# Patient Record
Sex: FEMALE | Race: WHITE | NOT HISPANIC OR LATINO | Employment: FULL TIME | URBAN - METROPOLITAN AREA
[De-identification: names, ages, dates, MRNs, and addresses within clinical notes are randomized per-mention and may not be internally consistent; named-entity substitution may affect disease eponyms.]

---

## 2019-05-14 ENCOUNTER — OCCUPATIONAL HEALTH (OUTPATIENT)
Dept: URGENT CARE | Facility: CLINIC | Age: 59
End: 2019-05-14

## 2019-05-14 DIAGNOSIS — Z02.1 ENCOUNTER FOR PRE-EMPLOYMENT HEALTH SCREENING EXAMINATION: Primary | ICD-10-CM

## 2019-05-14 PROCEDURE — 86580 TB INTRADERMAL TEST: CPT | Mod: S$GLB,,, | Performed by: PHYSICIAN ASSISTANT

## 2019-05-14 PROCEDURE — 86706 HEPATITIS B SURFACE ANTIBODY, QUANTITATIVE: ICD-10-PCS | Mod: S$GLB,,, | Performed by: PHYSICIAN ASSISTANT

## 2019-05-14 PROCEDURE — 80305 DRUG TEST PRSMV DIR OPT OBS: CPT | Mod: S$GLB,,, | Performed by: PHYSICIAN ASSISTANT

## 2019-05-14 PROCEDURE — 86580 POCT TB SKIN TEST: ICD-10-PCS | Mod: S$GLB,,, | Performed by: PHYSICIAN ASSISTANT

## 2019-05-14 PROCEDURE — 80305 OOH NON-DOT DRUG SCREEN: ICD-10-PCS | Mod: S$GLB,,, | Performed by: PHYSICIAN ASSISTANT

## 2019-05-14 PROCEDURE — 86706 HEP B SURFACE ANTIBODY: CPT | Mod: S$GLB,,, | Performed by: PHYSICIAN ASSISTANT

## 2019-05-16 LAB
TB INDURATION - 48 HR READ: 0 MM
TB INDURATION - 72 HR READ: NORMAL MM
TB SKIN TEST - 48 HR READ: NEGATIVE
TB SKIN TEST - 72 HR READ: NORMAL

## 2019-06-04 ENCOUNTER — OCCUPATIONAL HEALTH (OUTPATIENT)
Dept: URGENT CARE | Facility: CLINIC | Age: 59
End: 2019-06-04

## 2019-06-04 DIAGNOSIS — Z02.1 ENCOUNTER FOR PRE-EMPLOYMENT EXAMINATION: Primary | ICD-10-CM

## 2019-06-04 PROCEDURE — 90739 HEPATITIS B (RECOMBINANT) ADJUVANTED, 2 DOSE: ICD-10-PCS | Mod: S$GLB,,, | Performed by: PHYSICIAN ASSISTANT

## 2019-06-04 PROCEDURE — 90739 HEPB VACC 2/4 DOSE ADULT IM: CPT | Mod: S$GLB,,, | Performed by: PHYSICIAN ASSISTANT

## 2019-07-05 ENCOUNTER — OCCUPATIONAL HEALTH (OUTPATIENT)
Dept: URGENT CARE | Facility: CLINIC | Age: 59
End: 2019-07-05

## 2019-07-05 DIAGNOSIS — Z23 NEED FOR PROPHYLACTIC VACCINATION AGAINST HEPATITIS B VIRUS: Primary | ICD-10-CM

## 2019-07-05 PROCEDURE — 90739 HEPB VACC 2/4 DOSE ADULT IM: CPT | Mod: S$GLB,,, | Performed by: NURSE PRACTITIONER

## 2019-07-05 PROCEDURE — 90739 HEPATITIS B (RECOMBINANT) ADJUVANTED, 2 DOSE: ICD-10-PCS | Mod: S$GLB,,, | Performed by: NURSE PRACTITIONER

## 2019-08-06 ENCOUNTER — OCCUPATIONAL HEALTH (OUTPATIENT)
Dept: URGENT CARE | Facility: CLINIC | Age: 59
End: 2019-08-06

## 2019-08-06 DIAGNOSIS — Z01.84 IMMUNITY STATUS TESTING: Primary | ICD-10-CM

## 2019-08-06 PROCEDURE — 86706 HEP B SURFACE ANTIBODY: CPT | Mod: S$GLB,,, | Performed by: PHYSICIAN ASSISTANT

## 2019-08-06 PROCEDURE — 86706 HEPATITIS B SURFACE ANTIBODY, QUANTITATIVE: ICD-10-PCS | Mod: S$GLB,,, | Performed by: PHYSICIAN ASSISTANT

## 2019-08-07 LAB — HBV SURFACE AB SER-ACNC: 714.6 MIU/ML

## 2020-04-24 ENCOUNTER — NURSE TRIAGE (OUTPATIENT)
Dept: ADMINISTRATIVE | Facility: CLINIC | Age: 60
End: 2020-04-24

## 2020-04-24 ENCOUNTER — OFFICE VISIT (OUTPATIENT)
Dept: URGENT CARE | Facility: CLINIC | Age: 60
End: 2020-04-24
Payer: COMMERCIAL

## 2020-04-24 VITALS
BODY MASS INDEX: 24.19 KG/M2 | TEMPERATURE: 98 F | HEIGHT: 59 IN | OXYGEN SATURATION: 97 % | WEIGHT: 120 LBS | HEART RATE: 78 BPM

## 2020-04-24 DIAGNOSIS — R10.9 ABDOMINAL CRAMPS: ICD-10-CM

## 2020-04-24 DIAGNOSIS — Z20.822 SUSPECTED COVID-19 VIRUS INFECTION: Primary | ICD-10-CM

## 2020-04-24 DIAGNOSIS — Z76.89 ENCOUNTER TO ESTABLISH CARE: ICD-10-CM

## 2020-04-24 DIAGNOSIS — R11.0 NAUSEA: ICD-10-CM

## 2020-04-24 PROCEDURE — 99204 PR OFFICE/OUTPT VISIT, NEW, LEVL IV, 45-59 MIN: ICD-10-PCS | Mod: S$GLB,,, | Performed by: NURSE PRACTITIONER

## 2020-04-24 PROCEDURE — 99204 OFFICE O/P NEW MOD 45 MIN: CPT | Mod: S$GLB,,, | Performed by: NURSE PRACTITIONER

## 2020-04-24 PROCEDURE — U0002 COVID-19 LAB TEST NON-CDC: HCPCS

## 2020-04-24 RX ORDER — ONDANSETRON 4 MG/1
4 TABLET, ORALLY DISINTEGRATING ORAL EVERY 6 HOURS PRN
Qty: 12 TABLET | Refills: 0 | Status: SHIPPED | OUTPATIENT
Start: 2020-04-24 | End: 2022-09-21

## 2020-04-24 RX ORDER — DICYCLOMINE HYDROCHLORIDE 20 MG/1
20 TABLET ORAL EVERY 6 HOURS
Qty: 120 TABLET | Refills: 0 | Status: SHIPPED | OUTPATIENT
Start: 2020-04-24 | End: 2020-05-24

## 2020-04-24 NOTE — PROGRESS NOTES
"Subjective:       Patient ID: Jayshree Bee is a 59 y.o. female.    Vitals:  height is 4' 11" (1.499 m) and weight is 54.4 kg (120 lb). Her temperature is 97.9 °F (36.6 °C). Her pulse is 78. Her oxygen saturation is 97%.     Chief Complaint: URI    Patient c/o diarrhea and cough for 10 days. 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 [phone]  Started as cough, uri. Still having ha, cough, gas, abdominal cramping. OTC allergy medications. Has been rehydrating with electrolyte fluids.     URI    This is a new problem. The current episode started 1 to 4 weeks ago (10 days ). The problem has been unchanged. There has been no fever. Associated symptoms include coughing, diarrhea and nausea. Pertinent negatives include no congestion, ear pain, rash, sinus pain, sore throat, vomiting or wheezing. She has tried nothing for the symptoms.       Constitution: Negative for chills, sweating, fatigue and fever.   HENT: Negative for ear pain, congestion, sinus pain, sinus pressure, sore throat and voice change.    Neck: Negative for painful lymph nodes.   Eyes: Negative for eye redness.   Respiratory: Positive for cough. Negative for chest tightness, sputum production, bloody sputum, COPD, shortness of breath, stridor, wheezing and asthma.    Gastrointestinal: Positive for nausea and diarrhea. Negative for vomiting.   Musculoskeletal: Negative for muscle ache.   Skin: Negative for rash.   Allergic/Immunologic: Negative for seasonal allergies and asthma.   Hematologic/Lymphatic: Negative for swollen lymph nodes.       Objective:      Physical Exam   due to the state of emergency this visit has been done remotely as per [facility] protocol.       Assessment:       1. Suspected Covid-19 Virus Infection    2. Nausea    3. Abdominal cramps    4. Encounter to establish care        Plan:       I had a detailed discussion with the patient on quarantining themselves in till they receive results. "  Also discussed symptomatic management.  Also discussed calling anywhere care for further assistance if they start become worse.  Patient voiced understanding and is in agreement with the plan of care.  Suspected Covid-19 Virus Infection  -     COVID-19 Routine Screening    Nausea  -     ondansetron (ZOFRAN-ODT) 4 MG TbDL; Take 1 tablet (4 mg total) by mouth every 6 (six) hours as needed.  Dispense: 12 tablet; Refill: 0    Abdominal cramps  -     dicyclomine (BENTYL) 20 mg tablet; Take 1 tablet (20 mg total) by mouth every 6 (six) hours.  Dispense: 120 tablet; Refill: 0    Encounter to establish care  -     Ambulatory referral/consult to Internal Medicine         Patient Instructions   Instructions for Patients with Confirmed or Suspected COVID-19    If you are awaiting your test result, you will either be called or it will be released to the patient portal.  If you have any questions about your test, please visit [email or web address] or call our COVID-19 information line at [phone].       Stay home and stay away from family members and friends. The CDC says, you can leave home after these three things have happened: 1) You have had no fever for at least 72 hours (that is three full days of no fever without the use of medicine that reduces fevers) 2) AND other symptoms have improved (for example, when your cough or shortness of breath have improved) 3) AND at least 7 days have passed since your symptoms first appeared.   Separate yourself from other people and animals in your home.   Call ahead before visiting your doctor.   Wear a facemask.   Cover your coughs and sneezes.   Wash your hands often with soap and water; hand  can be used, too.   Avoid sharing personal household items.   Wipe down surfaces used daily.   Monitor your symptoms. Seek prompt medical attention if your illness is worsening (e.g., difficulty breathing).    Before seeking care, call your healthcare  provider.   If you have a medical emergency and need to call 911, notify the dispatch personnel that you have, or are being evaluated for COVID-19. If possible, put on a facemask before emergency medical services arrive.        Recommended precautions for household members, intimate partners, and caregivers in a home setting of a patient with symptomatic laboratory-confirmed COVID-19 or a patient under investigation.  Household members, intimate partners, and caregivers in the home setting awaiting tests results have close contact with a person with symptomatic, laboratory-confirmed COVID-19 or a person under investigation. Close contacts should monitor their health; they should call their provider right away if they develop symptoms suggestive of COVID-19 (e.g., fever, cough, shortness of breath).    Close contacts should also follow these recommendations:   Make sure that you understand and can help the patient follow their provider's instructions for medication(s) and care. You should help the patient with basic needs in the home and provide support for getting groceries, prescriptions, and other personal needs.   Monitor the patient's symptoms. If the patient is getting sicker, call his or her healthcare provider and tell them that the patient has laboratory-confirmed COVID-19. If the patient has a medical emergency and you need to call 911, notify the dispatch personnel that the patient has, or is being evaluated for COVID-19.   Household members should stay in another room or be  from the patient. Household members should use a separate bedroom and bathroom, if available.   Prohibit visitors.   Household members should care for any pets in the home.   Make sure that shared spaces in the home have good air flow, such as by an air conditioner or an opened window, weather permitting.   Perform hand hygiene frequently. Wash your hands often with soap and water for at least 20 seconds or use an  alcohol-based hand  (that contains > 60% alcohol) covering all surfaces of your hands and rubbing them together until they feel dry. Soap and water should be used preferentially.   Avoid touching your eyes, nose, and mouth.   The patient should wear a facemask. If the patient is not able to wear a facemask (for example, because it causes trouble breathing), caregivers should wear a mask when they are in the same room as the patient.   Wear a disposable facemask and gloves when you touch or have contact with the patient's blood, stool, or body fluids, such as saliva, sputum, nasal mucus, vomit, urine.  o Throw out disposable facemasks and gloves after using them. Do not reuse.  o When removing personal protective equipment, first remove and dispose of gloves. Then, immediately clean your hands with soap and water or alcohol-based hand . Next, remove and dispose of facemask, and immediately clean your hands again with soap and water or alcohol-based hand .   You should not share dishes, drinking glasses, cups, eating utensils, towels, bedding, or other items with the patient. After the patient uses these items, you should wash them thoroughly (see below Wash laundry thoroughly).   Clean all high-touch surfaces, such as counters, tabletops, doorknobs, bathroom fixtures, toilets, phones, keyboards, tablets, and bedside tables, every day. Also, clean any surfaces that may have blood, stool, or body fluids on them.   Use a household cleaning spray or wipe, according to the label instructions. Labels contain instructions for safe and effective use of the cleaning product including precautions you should take when applying the product, such as wearing gloves and making sure you have good ventilation during use of the product.   Wash laundry thoroughly.  o Immediately remove and wash clothes or bedding that have blood, stool, or body fluids on them.  o Wear disposable gloves while  handling soiled items and keep soiled items away from your body. Clean your hands (with soap and water or an alcohol-based hand ) immediately after removing your gloves.  o Read and follow directions on labels of laundry or clothing items and detergent. In general, using a normal laundry detergent according to washing machine instructions and dry thoroughly using the warmest temperatures recommended on the clothing label.   Place all used disposable gloves, facemasks, and other contaminated items in a lined container before disposing of them with other household waste. Clean your hands (with soap and water or an alcohol-based hand ) immediately after handling these items. Soap and water should be used preferentially if hands are visibly dirty.   Discuss any additional questions with your state or local health department or healthcare provider. Check available hours when contacting your local health department.    For more information see CDC link below.      https://www.cdc.gov/coronavirus/2019-ncov/hcp/guidance-prevent-spread.html#precautions        Sources:  [facility]– [place], [place] [facility]    Treating Diarrhea    Diarrhea happens when you have loose, watery, or frequent bowel movements. It is a common problem with many causes. Most cases of diarrhea clear up on their own. But certain cases may need treatment. Be sure to see your healthcare provider if your symptoms do not improve within a few days.  Getting relief  Treatment of diarrhea depends on its cause. Diarrhea caused by bacterial or parasite infection is often treated with antibiotics. Diarrhea caused by other factors, such as a stomach virus, often improves with simple home treatment. The tips below may also help relieve your symptoms.  · Drink plenty of fluids. This helps prevent too much fluid loss (dehydration). Water, clear soups, and electrolyte solutions are good choices. Avoid alcohol, coffee, tea, and milk.  These can irritate your intestines and make symptoms worse.  · Suck on ice chips if drinking makes you queasy.  · Return to your normal diet slowly. You may want to eat bland foods at first, such as rice and toast. Also, you may need to avoid certain foods for a while, such as dairy products. These can make symptoms worse. Ask your healthcare provider if there are any other foods you should avoid.  · If you were prescribed antibiotics, take them as directed.  · Do not take anti-diarrhea medicines without asking your healthcare provider first.  Call your healthcare provider   Call your healthcare provider if you have any of the following:   · A fever of 100.4°F (38.0°C) or higher, or as directed by your healthcare provider  · Severe pain  · Worsening diarrhea or diarrhea for more than 2 days  · Bloody vomit or stool  · Signs of dehydration (dizziness, dry mouth and tongue, rapid pulse, dark urine)  Date Last Reviewed: 7/1/2016  © 8166-3861 [employer]. [address] [place] [address], [place] [address]. All rights reserved. This information is not intended as a substitute for professional medical care. Always follow your healthcare professional's instructions.

## 2020-04-24 NOTE — PATIENT INSTRUCTIONS
Instructions for Patients with Confirmed or Suspected COVID-19    If you are awaiting your test result, you will either be called or it will be released to the patient portal.  If you have any questions about your test, please visit [email or web address] or call our COVID-19 information line at [phone].       Stay home and stay away from family members and friends. The CDC says, you can leave home after these three things have happened: 1) You have had no fever for at least 72 hours (that is three full days of no fever without the use of medicine that reduces fevers) 2) AND other symptoms have improved (for example, when your cough or shortness of breath have improved) 3) AND at least 7 days have passed since your symptoms first appeared.   Separate yourself from other people and animals in your home.   Call ahead before visiting your doctor.   Wear a facemask.   Cover your coughs and sneezes.   Wash your hands often with soap and water; hand  can be used, too.   Avoid sharing personal household items.   Wipe down surfaces used daily.   Monitor your symptoms. Seek prompt medical attention if your illness is worsening (e.g., difficulty breathing).    Before seeking care, call your healthcare provider.   If you have a medical emergency and need to call 911, notify the dispatch personnel that you have, or are being evaluated for COVID-19. If possible, put on a facemask before emergency medical services arrive.        Recommended precautions for household members, intimate partners, and caregivers in a home setting of a patient with symptomatic laboratory-confirmed COVID-19 or a patient under investigation.  Household members, intimate partners, and caregivers in the home setting awaiting tests results have close contact with a person with symptomatic, laboratory-confirmed COVID-19 or a person under investigation. Close contacts should monitor their health; they should call their  provider right away if they develop symptoms suggestive of COVID-19 (e.g., fever, cough, shortness of breath).    Close contacts should also follow these recommendations:   Make sure that you understand and can help the patient follow their provider's instructions for medication(s) and care. You should help the patient with basic needs in the home and provide support for getting groceries, prescriptions, and other personal needs.   Monitor the patient's symptoms. If the patient is getting sicker, call his or her healthcare provider and tell them that the patient has laboratory-confirmed COVID-19. If the patient has a medical emergency and you need to call 911, notify the dispatch personnel that the patient has, or is being evaluated for COVID-19.   Household members should stay in another room or be  from the patient. Household members should use a separate bedroom and bathroom, if available.   Prohibit visitors.   Household members should care for any pets in the home.   Make sure that shared spaces in the home have good air flow, such as by an air conditioner or an opened window, weather permitting.   Perform hand hygiene frequently. Wash your hands often with soap and water for at least 20 seconds or use an alcohol-based hand  (that contains > 60% alcohol) covering all surfaces of your hands and rubbing them together until they feel dry. Soap and water should be used preferentially.   Avoid touching your eyes, nose, and mouth.   The patient should wear a facemask. If the patient is not able to wear a facemask (for example, because it causes trouble breathing), caregivers should wear a mask when they are in the same room as the patient.   Wear a disposable facemask and gloves when you touch or have contact with the patient's blood, stool, or body fluids, such as saliva, sputum, nasal mucus, vomit, urine.  o Throw out disposable facemasks and gloves after using them. Do not  reuse.  o When removing personal protective equipment, first remove and dispose of gloves. Then, immediately clean your hands with soap and water or alcohol-based hand . Next, remove and dispose of facemask, and immediately clean your hands again with soap and water or alcohol-based hand .   You should not share dishes, drinking glasses, cups, eating utensils, towels, bedding, or other items with the patient. After the patient uses these items, you should wash them thoroughly (see below Wash laundry thoroughly).   Clean all high-touch surfaces, such as counters, tabletops, doorknobs, bathroom fixtures, toilets, phones, keyboards, tablets, and bedside tables, every day. Also, clean any surfaces that may have blood, stool, or body fluids on them.   Use a household cleaning spray or wipe, according to the label instructions. Labels contain instructions for safe and effective use of the cleaning product including precautions you should take when applying the product, such as wearing gloves and making sure you have good ventilation during use of the product.   Wash laundry thoroughly.  o Immediately remove and wash clothes or bedding that have blood, stool, or body fluids on them.  o Wear disposable gloves while handling soiled items and keep soiled items away from your body. Clean your hands (with soap and water or an alcohol-based hand ) immediately after removing your gloves.  o Read and follow directions on labels of laundry or clothing items and detergent. In general, using a normal laundry detergent according to washing machine instructions and dry thoroughly using the warmest temperatures recommended on the clothing label.   Place all used disposable gloves, facemasks, and other contaminated items in a lined container before disposing of them with other household waste. Clean your hands (with soap and water or an alcohol-based hand ) immediately after handling these  items. Soap and water should be used preferentially if hands are visibly dirty.   Discuss any additional questions with your state or local health department or healthcare provider. Check available hours when contacting your local health department.    For more information see CDC link below.      https://www.cdc.gov/coronavirus/2019-ncov/hcp/guidance-prevent-spread.html#precautions        Sources:  [facility], [place] [facility]    Treating Diarrhea    Diarrhea happens when you have loose, watery, or frequent bowel movements. It is a common problem with many causes. Most cases of diarrhea clear up on their own. But certain cases may need treatment. Be sure to see your healthcare provider if your symptoms do not improve within a few days.  Getting relief  Treatment of diarrhea depends on its cause. Diarrhea caused by bacterial or parasite infection is often treated with antibiotics. Diarrhea caused by other factors, such as a stomach virus, often improves with simple home treatment. The tips below may also help relieve your symptoms.  · Drink plenty of fluids. This helps prevent too much fluid loss (dehydration). Water, clear soups, and electrolyte solutions are good choices. Avoid alcohol, coffee, tea, and milk. These can irritate your intestines and make symptoms worse.  · Suck on ice chips if drinking makes you queasy.  · Return to your normal diet slowly. You may want to eat bland foods at first, such as rice and toast. Also, you may need to avoid certain foods for a while, such as dairy products. These can make symptoms worse. Ask your healthcare provider if there are any other foods you should avoid.  · If you were prescribed antibiotics, take them as directed.  · Do not take anti-diarrhea medicines without asking your healthcare provider first.  Call your healthcare provider   Call your healthcare provider if you have any of the following:   · A fever of 100.4°F (38.0°C) or higher, or as  directed by your healthcare provider  · Severe pain  · Worsening diarrhea or diarrhea for more than 2 days  · Bloody vomit or stool  · Signs of dehydration (dizziness, dry mouth and tongue, rapid pulse, dark urine)  Date Last Reviewed: 7/1/2016 © 2000-2017 [employer]. [address] [place] [address]. All rights reserved. This information is not intended as a substitute for professional medical care. Always follow your healthcare professional's instructions.

## 2020-04-25 LAB — SARS-COV-2 RNA RESP QL NAA+PROBE: NOT DETECTED

## 2020-04-26 ENCOUNTER — TELEPHONE (OUTPATIENT)
Dept: URGENT CARE | Facility: CLINIC | Age: 60
End: 2020-04-26

## 2021-04-15 ENCOUNTER — PATIENT MESSAGE (OUTPATIENT)
Dept: RESEARCH | Facility: HOSPITAL | Age: 61
End: 2021-04-15

## 2022-07-12 ENCOUNTER — PATIENT MESSAGE (OUTPATIENT)
Dept: INTERNAL MEDICINE | Facility: CLINIC | Age: 62
End: 2022-07-12
Payer: COMMERCIAL

## 2022-08-26 LAB
CHOLEST SERPL-MSCNC: 193 MG/DL (ref 0–200)
HBA1C MFR BLD: 5.2 % (ref 4–6)
HDLC SERPL-MCNC: 69 MG/DL (ref 35–70)
LDLC SERPL CALC-MCNC: 116 MG/DL (ref 0–160)
TRIGL SERPL-MCNC: 100 MG/DL (ref 40–160)

## 2022-09-21 ENCOUNTER — TELEPHONE (OUTPATIENT)
Dept: INTERNAL MEDICINE | Facility: CLINIC | Age: 62
End: 2022-09-21

## 2022-09-21 ENCOUNTER — IMMUNIZATION (OUTPATIENT)
Dept: INTERNAL MEDICINE | Facility: CLINIC | Age: 62
End: 2022-09-21
Payer: COMMERCIAL

## 2022-09-21 ENCOUNTER — OFFICE VISIT (OUTPATIENT)
Dept: INTERNAL MEDICINE | Facility: CLINIC | Age: 62
End: 2022-09-21
Payer: COMMERCIAL

## 2022-09-21 VITALS
OXYGEN SATURATION: 98 % | HEART RATE: 70 BPM | BODY MASS INDEX: 23.83 KG/M2 | SYSTOLIC BLOOD PRESSURE: 124 MMHG | HEIGHT: 59 IN | WEIGHT: 118.19 LBS | DIASTOLIC BLOOD PRESSURE: 78 MMHG

## 2022-09-21 DIAGNOSIS — K21.9 GASTROESOPHAGEAL REFLUX DISEASE, UNSPECIFIED WHETHER ESOPHAGITIS PRESENT: Primary | ICD-10-CM

## 2022-09-21 DIAGNOSIS — M85.80 OSTEOPENIA WITH HIGH RISK OF FRACTURE: ICD-10-CM

## 2022-09-21 DIAGNOSIS — Z23 NEED FOR INFLUENZA VACCINATION: ICD-10-CM

## 2022-09-21 PROCEDURE — 90686 FLU VACCINE (QUAD) GREATER THAN OR EQUAL TO 3YO PRESERVATIVE FREE IM: ICD-10-PCS | Mod: S$GLB,,, | Performed by: PHYSICIAN ASSISTANT

## 2022-09-21 PROCEDURE — 90471 FLU VACCINE (QUAD) GREATER THAN OR EQUAL TO 3YO PRESERVATIVE FREE IM: ICD-10-PCS | Mod: S$GLB,,, | Performed by: PHYSICIAN ASSISTANT

## 2022-09-21 PROCEDURE — 90471 IMMUNIZATION ADMIN: CPT | Mod: S$GLB,,, | Performed by: PHYSICIAN ASSISTANT

## 2022-09-21 PROCEDURE — 3078F DIAST BP <80 MM HG: CPT | Mod: CPTII,S$GLB,, | Performed by: PHYSICIAN ASSISTANT

## 2022-09-21 PROCEDURE — 1160F RVW MEDS BY RX/DR IN RCRD: CPT | Mod: CPTII,S$GLB,, | Performed by: PHYSICIAN ASSISTANT

## 2022-09-21 PROCEDURE — 3078F PR MOST RECENT DIASTOLIC BLOOD PRESSURE < 80 MM HG: ICD-10-PCS | Mod: CPTII,S$GLB,, | Performed by: PHYSICIAN ASSISTANT

## 2022-09-21 PROCEDURE — 3074F PR MOST RECENT SYSTOLIC BLOOD PRESSURE < 130 MM HG: ICD-10-PCS | Mod: CPTII,S$GLB,, | Performed by: PHYSICIAN ASSISTANT

## 2022-09-21 PROCEDURE — 3074F SYST BP LT 130 MM HG: CPT | Mod: CPTII,S$GLB,, | Performed by: PHYSICIAN ASSISTANT

## 2022-09-21 PROCEDURE — 99204 PR OFFICE/OUTPT VISIT, NEW, LEVL IV, 45-59 MIN: ICD-10-PCS | Mod: 25,S$GLB,, | Performed by: PHYSICIAN ASSISTANT

## 2022-09-21 PROCEDURE — 99999 PR PBB SHADOW E&M-EST. PATIENT-LVL IV: ICD-10-PCS | Mod: PBBFAC,,, | Performed by: PHYSICIAN ASSISTANT

## 2022-09-21 PROCEDURE — 90686 IIV4 VACC NO PRSV 0.5 ML IM: CPT | Mod: S$GLB,,, | Performed by: PHYSICIAN ASSISTANT

## 2022-09-21 PROCEDURE — 1160F PR REVIEW ALL MEDS BY PRESCRIBER/CLIN PHARMACIST DOCUMENTED: ICD-10-PCS | Mod: CPTII,S$GLB,, | Performed by: PHYSICIAN ASSISTANT

## 2022-09-21 PROCEDURE — 1159F MED LIST DOCD IN RCRD: CPT | Mod: CPTII,S$GLB,, | Performed by: PHYSICIAN ASSISTANT

## 2022-09-21 PROCEDURE — 1159F PR MEDICATION LIST DOCUMENTED IN MEDICAL RECORD: ICD-10-PCS | Mod: CPTII,S$GLB,, | Performed by: PHYSICIAN ASSISTANT

## 2022-09-21 PROCEDURE — 3008F BODY MASS INDEX DOCD: CPT | Mod: CPTII,S$GLB,, | Performed by: PHYSICIAN ASSISTANT

## 2022-09-21 PROCEDURE — 99204 OFFICE O/P NEW MOD 45 MIN: CPT | Mod: 25,S$GLB,, | Performed by: PHYSICIAN ASSISTANT

## 2022-09-21 PROCEDURE — 99999 PR PBB SHADOW E&M-EST. PATIENT-LVL IV: CPT | Mod: PBBFAC,,, | Performed by: PHYSICIAN ASSISTANT

## 2022-09-21 PROCEDURE — 3008F PR BODY MASS INDEX (BMI) DOCUMENTED: ICD-10-PCS | Mod: CPTII,S$GLB,, | Performed by: PHYSICIAN ASSISTANT

## 2022-09-21 RX ORDER — DICLOFENAC SODIUM 20 MG/G
SOLUTION TOPICAL
COMMUNITY
Start: 2022-08-05

## 2022-09-21 RX ORDER — ESTRADIOL 2 MG/1
SYSTEM VAGINAL
COMMUNITY
Start: 2022-08-31 | End: 2024-03-13

## 2022-09-21 RX ORDER — PANTOPRAZOLE SODIUM 40 MG/1
40 TABLET, DELAYED RELEASE ORAL EVERY MORNING
COMMUNITY
Start: 2022-07-25

## 2022-09-21 NOTE — TELEPHONE ENCOUNTER
Spoke with patient and gave her the information she needed. Scheduled patient for a endo appointment 09/28/2022 at 11:00 am.

## 2022-09-21 NOTE — PROGRESS NOTES
Subjective:       Patient ID: Jayshree Bee is a 61 y.o. female.    Chief Complaint: Establish Care      HPI          Established pt of Primary Doctor No (new to me)    Pt has appt with Dr. Cifuentes in March 2023 to establish care.     Never had a PCP, seeing her GYN Dr. Mora for primary care.     GYN advised she establish PCP as she had lab concern for prediabetes and worsening osteoporosis.     recent lab work(I reviewed, multiple lab test) August 2022 by her Cardiologist, Dr. Angel Glover with improvement of A1c now at 5.2    Osteoporosis: Need Dexa reports. Prev on Actonel.     GERD: On Protonix 40mg for the past 5 years. Sees Dr. Robbin Rao, [facility]. Hx of several EGD, hx of esophageal dilation. Reports Colonoscopy is UTD    FH of early heart disease: Father open heart sx at age 47, passed away at 56 from massive MI. Pt without any hx of CAD, reports having Echo, Carotid US, and Stress test with her cardiology.     OA: Sees outside Ortho, recent trigger finger injection, right had. Hx of hip injection, Using Pennsaid for right leg pain.     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    GERD (gastroesophageal reflux disease)     Osteoporosis        Social History     Tobacco Use    Smoking status: Never   Substance Use Topics    Alcohol use: Not Currently    Drug use: Never       Review of patient's allergies indicates:  No Known Allergies      Current Outpatient Medications:     ESTRING 2 mg (7.5 mcg /24 hour) vaginal ring, SMARTSIG:Ring Vaginal Every 3 Months, Disp: , Rfl:     pantoprazole (PROTONIX) 40 MG tablet, Take 40 mg by mouth every morning., Disp: , Rfl:     PENNSAID 20 mg/gram /actuation(2 %) sopm, Apply topically., Disp: , Rfl:     Family History   Problem Relation Age of Onset    Arthritis Mother         Osteoarthritis    Diabetes Mother     Hypertension Mother     Early death Father         Heart Attack    Hearing loss  "Father     Heart disease Father         Triple bypass at 46/ Heart Attack death at 56    Hyperlipidemia Father     Hypertension Father     Cancer Maternal Grandfather         Stomach Cancer    Arthritis Maternal Grandmother         Crippling Arthritis  (RA)?    Hearing loss Paternal Grandfather     Cancer Paternal Aunt         Ovarian Ca    Hyperlipidemia Paternal Aunt     Cancer Paternal Aunt         Leiomyosarcoma    Cancer Paternal Uncle         Lung Ca    Cancer Paternal Uncle         Lung Ca    Heart disease Paternal Uncle         Early Death       Past Surgical History:   Procedure Laterality Date    AUGMENTATION OF BREAST Bilateral 2002    AUGMENTATION OF BREAST Bilateral 2014    BREAST BIOPSY Right 2004    BREAST LUMPECTOMY Right 2004    BREAST SURGERY Bilateral 2002    HYSTERECTOMY  2011    complete    OOPHORECTOMY  2011    w/ hysterectomy       Review of Systems   Constitutional:  Negative for chills, fever and unexpected weight change.   Respiratory:  Negative for cough and shortness of breath.    Cardiovascular:  Negative for chest pain and leg swelling.   Gastrointestinal:  Negative for abdominal pain, nausea and vomiting.   Musculoskeletal:  Positive for arthralgias.   Integumentary:  Negative for rash.   Neurological:  Negative for weakness and headaches.     Objective: /78 (BP Location: Left arm, Patient Position: Sitting, BP Method: Medium (Manual))   Pulse 70   Ht 4' 11" (1.499 m)   Wt 53.6 kg (118 lb 2.7 oz)   SpO2 98%   BMI 23.87 kg/m²         Physical Exam  Vitals reviewed.   Constitutional:       General: She is not in acute distress.     Appearance: She is well-developed.   HENT:      Head: Normocephalic and atraumatic.   Cardiovascular:      Rate and Rhythm: Normal rate and regular rhythm.      Heart sounds: No murmur heard.  Pulmonary:      Effort: Pulmonary effort is normal.      Breath sounds: Normal breath sounds. No wheezing or rales.   Abdominal:      General: Bowel sounds " are normal.      Palpations: Abdomen is soft.      Tenderness: There is no abdominal tenderness.   Skin:     General: Skin is warm and dry.      Findings: No rash.   Neurological:      Mental Status: She is alert.       Assessment:       1. Gastroesophageal reflux disease, unspecified whether esophagitis present    2. Osteoporosis, unspecified osteoporosis type, unspecified pathological fracture presence    3. Need for hepatitis C screening test        Plan:             Jayshree was seen today for establish care.    Diagnoses and all orders for this visit:    Gastroesophageal reflux disease, unspecified whether esophagitis present  On PPI per outside GI    Osteopenia with high risk of fracture  -     Ambulatory referral/consult to Endocrinology; Future    Need for influenza vaccination  Today in clinic    Obtain outside records  Keep appt with Dr. Cifuentes to fully establish care.       SHAHBAZ [facility]

## 2022-09-28 ENCOUNTER — OFFICE VISIT (OUTPATIENT)
Dept: ENDOCRINOLOGY | Facility: CLINIC | Age: 62
End: 2022-09-28
Payer: COMMERCIAL

## 2022-09-28 VITALS
HEART RATE: 79 BPM | SYSTOLIC BLOOD PRESSURE: 136 MMHG | DIASTOLIC BLOOD PRESSURE: 73 MMHG | HEIGHT: 59 IN | WEIGHT: 116 LBS | BODY MASS INDEX: 23.39 KG/M2

## 2022-09-28 DIAGNOSIS — M81.0 AGE-RELATED OSTEOPOROSIS WITHOUT CURRENT PATHOLOGICAL FRACTURE: Primary | ICD-10-CM

## 2022-09-28 DIAGNOSIS — M85.80 OSTEOPENIA WITH HIGH RISK OF FRACTURE: ICD-10-CM

## 2022-09-28 PROCEDURE — 3008F BODY MASS INDEX DOCD: CPT | Mod: CPTII,S$GLB,, | Performed by: INTERNAL MEDICINE

## 2022-09-28 PROCEDURE — 1160F RVW MEDS BY RX/DR IN RCRD: CPT | Mod: CPTII,S$GLB,, | Performed by: INTERNAL MEDICINE

## 2022-09-28 PROCEDURE — 3078F PR MOST RECENT DIASTOLIC BLOOD PRESSURE < 80 MM HG: ICD-10-PCS | Mod: CPTII,S$GLB,, | Performed by: INTERNAL MEDICINE

## 2022-09-28 PROCEDURE — 1159F PR MEDICATION LIST DOCUMENTED IN MEDICAL RECORD: ICD-10-PCS | Mod: CPTII,S$GLB,, | Performed by: INTERNAL MEDICINE

## 2022-09-28 PROCEDURE — 3078F DIAST BP <80 MM HG: CPT | Mod: CPTII,S$GLB,, | Performed by: INTERNAL MEDICINE

## 2022-09-28 PROCEDURE — 99204 OFFICE O/P NEW MOD 45 MIN: CPT | Mod: S$GLB,,, | Performed by: INTERNAL MEDICINE

## 2022-09-28 PROCEDURE — 3008F PR BODY MASS INDEX (BMI) DOCUMENTED: ICD-10-PCS | Mod: CPTII,S$GLB,, | Performed by: INTERNAL MEDICINE

## 2022-09-28 PROCEDURE — 3075F SYST BP GE 130 - 139MM HG: CPT | Mod: CPTII,S$GLB,, | Performed by: INTERNAL MEDICINE

## 2022-09-28 PROCEDURE — 1159F MED LIST DOCD IN RCRD: CPT | Mod: CPTII,S$GLB,, | Performed by: INTERNAL MEDICINE

## 2022-09-28 PROCEDURE — 99204 PR OFFICE/OUTPT VISIT, NEW, LEVL IV, 45-59 MIN: ICD-10-PCS | Mod: S$GLB,,, | Performed by: INTERNAL MEDICINE

## 2022-09-28 PROCEDURE — 1160F PR REVIEW ALL MEDS BY PRESCRIBER/CLIN PHARMACIST DOCUMENTED: ICD-10-PCS | Mod: CPTII,S$GLB,, | Performed by: INTERNAL MEDICINE

## 2022-09-28 PROCEDURE — 3075F PR MOST RECENT SYSTOLIC BLOOD PRESS GE 130-139MM HG: ICD-10-PCS | Mod: CPTII,S$GLB,, | Performed by: INTERNAL MEDICINE

## 2022-09-28 RX ORDER — HEPARIN 100 UNIT/ML
500 SYRINGE INTRAVENOUS
Status: CANCELLED | OUTPATIENT
Start: 2022-10-10

## 2022-09-28 RX ORDER — SODIUM CHLORIDE 0.9 % (FLUSH) 0.9 %
10 SYRINGE (ML) INJECTION
Status: CANCELLED | OUTPATIENT
Start: 2022-10-10

## 2022-09-28 RX ORDER — ZOLEDRONIC ACID 5 MG/100ML
5 INJECTION, SOLUTION INTRAVENOUS
Status: CANCELLED | OUTPATIENT
Start: 2022-10-10

## 2022-09-28 RX ORDER — ACETAMINOPHEN 500 MG
500 TABLET ORAL
Status: CANCELLED | OUTPATIENT
Start: 2022-10-10

## 2022-09-28 NOTE — ASSESSMENT & PLAN NOTE
Discussed with pt diagnosis of osteoporosis.    seen since at least 2019 DXA, seen again on 2022 DXA. Not on medication.    Reviewed basics of bone quantity versus quality  Reassuring she is not fracturing   Already had labs with normal TSH, calcium, vitamin D.    Risks include  race, early menopause, +FH, PPI therapy    Recommend pt continue calcium and vitamin D intake  Fall precautions emphasized  Encourage weight bearing exercises    Treatment options and potential side effects discussed for PO bisphosphonates, reclast, Denosumab, and Teriparatide.    Low risk for ONJ and atypical fractures reviewed and discussed. Alerted that if dental work needs to be done it should be done prior to initiating therapy   Discussed optimal duration of bisphosphonate use is unknown - drug holiday after 5-10 po versus drug holiday after 3 years of reclast treatment     With hx GERD, side effects with actonel in the past, hx esophageal dilation, recommend reclast IV once a year.   pt open to that.   Orders in the therapy plan for Reclast IV 5 mg once a year.  Encourage pt to send lab results as an attachment vs see if PCP office is able to scan them into media if they got her lab results recently, since infusion center normally needs labs before giving reclast.

## 2022-09-28 NOTE — PROGRESS NOTES
Subjective:      Chief Complaint: Osteoporosis    HPI: Jayshree Bee is a 61 y.o. female who is here for an initial evaluation for bones.    With regards to osteoporosis:   Pt diagnosed with osteoporosis on bone density scan from: 12/31/2019  Fractures: wrist. Fell when skating, hit a rock.  First bone density was: 11/2016 (osteopenia, -2.3 spine)  Last bone density: 1/17/2022. FRAX 19% major, 3.4% hip  Location BMD T-score Change?   Spine 0.82 -3 -4%   Total hip (right) 0.813 -1.5    Fem neck 0.829 -1.4 N/A     Osteoporosis Risk Factor Assessment:  Menopause occurred at 2004 (breast cancer, tamoxifen so periods stopped, hysterectomy)    No   Yes  []    [x]  Her menstrual cycles were normal throughout her reproductive life.   [x]    []  Prior use of hormone replacement therapy  []    [x]  Falls over the age of fifty  []    [x]  fractures to her wrist, hip or spine  [x]    []  loss of height of more than 1 1/2 - 2 inches   []    [x]  family history of osteoporosis, stooped posture, or fractures of hip.  [x]    []  Kidney stones, kidney disease  [x]    []  Thyroid disease  []    [x]  Medication exposure: steroids, anticoagulants, proton pump inhibitors or anti-seizure medications.  [x]    []  Tobacco use, including use in the past.  [x]    []  EtOH use  []    [x]  Active malignancy, history of bone malignancy, or prior radiation treatment (for breast cancer)    Last labs:    No results found for: CALCIUM, ALBUMIN, CREATININE, [id number], PTH    Pt has labs on phone from 9/2022:   vitamin D was normal. 43.   TSH normal   Cr 0.6,    Ca normal   Iron, b12 normal   A1C 5.2    Treatment:  Osteoporosis medications used in the past:     Actonel. Few years ago. Had GI side effects    Current medication: None    Vitamin intake:  Calcium: OTC  Vit D: 2000-5,000 units daily    Weight bearing exercise? Not a lot. Sometimes walking     Today, pt reports feeling okay overall.    Symptoms  No   Yes  [x]    []  Falls or  fractures recently  [x]    []  Dizziness, lightheadedness    Does have hx reflux. On medications, had dilation in the past too  No dental implants/extractions pending. Sees dentist twice yearly.    Reviewed past medical, family, social history and updated as appropriate.    Review of Systems  As above    Objective:     Vitals:    09/28/22 1057   BP: 136/73   Pulse: 79     BP Readings from Last 5 Encounters:   09/28/22 136/73   09/21/22 124/78     Physical Exam  Vitals reviewed.   Constitutional:       General: She is not in acute distress.  Neck:      Thyroid: No thyromegaly.   Cardiovascular:      Heart sounds: Normal heart sounds.   Pulmonary:      Effort: Pulmonary effort is normal.       Wt Readings from Last 5 Encounters:   09/28/22 1057 52.6 kg (116 lb)   09/21/22 0812 53.6 kg (118 lb 2.7 oz)   11/03/21 1357 54.4 kg (120 lb)   04/24/20 1347 54.4 kg (120 lb)   12/03/18 1209 54.4 kg (120 lb)     No results found for: HGBA1C  No results found for: CHOL, HDL, LDLCALC, TRIG, CHOLHDL  No results found for: NA, K, CL, CO2, GLU, BUN, CREATININE, CALCIUM, PROT, ALBUMIN, BILITOT, ALKPHOS, AST, ALT, ANIONGAP, ESTGFRAFRICA, EGFRNONAA, TSH   No results found for: MICALBCREAT    Assessment/Plan:     Age-related osteoporosis without current pathological fracture  Discussed with pt diagnosis of osteoporosis.    seen since at least 2019 DXA, seen again on 2022 DXA. Not on medication.    Reviewed basics of bone quantity versus quality  Reassuring she is not fracturing   Already had labs with normal TSH, calcium, vitamin D.    Risks include  race, early menopause, +FH, PPI therapy    Recommend pt continue calcium and vitamin D intake  Fall precautions emphasized  Encourage weight bearing exercises    Treatment options and potential side effects discussed for PO bisphosphonates, reclast, Denosumab, and Teriparatide.    Low risk for ONJ and atypical fractures reviewed and discussed. Alerted that if dental work needs to be  done it should be done prior to initiating therapy   Discussed optimal duration of bisphosphonate use is unknown - drug holiday after 5-10 po versus drug holiday after 3 years of reclast treatment     With hx GERD, side effects with actonel in the past, hx esophageal dilation, recommend reclast IV once a year.   pt open to that.   Orders in the therapy plan for Reclast IV 5 mg once a year.  Encourage pt to send lab results as an attachment vs see if PCP office is able to scan them into media if they got her lab results recently, since infusion center normally needs labs before giving reclast.      Follow up in about 1 year (around 9/28/2023) for lab review, further monitoring.      Armaan Leonard MD  Endocrinology

## 2022-12-05 ENCOUNTER — PATIENT MESSAGE (OUTPATIENT)
Dept: ENDOCRINOLOGY | Facility: CLINIC | Age: 62
End: 2022-12-05
Payer: COMMERCIAL

## 2022-12-13 ENCOUNTER — PATIENT MESSAGE (OUTPATIENT)
Dept: INTERNAL MEDICINE | Facility: CLINIC | Age: 62
End: 2022-12-13
Payer: COMMERCIAL

## 2022-12-19 ENCOUNTER — INFUSION (OUTPATIENT)
Dept: INFUSION THERAPY | Facility: OTHER | Age: 62
End: 2022-12-19
Payer: COMMERCIAL

## 2022-12-19 VITALS
SYSTOLIC BLOOD PRESSURE: 132 MMHG | RESPIRATION RATE: 16 BRPM | WEIGHT: 124.75 LBS | DIASTOLIC BLOOD PRESSURE: 70 MMHG | HEART RATE: 79 BPM | BODY MASS INDEX: 25.2 KG/M2 | TEMPERATURE: 99 F | OXYGEN SATURATION: 98 %

## 2022-12-19 DIAGNOSIS — M81.0 AGE-RELATED OSTEOPOROSIS WITHOUT CURRENT PATHOLOGICAL FRACTURE: Primary | ICD-10-CM

## 2022-12-19 PROCEDURE — 96365 THER/PROPH/DIAG IV INF INIT: CPT

## 2022-12-19 PROCEDURE — 25000003 PHARM REV CODE 250: Performed by: INTERNAL MEDICINE

## 2022-12-19 PROCEDURE — 63600175 PHARM REV CODE 636 W HCPCS: Performed by: INTERNAL MEDICINE

## 2022-12-19 PROCEDURE — 96372 THER/PROPH/DIAG INJ SC/IM: CPT | Mod: 59

## 2022-12-19 RX ORDER — SODIUM CHLORIDE 0.9 % (FLUSH) 0.9 %
10 SYRINGE (ML) INJECTION
Status: DISCONTINUED | OUTPATIENT
Start: 2022-12-19 | End: 2022-12-19 | Stop reason: HOSPADM

## 2022-12-19 RX ORDER — ZOLEDRONIC ACID 5 MG/100ML
5 INJECTION, SOLUTION INTRAVENOUS
Status: CANCELLED | OUTPATIENT
Start: 2022-12-19

## 2022-12-19 RX ORDER — ACETAMINOPHEN 500 MG
500 TABLET ORAL
Status: DISCONTINUED | OUTPATIENT
Start: 2022-12-19 | End: 2022-12-19 | Stop reason: HOSPADM

## 2022-12-19 RX ORDER — HEPARIN 100 UNIT/ML
500 SYRINGE INTRAVENOUS
Status: DISCONTINUED | OUTPATIENT
Start: 2022-12-19 | End: 2022-12-19 | Stop reason: HOSPADM

## 2022-12-19 RX ORDER — SODIUM CHLORIDE 0.9 % (FLUSH) 0.9 %
10 SYRINGE (ML) INJECTION
Status: CANCELLED | OUTPATIENT
Start: 2022-12-19

## 2022-12-19 RX ORDER — ACETAMINOPHEN 500 MG
500 TABLET ORAL
Status: CANCELLED | OUTPATIENT
Start: 2022-12-19

## 2022-12-19 RX ORDER — ZOLEDRONIC ACID 5 MG/100ML
5 INJECTION, SOLUTION INTRAVENOUS
Status: COMPLETED | OUTPATIENT
Start: 2022-12-19 | End: 2022-12-19

## 2022-12-19 RX ORDER — HEPARIN 100 UNIT/ML
500 SYRINGE INTRAVENOUS
Status: CANCELLED | OUTPATIENT
Start: 2022-12-19

## 2022-12-19 RX ADMIN — SODIUM CHLORIDE: 0.9 INJECTION, SOLUTION INTRAVENOUS at 11:12

## 2022-12-19 RX ADMIN — ZOLEDRONIC ACID 5 MG: 5 INJECTION, SOLUTION INTRAVENOUS at 11:12

## 2022-12-19 RX ADMIN — ACETAMINOPHEN 500 MG: 500 TABLET ORAL at 11:12

## 2022-12-19 NOTE — PLAN OF CARE
Patient tolerated her infusion of Reclast. Patient took her own Tylenol at home prior to coming here. VSS. NAD.  Discussed discharge instructions. Verbalized understanding. Patient discharged to home per self.

## 2023-03-08 ENCOUNTER — TELEPHONE (OUTPATIENT)
Dept: PRIMARY CARE CLINIC | Facility: CLINIC | Age: 63
End: 2023-03-08
Payer: COMMERCIAL

## 2023-03-08 ENCOUNTER — OFFICE VISIT (OUTPATIENT)
Dept: PRIMARY CARE CLINIC | Facility: CLINIC | Age: 63
End: 2023-03-08
Payer: COMMERCIAL

## 2023-03-08 VITALS
WEIGHT: 123.69 LBS | SYSTOLIC BLOOD PRESSURE: 120 MMHG | HEIGHT: 59 IN | HEART RATE: 73 BPM | DIASTOLIC BLOOD PRESSURE: 80 MMHG | OXYGEN SATURATION: 98 % | BODY MASS INDEX: 24.93 KG/M2

## 2023-03-08 DIAGNOSIS — Z85.3 HISTORY OF BREAST CANCER: ICD-10-CM

## 2023-03-08 DIAGNOSIS — M19.049 HAND ARTHRITIS: ICD-10-CM

## 2023-03-08 DIAGNOSIS — K21.9 GERD WITHOUT ESOPHAGITIS: ICD-10-CM

## 2023-03-08 DIAGNOSIS — M81.0 AGE-RELATED OSTEOPOROSIS WITHOUT CURRENT PATHOLOGICAL FRACTURE: ICD-10-CM

## 2023-03-08 DIAGNOSIS — Z00.00 ANNUAL PHYSICAL EXAM: Primary | ICD-10-CM

## 2023-03-08 DIAGNOSIS — F51.01 PRIMARY INSOMNIA: ICD-10-CM

## 2023-03-08 PROCEDURE — 99396 PREV VISIT EST AGE 40-64: CPT | Mod: S$GLB,,, | Performed by: INTERNAL MEDICINE

## 2023-03-08 PROCEDURE — 3079F PR MOST RECENT DIASTOLIC BLOOD PRESSURE 80-89 MM HG: ICD-10-PCS | Mod: CPTII,S$GLB,, | Performed by: INTERNAL MEDICINE

## 2023-03-08 PROCEDURE — 1159F PR MEDICATION LIST DOCUMENTED IN MEDICAL RECORD: ICD-10-PCS | Mod: CPTII,S$GLB,, | Performed by: INTERNAL MEDICINE

## 2023-03-08 PROCEDURE — 3074F SYST BP LT 130 MM HG: CPT | Mod: CPTII,S$GLB,, | Performed by: INTERNAL MEDICINE

## 2023-03-08 PROCEDURE — 99999 PR PBB SHADOW E&M-EST. PATIENT-LVL III: CPT | Mod: PBBFAC,,, | Performed by: INTERNAL MEDICINE

## 2023-03-08 PROCEDURE — 99999 PR PBB SHADOW E&M-EST. PATIENT-LVL III: ICD-10-PCS | Mod: PBBFAC,,, | Performed by: INTERNAL MEDICINE

## 2023-03-08 PROCEDURE — 3079F DIAST BP 80-89 MM HG: CPT | Mod: CPTII,S$GLB,, | Performed by: INTERNAL MEDICINE

## 2023-03-08 PROCEDURE — 1159F MED LIST DOCD IN RCRD: CPT | Mod: CPTII,S$GLB,, | Performed by: INTERNAL MEDICINE

## 2023-03-08 PROCEDURE — 3074F PR MOST RECENT SYSTOLIC BLOOD PRESSURE < 130 MM HG: ICD-10-PCS | Mod: CPTII,S$GLB,, | Performed by: INTERNAL MEDICINE

## 2023-03-08 PROCEDURE — 3008F BODY MASS INDEX DOCD: CPT | Mod: CPTII,S$GLB,, | Performed by: INTERNAL MEDICINE

## 2023-03-08 PROCEDURE — 3008F PR BODY MASS INDEX (BMI) DOCUMENTED: ICD-10-PCS | Mod: CPTII,S$GLB,, | Performed by: INTERNAL MEDICINE

## 2023-03-08 PROCEDURE — 99396 PR PREVENTIVE VISIT,EST,40-64: ICD-10-PCS | Mod: S$GLB,,, | Performed by: INTERNAL MEDICINE

## 2023-03-08 RX ORDER — HYDROXYZINE HYDROCHLORIDE 25 MG/1
25 TABLET, FILM COATED ORAL NIGHTLY PRN
Qty: 30 TABLET | Refills: 3 | Status: SHIPPED | OUTPATIENT
Start: 2023-03-08 | End: 2023-06-29

## 2023-03-08 RX ORDER — CELECOXIB 200 MG/1
200 CAPSULE ORAL 2 TIMES DAILY
COMMUNITY
Start: 2023-02-16

## 2023-03-08 RX ORDER — TIZANIDINE 2 MG/1
2 TABLET ORAL NIGHTLY PRN
Qty: 30 TABLET | Refills: 0 | Status: SHIPPED | OUTPATIENT
Start: 2023-03-08 | End: 2023-03-30 | Stop reason: SDUPTHER

## 2023-03-08 NOTE — PROGRESS NOTES
[facility] Note    Chief Complaint      Chief Complaint   Patient presents with    Establish Care       History of Present Illness      Jayshree Bee is a 62 y.o. female with chronic conditions of hx of breast cancer, GERD, osteoporosis who presents today for: establish care and discuss recent symptoms.    Complains of worsened bilateral thumb arthritis pain and right 3rd trigger finger since having Reclast infusion.  Sees Dr. Putnam, ortho, who is recommending trigger finger release.  Pt would prefer trial of OT vs second opinion.    Complains of insomnia.  Present for years.  Has taken tizanidine in the past which helped.    Hx of breast cancer: Dx .  S/p lumpectomy.  S/P 5 yrs tamoxifen and 6 weeks of XRT.    GERD: Controlled on pantoprazole but has osteoporosis.    Osteoporosis: Sees Dr. Leonard, endocrinology.  Has tried actonel and recently reclast but caused severe flare of bilateral hand arthritis pains.  Takes calcium and Vit D (7052-4380 IU daily).  Starting with light weight bearing exercise.    Flu shot UTD .  TdAP .  COVID vaccine UTD, discussed booster.    Mammogram UTD .  PAP smear n/a  hysterectomy.    Cscope due with Dr. Rao.    Past Medical History:  Past Medical History:   Diagnosis Date    Breast cancer     DCIS RT breast    GERD (gastroesophageal reflux disease)     Osteoporosis        Past Surgical History:   has a past surgical history that includes Breast surgery (Bilateral, ); Breast lumpectomy (Right, ); Breast biopsy (Right, ); Hysterectomy (); Oophorectomy (); Augmentation of breast (Bilateral, ); Augmentation of breast (Bilateral, );  section (3/17/1996); Fracture surgery (2014); Cosmetic surgery (, ); and Shoulder surgery (Right).    Family History:  family history includes Arthritis in her maternal grandmother and mother; Breast cancer in her paternal grandmother; Cancer in her maternal  grandfather, paternal aunt, paternal aunt, paternal uncle, and paternal uncle; Diabetes in her mother; Early death in her father; Hearing loss in her father and paternal grandfather; Heart disease in her father and paternal uncle; Hyperlipidemia in her father and paternal aunt; Hypertension in her father and mother; Osteoporosis in her mother.     Social History:  Social History     Tobacco Use    Smoking status: Never    Smokeless tobacco: Never   Substance Use Topics    Alcohol use: Not Currently    Drug use: Never       I personally reviewed all past medical, surgical, social and family history.    Review of Systems   Constitutional:  Negative for chills, fever and malaise/fatigue.   Respiratory:  Negative for shortness of breath.    Cardiovascular:  Negative for chest pain.   Gastrointestinal:  Negative for constipation, diarrhea, nausea and vomiting.   Skin:  Negative for rash.   Neurological:  Negative for weakness.   All other systems reviewed and are negative.     Medications:  Outpatient Encounter Medications as of 3/8/2023   Medication Sig Dispense Refill    CELEBREX 200 mg capsule Take 200 mg by mouth 2 (two) times daily.      ESTRING 2 mg (7.5 mcg /24 hour) vaginal ring SMARTSIG:Ring Vaginal Every 3 Months      pantoprazole (PROTONIX) 40 MG tablet Take 40 mg by mouth every morning.      PENNSAID 20 mg/gram /actuation(2 %) sopm Apply topically.       No facility-administered encounter medications on file as of 3/8/2023.       Allergies:  Review of patient's allergies indicates:  No Known Allergies    Health Maintenance:  Immunization History   Administered Date(s) Administered    COVID-19, MRNA, LN-S, PF (Pfizer) (Purple Cap) 01/17/2021, 02/07/2021    Hepatitis B (recombinant) Adjuvanted, 2 dose 06/04/2019, 07/05/2019    Influenza - Quadrivalent - PF *Preferred* (6 months and older) 12/04/2017, 12/06/2018, 11/08/2019, 11/03/2020, 09/21/2022    PPD Test 05/14/2019    Tdap 06/13/2020      Health Maintenance  "  Topic Date Due    Hepatitis C Screening  Never done    Mammogram  01/24/2024    Lipid Panel  08/24/2027    TETANUS VACCINE  06/13/2030        Physical Exam      Vital Signs  Pulse: 73  SpO2: 98 %  BP: 120/80  BP Location: Right arm  Patient Position: Sitting  Pain Score: 0-No pain  Height and Weight  Height: 4' 11" (149.9 cm)  Weight: 56.1 kg (123 lb 10.9 oz)  BSA (Calculated - sq m): 1.53 sq meters  BMI (Calculated): 25  Weight in (lb) to have BMI = 25: 123.5]    Physical Exam  Vitals reviewed.   Constitutional:       Appearance: She is well-developed.   HENT:      Head: Normocephalic and atraumatic.      Right Ear: External ear normal.      Left Ear: External ear normal.   Cardiovascular:      Rate and Rhythm: Normal rate and regular rhythm.      Heart sounds: Normal heart sounds. No murmur heard.  Pulmonary:      Effort: Pulmonary effort is normal.      Breath sounds: Normal breath sounds. No wheezing or rales.   Abdominal:      General: Bowel sounds are normal. There is no distension.      Palpations: Abdomen is soft.      Tenderness: There is no abdominal tenderness.        Laboratory:  CBC:      CMP:        Invalid input(s): CREATININ  URINALYSIS:       LIPIDS:      TSH:      A1C:        Assessment/Plan     Jayshree Bee is a 62 y.o.female with:    1. Annual physical exam  Discussed diet and exercise, vaccines and cancer screening, risk factors.  Screening labs ordered.     2. Age-related osteoporosis without current pathological fracture  F/U with endocrinology regarding Reclast alternative.    3. GERD without esophagitis  Continue current meds.  Discussed implications on bone density  4. History of breast cancer  Mammogram surveillance  5. Hand arthritis  Referring to hand surgery for evaluation  6. Primary insomnia  Start hydroxyzine or can use tizanidine but less preferred    Chronic conditions status updated as per HPI.  Other than changes above, cont current medications and maintain follow up with " specialists.  No follow-ups on file.    No future appointments.    Cortez Miranda MD  [facility]

## 2023-03-09 ENCOUNTER — PATIENT OUTREACH (OUTPATIENT)
Dept: ADMINISTRATIVE | Facility: HOSPITAL | Age: 63
End: 2023-03-09
Payer: COMMERCIAL

## 2023-03-09 NOTE — PROGRESS NOTES
Health Maintenance Due   Topic Date Due    Hepatitis C Screening  Never done    HIV Screening  Never done    Colorectal Cancer Screening  Never done    Shingles Vaccine (1 of 2) Never done    COVID-19 Vaccine (3 - Booster for Pfizer series) 04/04/2021     Chart review done.  updated. Immunizations reviewed & updated. Care Everywhere updated.  Labs from 8/24/22 uploaded to chart.

## 2023-03-14 ENCOUNTER — PATIENT MESSAGE (OUTPATIENT)
Dept: ORTHOPEDICS | Facility: CLINIC | Age: 63
End: 2023-03-14
Payer: COMMERCIAL

## 2023-03-14 ENCOUNTER — PATIENT MESSAGE (OUTPATIENT)
Dept: ADMINISTRATIVE | Facility: HOSPITAL | Age: 63
End: 2023-03-14
Payer: COMMERCIAL

## 2023-03-14 ENCOUNTER — PATIENT OUTREACH (OUTPATIENT)
Dept: ADMINISTRATIVE | Facility: HOSPITAL | Age: 63
End: 2023-03-14
Payer: COMMERCIAL

## 2023-03-14 DIAGNOSIS — M79.642 BILATERAL HAND PAIN: Primary | ICD-10-CM

## 2023-03-14 DIAGNOSIS — M79.641 BILATERAL HAND PAIN: Primary | ICD-10-CM

## 2023-03-14 DIAGNOSIS — Z12.11 SCREENING FOR COLON CANCER: ICD-10-CM

## 2023-03-14 NOTE — PROGRESS NOTES
Health Maintenance Due   Topic Date Due    Hepatitis C Screening  Never done    HIV Screening  Never done    Colorectal Cancer Screening  Never done    Shingles Vaccine (1 of 2) Never done    COVID-19 Vaccine (3 - Booster for Pfizer series) 04/04/2021     Chart review done. HM updated. Immunizations reviewed & updated. Care Everywhere updated.  ANNAMARIE sent to [facility] - Dr. Rao for Colonoscopy.

## 2023-03-14 NOTE — LETTER
AUTHORIZATION FOR RELEASE OF   CONFIDENTIAL INFORMATION    Dear [facility] - Dr. Rao,    We are seeing Jayshree Bee, date of birth 1960, in the clinic at [facility]. Cortez Miranda MD is the patient's PCP. Jayshree Bee has an outstanding lab/procedure at the time we reviewed her chart. In order to help keep her health information updated, she has authorized us to request the following medical record(s):        (  )  MAMMOGRAM                                      (XX)  COLONOSCOPY      (  )  PAP SMEAR                                          (  )  OUTSIDE LAB RESULTS     (  )  DEXA SCAN                                          (  )  EYE EXAM            (  )  FOOT EXAM                                          (  )  ENTIRE RECORD     (  )  OUTSIDE IMMUNIZATIONS                 (  )  _______________         Please fax records to Cortez Miranda MD, [phone].     If you have any questions, please contact Shannon Springer LPN at [phone].           Patient Name: Jayshree Bee  : 1960  Patient Phone #: [phone]

## 2023-03-15 ENCOUNTER — HOSPITAL ENCOUNTER (OUTPATIENT)
Dept: RADIOLOGY | Facility: HOSPITAL | Age: 63
Discharge: HOME | End: 2023-03-15
Attending: ORTHOPAEDIC SURGERY
Payer: COMMERCIAL

## 2023-03-15 ENCOUNTER — OFFICE VISIT (OUTPATIENT)
Dept: ORTHOPEDICS | Facility: CLINIC | Age: 63
End: 2023-03-15
Payer: COMMERCIAL

## 2023-03-15 DIAGNOSIS — M67.441 GANGLION CYST OF FLEXOR TENDON SHEATH OF FINGER OF RIGHT HAND: ICD-10-CM

## 2023-03-15 DIAGNOSIS — M65.331 TRIGGER MIDDLE FINGER OF RIGHT HAND: ICD-10-CM

## 2023-03-15 DIAGNOSIS — M79.641 BILATERAL HAND PAIN: ICD-10-CM

## 2023-03-15 DIAGNOSIS — M19.049 HAND ARTHRITIS: ICD-10-CM

## 2023-03-15 DIAGNOSIS — M18.11 PRIMARY OSTEOARTHRITIS OF FIRST CARPOMETACARPAL JOINT OF RIGHT HAND: Primary | ICD-10-CM

## 2023-03-15 DIAGNOSIS — R22.31 FINGER MASS, RIGHT: ICD-10-CM

## 2023-03-15 DIAGNOSIS — M18.12 PRIMARY OSTEOARTHRITIS OF FIRST CARPOMETACARPAL JOINT OF LEFT HAND: ICD-10-CM

## 2023-03-15 DIAGNOSIS — M79.642 BILATERAL HAND PAIN: ICD-10-CM

## 2023-03-15 PROCEDURE — 73130 XR HAND COMPLETE 3 VIEWS BILATERAL: ICD-10-PCS | Mod: 26,,, | Performed by: RADIOLOGY

## 2023-03-15 PROCEDURE — 1159F MED LIST DOCD IN RCRD: CPT | Mod: CPTII,S$GLB,, | Performed by: ORTHOPAEDIC SURGERY

## 2023-03-15 PROCEDURE — 73130 X-RAY EXAM OF HAND: CPT | Mod: 26,,, | Performed by: RADIOLOGY

## 2023-03-15 PROCEDURE — 99999 PR PBB SHADOW E&M-EST. PATIENT-LVL III: ICD-10-PCS | Mod: PBBFAC,,, | Performed by: ORTHOPAEDIC SURGERY

## 2023-03-15 PROCEDURE — 20600 DRAIN/INJ JOINT/BURSA W/O US: CPT | Mod: 59,LT,S$GLB, | Performed by: ORTHOPAEDIC SURGERY

## 2023-03-15 PROCEDURE — 20550 NJX 1 TENDON SHEATH/LIGAMENT: CPT | Mod: RT,S$GLB,, | Performed by: ORTHOPAEDIC SURGERY

## 2023-03-15 PROCEDURE — 99204 PR OFFICE/OUTPT VISIT, NEW, LEVL IV, 45-59 MIN: ICD-10-PCS | Mod: 25,S$GLB,, | Performed by: ORTHOPAEDIC SURGERY

## 2023-03-15 PROCEDURE — 73130 X-RAY EXAM OF HAND: CPT | Mod: TC,50,PO

## 2023-03-15 PROCEDURE — 20600 SMALL JOINT ASPIRATION/INJECTION: L THUMB CMC: ICD-10-PCS | Mod: 59,LT,S$GLB, | Performed by: ORTHOPAEDIC SURGERY

## 2023-03-15 PROCEDURE — 99204 OFFICE O/P NEW MOD 45 MIN: CPT | Mod: 25,S$GLB,, | Performed by: ORTHOPAEDIC SURGERY

## 2023-03-15 PROCEDURE — 1160F PR REVIEW ALL MEDS BY PRESCRIBER/CLIN PHARMACIST DOCUMENTED: ICD-10-PCS | Mod: CPTII,S$GLB,, | Performed by: ORTHOPAEDIC SURGERY

## 2023-03-15 PROCEDURE — 20550 TENDON SHEATH: ICD-10-PCS | Mod: RT,S$GLB,, | Performed by: ORTHOPAEDIC SURGERY

## 2023-03-15 PROCEDURE — 1160F RVW MEDS BY RX/DR IN RCRD: CPT | Mod: CPTII,S$GLB,, | Performed by: ORTHOPAEDIC SURGERY

## 2023-03-15 PROCEDURE — 1159F PR MEDICATION LIST DOCUMENTED IN MEDICAL RECORD: ICD-10-PCS | Mod: CPTII,S$GLB,, | Performed by: ORTHOPAEDIC SURGERY

## 2023-03-15 PROCEDURE — 99999 PR PBB SHADOW E&M-EST. PATIENT-LVL III: CPT | Mod: PBBFAC,,, | Performed by: ORTHOPAEDIC SURGERY

## 2023-03-15 RX ADMIN — METHYLPREDNISOLONE ACETATE 40 MG: 40 INJECTION, SUSPENSION INTRA-ARTICULAR; INTRALESIONAL; INTRAMUSCULAR; SOFT TISSUE at 03:03

## 2023-03-15 NOTE — PROCEDURES
Small Joint Aspiration/Injection: L thumb CMC    Date/Time: 3/15/2023 3:00 PM  Performed by: Halie Mata MD  Authorized by: Halie Mata MD     Consent Done?:  Yes (Verbal)  Indications:  Pain  Timeout: prior to procedure the correct patient, procedure, and site was verified    Prep: patient was prepped and draped in usual sterile fashion      Local anesthesia used?: Yes    Anesthesia:  Local infiltration  Local anesthetic:  Lidocaine 1% without epinephrine  Location:  Thumb  Site:  L thumb CMC  Needle size:  25 G  Medications:  40 mg methylPREDNISolone acetate 40 mg/mL  Patient tolerance:  Patient tolerated the procedure well with no immediate complications  Tendon Sheath    Date/Time: 3/15/2023 3:00 PM  Performed by: Halie Mata MD  Authorized by: Halie Mata MD     Consent Done?:  Yes (Verbal)  Indications:  Pain  Timeout: prior to procedure the correct patient, procedure, and site was verified    Prep: patient was prepped and draped in usual sterile fashion      Local anesthesia used?: Yes    Anesthesia:  Local infiltration  Local anesthetic:  Lidocaine 1% without epinephrine  Anesthetic total (ml):  1    Location:  Long finger  Site:  R long flexor tendon sheath  Ultrasonic guidance for needle placement?: No    Needle size:  25 G  Approach:  Volar  Medications:  40 mg methylPREDNISolone acetate 40 mg/mL  Patient tolerance:  Patient tolerated the procedure well with no immediate complications

## 2023-03-20 ENCOUNTER — TELEPHONE (OUTPATIENT)
Dept: ORTHOPEDICS | Facility: CLINIC | Age: 63
End: 2023-03-20
Payer: COMMERCIAL

## 2023-03-20 NOTE — TELEPHONE ENCOUNTER
I called the patient regarding the message below. The patient did not answer, I left a voicemail with a call back number.     ----- Message from Michael Garcia sent at 3/15/2023  4:43 PM CDT -----  Regarding: 3D thumb brace  Can we check to see if she got her Right 3D thumb brace? Georgette is supposed to call to get her one because we were out at [place] 3/15/23    Michael

## 2023-03-25 LAB — HEMOCCULT STL QL IA: POSITIVE

## 2023-03-27 ENCOUNTER — PATIENT MESSAGE (OUTPATIENT)
Dept: PRIMARY CARE CLINIC | Facility: CLINIC | Age: 63
End: 2023-03-27
Payer: COMMERCIAL

## 2023-03-27 NOTE — PROGRESS NOTES
Home stool test positive.  This should be followed up with a colonoscopy.  I can refer to GI if needed.

## 2023-03-31 RX ORDER — METHYLPREDNISOLONE ACETATE 40 MG/ML
40 INJECTION, SUSPENSION INTRA-ARTICULAR; INTRALESIONAL; INTRAMUSCULAR; SOFT TISSUE
Status: DISCONTINUED | OUTPATIENT
Start: 2023-03-15 | End: 2023-03-31 | Stop reason: HOSPADM

## 2023-03-31 NOTE — PROGRESS NOTES
[facility]  History & Physical  Orthopedics    SUBJECTIVE:      COVID-19 attestation:  This patient was treated during the COVID-19 pandemic.  This was discussed with the patient, they are aware of our current policies and procedures, were given the option of delaying their visit and or switching to a virtual visit, delaying their surgery when applicable, and they elect to proceed.    Chief Complaint:   Chief Complaint   Patient presents with    Right Hand - Pain       Referring Provider: Cortez Miranda MD     History of Present Illness:  Patient is a 62 y.o. right hand dominant female who presents today with complaints of left basilar thumb pain.  She states that she fell about 8 weeks ago on the right wrist, she also had a steroid injection to the right long finger 4 months ago.  She fractured her right distal radius 8-9 years ago rollerblading and underwent open reduction internal fixation.  She has trouble with daily activities including gripping jars and twisting due to the thumb pain.  She also reports a mass on the dorsum of the right long finger which was at the PIP joint but is now moved to the middle phalanx, it is mobile and well-circumscribed.  She had a Reclast infusion which she feels made her hands feel worse.    The patient is a MRI tech at [employer]    The patient rates their pain as a 7/10.    Attempted treatment(s) and/or interventions include rest, activity modification, and anti-inflammatory medications.     The patient denies any fevers, chills, N/V, D/C and presents for evaluation.       Past Medical History:   Diagnosis Date    Breast cancer 2004    DCIS RT breast    GERD (gastroesophageal reflux disease)     Osteoporosis      Past Surgical History:   Procedure Laterality Date    AUGMENTATION OF BREAST Bilateral 2002    AUGMENTATION OF BREAST Bilateral 2014    BREAST BIOPSY Right 2004    BREAST LUMPECTOMY Right 2004    BREAST SURGERY Bilateral 2002      SECTION  3/17/1996    COSMETIC SURGERY  ,     Breast Augmentation, Breast Implant replacement and abdominoplasty    FRACTURE SURGERY  2014    Right Wrist    HYSTERECTOMY      complete    OOPHORECTOMY      w/ hysterectomy    SHOULDER SURGERY Right     rotator cuff/bicep tendon     Review of patient's allergies indicates:  No Known Allergies  Social History     Social History Narrative    Not on file     Family History   Problem Relation Age of Onset    Arthritis Mother         Osteoarthritis    Diabetes Mother     Hypertension Mother     Osteoporosis Mother     Early death Father         Heart Attack    Hearing loss Father     Heart disease Father         Triple bypass at 46/ Heart Attack death at 56    Hyperlipidemia Father     Hypertension Father     Cancer Paternal Aunt         Ovarian Ca    Hyperlipidemia Paternal Aunt     Cancer Paternal Aunt         Leiomyosarcoma    Cancer Paternal Uncle         Lung Ca    Cancer Paternal Uncle         Lung Ca    Heart disease Paternal Uncle         Early Death    Arthritis Maternal Grandmother         Crippling Arthritis  (RA)?    Cancer Maternal Grandfather         Stomach Cancer    Breast cancer Paternal Grandmother     Hearing loss Paternal Grandfather          Current Outpatient Medications:     CELEBREX 200 mg capsule, Take 200 mg by mouth 2 (two) times daily., Disp: , Rfl:     ESTRING 2 mg (7.5 mcg /24 hour) vaginal ring, SMARTSIG:Ring Vaginal Every 3 Months, Disp: , Rfl:     hydrOXYzine HCL (ATARAX) 25 MG tablet, Take 1 tablet (25 mg total) by mouth nightly as needed (Insomnia)., Disp: 30 tablet, Rfl: 3    pantoprazole (PROTONIX) 40 MG tablet, Take 40 mg by mouth every morning., Disp: , Rfl:     PENNSAID 20 mg/gram /actuation(2 %) sopm, Apply topically., Disp: , Rfl:     tiZANidine (ZANAFLEX) 2 MG tablet, TAKE 1 TABLET BY MOUTH NIGHTLY AS NEEDED., Disp: 30 tablet, Rfl: 3    ROS    Review of Systems:  Constitutional: no fever or chills  Eyes:  no visual changes  ENT: no nasal congestion or sore throat  Respiratory: no cough or shortness of breath  Cardiovascular: no chest pain  Gastrointestinal: no nausea or vomiting, tolerating diet  Musculoskeletal: pain and soreness    OBJECTIVE:      Vital Signs (Most Recent):  There were no vitals filed for this visit.  There is no height or weight on file to calculate BMI.    Physical Exam    Physical Exam:  Constitutional: The patient appears well-developed and well-nourished. No distress.   Head: Normocephalic and atraumatic.   Nose: Nose normal.   Eyes: Conjunctivae and EOM are normal.   Neck: No tracheal deviation present.   Cardiovascular: Normal rate and intact distal pulses.    Pulmonary/Chest: Effort normal. No respiratory distress.   Abdominal: There is no guarding.   Lymphatic: Negative for adenopathy   Neurological: The patient is alert.   Psychiatric: The patient has a normal mood and affect.     Cervical Exam:  ROM full, supple  Negative Spurling's  Negative Harden's    Bilateral Hand/Wrist Examination:    Observation/Inspection:  Swelling  see below    Deformity  none  Discoloration  none     Scars   none    Atrophy  none    HAND/WRIST EXAMINATION:  Finkelstein's Test   Neg  WHAT Test    Neg  Snuff box tenderness   Neg  Mayo's Test    Neg  Hook of Hamate Tenderness  Neg  CMC grind    Positive left  Circumduction test   Positive left  TFCC Compression Test  Neg    5 x 5 mm smooth compressible and well-circumscribed mass dorsum of right long finger middle phalanx, ganglion of tendon sheath right long finger volar A1 pulley, tender to palpation A1 pulley demonstrable catching, tender to palpation left thumb CMC joint decreased CMC motion, otherwise bilateral ROM hand/wrist/elbow full    Neurovascular Exam:  Digits WWP, brisk CR < 3s throughout  NVI motor/LTS to M/R/U nerves, radial pulse 2+  2+ biceps and brachioradialis reflexes  Tinel's Test - Carpal Tunnel  Neg  Tinel's Test - Cubital  Tunnel  Neg  Phalen's Test    Neg  Median Nerve Compression Test Neg    Diagnostic Results:     X-rays AP, lateral and oblique bilateral hands taken today are independently reviewed by me and show bilateral Eaton stage II basilar thumb arthritis as well as retained distal radius hardware right wrist.       ASSESSMENT/PLAN:      62 y.o. yo female with   Encounter Diagnoses   Name Primary?    Hand arthritis     Primary osteoarthritis of first carpometacarpal joint of right hand Yes    Primary osteoarthritis of first carpometacarpal joint of left hand     Trigger middle finger of right hand     Ganglion cyst of flexor tendon sheath of finger of right hand     Finger mass, right       Plan:  We have discussed the natural history of basilar thumb arthritis including treatment options such as splinting, oral and topical anti-inflammatories, cortisone injections and surgery. I have given thumb 3D braces for comfort.  We have discussed surgical excision of her right long finger mass.    -I have offered her a selective injection. I have explained the risks, benefits, and alternatives of the procedure in detail.  The patient voices understanding and all questions have been answered. The patient agrees to proceed as planned. So after a sterile prep of the skin in the normal fashion the left thumb CMC joint and right long finger flexor sheath was injected using a 25 gauge needle with a combination of 1cc 1% plain lidocaine and 40 mg of methylprednisolone.  The patient is cautioned and immediate relief of pain is secondary to the local anesthetic and will be temporary.  After the anesthetic wears off there may be a increase in pain that may last for a few hours or a few days and they should use ice to help alleviate this flair up of pain. Patient tolerated the procedure well.    - F/U as needed for any worsening of symptoms  - Call with any questions/concerns in the interim       The patient's pathophysiology was explained in  detail with reference to x-rays, models, other visual aids as appropriate.  Treatment options were discussed in detail.  Questions were invited and answered to the patient's satisfaction. I reviewed Primary care , and other specialty's notes to better coordinate patient's care.          Halie Mata MD    Please be aware that this note has been generated with the assistance of MModal voice-to-text.  Please excuse any spelling or grammatical errors.

## 2023-04-20 ENCOUNTER — PATIENT MESSAGE (OUTPATIENT)
Dept: PRIMARY CARE CLINIC | Facility: CLINIC | Age: 63
End: 2023-04-20
Payer: COMMERCIAL

## 2023-05-01 ENCOUNTER — PATIENT MESSAGE (OUTPATIENT)
Dept: INTERNAL MEDICINE | Facility: CLINIC | Age: 63
End: 2023-05-01
Payer: COMMERCIAL

## 2023-05-01 DIAGNOSIS — D64.9 ANEMIA, UNSPECIFIED TYPE: Primary | ICD-10-CM

## 2023-05-02 ENCOUNTER — PATIENT MESSAGE (OUTPATIENT)
Dept: INTERNAL MEDICINE | Facility: CLINIC | Age: 63
End: 2023-05-02
Payer: COMMERCIAL

## 2023-05-02 NOTE — TELEPHONE ENCOUNTER
Pt reports recent hospital stay for GI Bleed    Pt saw Gastro today  Gastro wants pt to have labs redone to f/u on anemia    Pt had requested records be sent to PCP

## 2023-05-03 ENCOUNTER — PATIENT MESSAGE (OUTPATIENT)
Dept: INTERNAL MEDICINE | Facility: CLINIC | Age: 63
End: 2023-05-03
Payer: COMMERCIAL

## 2023-05-04 ENCOUNTER — PATIENT MESSAGE (OUTPATIENT)
Dept: INTERNAL MEDICINE | Facility: CLINIC | Age: 63
End: 2023-05-04
Payer: COMMERCIAL

## 2023-05-04 NOTE — TELEPHONE ENCOUNTER
If she has lab in for her PCP and has a PCP, I would direct her in that direction since Sabra is out.

## 2023-05-04 NOTE — TELEPHONE ENCOUNTER
Scheduled pt with NANCY Hoover for May22nd @2pm    Pt requesting CBC w/ diff at behest of Dr. Jalen Rao has not placed any active orders  Pt does have orders for a CBC w/ diff from PCP as well as other labwork    Should new labwork be ordered? Or should labwork pended by PCP be used?     Please advise

## 2023-05-05 ENCOUNTER — PATIENT MESSAGE (OUTPATIENT)
Dept: INTERNAL MEDICINE | Facility: CLINIC | Age: 63
End: 2023-05-05
Payer: COMMERCIAL

## 2023-05-05 NOTE — TELEPHONE ENCOUNTER
CBC order in  Phelan to schedule prior to appt      Future Appointments   Date Time Provider Department Center   5/22/2023  2:00 PM Sabra Hoover PA-C [facility]  [facility]

## 2023-05-17 ENCOUNTER — LAB VISIT (OUTPATIENT)
Dept: LAB | Facility: HOSPITAL | Age: 63
End: 2023-05-17
Attending: PHYSICIAN ASSISTANT
Payer: COMMERCIAL

## 2023-05-17 DIAGNOSIS — D64.9 ANEMIA, UNSPECIFIED TYPE: ICD-10-CM

## 2023-05-17 LAB
BASOPHILS # BLD AUTO: 0.06 K/UL (ref 0–0.2)
BASOPHILS NFR BLD: 0.8 % (ref 0–1.9)
DIFFERENTIAL METHOD: ABNORMAL
EOSINOPHIL # BLD AUTO: 0.1 K/UL (ref 0–0.5)
EOSINOPHIL NFR BLD: 1.1 % (ref 0–8)
ERYTHROCYTE [DISTWIDTH] IN BLOOD BY AUTOMATED COUNT: 13.6 % (ref 11.5–14.5)
HCT VFR BLD AUTO: 37.6 % (ref 37–48.5)
HGB BLD-MCNC: 11.4 G/DL (ref 12–16)
IMM GRANULOCYTES # BLD AUTO: 0.02 K/UL (ref 0–0.04)
IMM GRANULOCYTES NFR BLD AUTO: 0.3 % (ref 0–0.5)
LYMPHOCYTES # BLD AUTO: 2.4 K/UL (ref 1–4.8)
LYMPHOCYTES NFR BLD: 29.8 % (ref 18–48)
MCH RBC QN AUTO: 27.7 PG (ref 27–31)
MCHC RBC AUTO-ENTMCNC: 30.3 G/DL (ref 32–36)
MCV RBC AUTO: 92 FL (ref 82–98)
MONOCYTES # BLD AUTO: 0.6 K/UL (ref 0.3–1)
MONOCYTES NFR BLD: 7.2 % (ref 4–15)
NEUTROPHILS # BLD AUTO: 4.9 K/UL (ref 1.8–7.7)
NEUTROPHILS NFR BLD: 60.8 % (ref 38–73)
NRBC BLD-RTO: 0 /100 WBC
PLATELET # BLD AUTO: 317 K/UL (ref 150–450)
PMV BLD AUTO: 10.1 FL (ref 9.2–12.9)
RBC # BLD AUTO: 4.11 M/UL (ref 4–5.4)
WBC # BLD AUTO: 7.96 K/UL (ref 3.9–12.7)

## 2023-05-17 PROCEDURE — 85025 COMPLETE CBC W/AUTO DIFF WBC: CPT | Performed by: PHYSICIAN ASSISTANT

## 2023-05-17 PROCEDURE — 36415 COLL VENOUS BLD VENIPUNCTURE: CPT | Performed by: PHYSICIAN ASSISTANT

## 2023-05-22 ENCOUNTER — PATIENT MESSAGE (OUTPATIENT)
Dept: INTERNAL MEDICINE | Facility: CLINIC | Age: 63
End: 2023-05-22
Payer: COMMERCIAL

## 2023-05-24 NOTE — TELEPHONE ENCOUNTER
Earliest virtual appt available is 5/31/23    Pt reports asking labwork be sent to Dr. Miranda's  Labs do not appear to be in chart      Should this pt be seen sooner? And do we need to request the labwork ourselves?     Please advise

## 2023-05-25 NOTE — TELEPHONE ENCOUNTER
31st is fine for a virtual. Pt would have to contact outside facility and ask them to fax medical records to us directly.

## 2023-06-29 DIAGNOSIS — F51.01 PRIMARY INSOMNIA: ICD-10-CM

## 2023-06-29 RX ORDER — HYDROXYZINE HYDROCHLORIDE 25 MG/1
TABLET, FILM COATED ORAL
Qty: 30 TABLET | Refills: 3 | Status: SHIPPED | OUTPATIENT
Start: 2023-06-29 | End: 2023-10-23

## 2023-07-21 ENCOUNTER — PATIENT MESSAGE (OUTPATIENT)
Dept: ENDOSCOPY | Facility: HOSPITAL | Age: 63
End: 2023-07-21
Payer: COMMERCIAL

## 2023-07-21 ENCOUNTER — TELEPHONE (OUTPATIENT)
Dept: ENDOSCOPY | Facility: HOSPITAL | Age: 63
End: 2023-07-21
Payer: COMMERCIAL

## 2023-07-21 NOTE — TELEPHONE ENCOUNTER
Contacted the patient to schedule an endoscopy procedure(s) Colonoscopy. The patient did not answer the call and left a voice message requesting a call back.

## 2023-07-27 DIAGNOSIS — F51.01 PRIMARY INSOMNIA: ICD-10-CM

## 2023-07-27 RX ORDER — TIZANIDINE 2 MG/1
TABLET ORAL
Qty: 30 TABLET | Refills: 3 | Status: SHIPPED | OUTPATIENT
Start: 2023-07-27 | End: 2023-11-20

## 2023-09-15 LAB
CHOLEST SERPL-MSCNC: 186 MG/DL (ref 0–200)
EGFR: 100.6
HDLC SERPL-MCNC: 64 MG/DL (ref 35–70)
LDLC SERPL CALC-MCNC: 112 MG/DL (ref 0–160)
TRIGL SERPL-MCNC: 118 MG/DL (ref 40–160)

## 2023-10-03 ENCOUNTER — PATIENT MESSAGE (OUTPATIENT)
Dept: PRIMARY CARE CLINIC | Facility: CLINIC | Age: 63
End: 2023-10-03
Payer: COMMERCIAL

## 2023-10-22 DIAGNOSIS — F51.01 PRIMARY INSOMNIA: ICD-10-CM

## 2023-10-23 RX ORDER — HYDROXYZINE HYDROCHLORIDE 25 MG/1
TABLET, FILM COATED ORAL
Qty: 30 TABLET | Refills: 3 | Status: SHIPPED | OUTPATIENT
Start: 2023-10-23 | End: 2024-02-06

## 2023-10-30 ENCOUNTER — PATIENT OUTREACH (OUTPATIENT)
Dept: ADMINISTRATIVE | Facility: HOSPITAL | Age: 63
End: 2023-10-30
Payer: COMMERCIAL

## 2023-10-30 NOTE — PROGRESS NOTES
Health Maintenance Due   Topic Date Due    Hepatitis C Screening  Never done    HIV Screening  Never done    Shingles Vaccine (1 of 2) Never done    RSV Vaccine (Age 60+) (1 - 1-dose 60+ series) Never done    Influenza Vaccine (1) 09/01/2023    COVID-19 Vaccine (3 - 2023-24 season) 09/01/2023    Mammogram  01/24/2024     Triggered LINKS and reconciled immunizations. Updated Care Everywhere. Imported outside lab results for lipid panel, eGFR received from [facility] into . Chart review completed.

## 2023-10-31 ENCOUNTER — OFFICE VISIT (OUTPATIENT)
Dept: INTERNAL MEDICINE | Facility: CLINIC | Age: 63
End: 2023-10-31
Payer: COMMERCIAL

## 2023-10-31 ENCOUNTER — PATIENT MESSAGE (OUTPATIENT)
Dept: INTERNAL MEDICINE | Facility: CLINIC | Age: 63
End: 2023-10-31

## 2023-10-31 VITALS
SYSTOLIC BLOOD PRESSURE: 98 MMHG | DIASTOLIC BLOOD PRESSURE: 72 MMHG | HEIGHT: 60 IN | TEMPERATURE: 99 F | BODY MASS INDEX: 23.29 KG/M2 | RESPIRATION RATE: 18 BRPM | HEART RATE: 74 BPM | OXYGEN SATURATION: 98 % | WEIGHT: 118.63 LBS

## 2023-10-31 DIAGNOSIS — R05.1 ACUTE COUGH: ICD-10-CM

## 2023-10-31 DIAGNOSIS — J04.0 LARYNGITIS: ICD-10-CM

## 2023-10-31 DIAGNOSIS — J02.9 PHARYNGITIS, UNSPECIFIED ETIOLOGY: Primary | ICD-10-CM

## 2023-10-31 DIAGNOSIS — R05.1 ACUTE COUGH: Primary | ICD-10-CM

## 2023-10-31 LAB
GROUP A STREP, MOLECULAR: NEGATIVE
INFLUENZA A, MOLECULAR: NEGATIVE
INFLUENZA B, MOLECULAR: NEGATIVE
SARS-COV-2 RNA RESP QL NAA+PROBE: NOT DETECTED
SPECIMEN SOURCE: NORMAL

## 2023-10-31 PROCEDURE — 87635 SARS-COV-2 COVID-19 AMP PRB: CPT | Performed by: NURSE PRACTITIONER

## 2023-10-31 PROCEDURE — 3074F PR MOST RECENT SYSTOLIC BLOOD PRESSURE < 130 MM HG: ICD-10-PCS | Mod: CPTII,S$GLB,, | Performed by: NURSE PRACTITIONER

## 2023-10-31 PROCEDURE — 1160F RVW MEDS BY RX/DR IN RCRD: CPT | Mod: CPTII,S$GLB,, | Performed by: NURSE PRACTITIONER

## 2023-10-31 PROCEDURE — 99213 OFFICE O/P EST LOW 20 MIN: CPT | Mod: 25,S$GLB,, | Performed by: NURSE PRACTITIONER

## 2023-10-31 PROCEDURE — 99999 PR PBB SHADOW E&M-EST. PATIENT-LVL IV: CPT | Mod: PBBFAC,,, | Performed by: NURSE PRACTITIONER

## 2023-10-31 PROCEDURE — 1159F PR MEDICATION LIST DOCUMENTED IN MEDICAL RECORD: ICD-10-PCS | Mod: CPTII,S$GLB,, | Performed by: NURSE PRACTITIONER

## 2023-10-31 PROCEDURE — 1159F MED LIST DOCD IN RCRD: CPT | Mod: CPTII,S$GLB,, | Performed by: NURSE PRACTITIONER

## 2023-10-31 PROCEDURE — 3008F PR BODY MASS INDEX (BMI) DOCUMENTED: ICD-10-PCS | Mod: CPTII,S$GLB,, | Performed by: NURSE PRACTITIONER

## 2023-10-31 PROCEDURE — 87651 STREP A DNA AMP PROBE: CPT | Mod: PO | Performed by: NURSE PRACTITIONER

## 2023-10-31 PROCEDURE — 87502 INFLUENZA DNA AMP PROBE: CPT | Mod: PO | Performed by: NURSE PRACTITIONER

## 2023-10-31 PROCEDURE — 3074F SYST BP LT 130 MM HG: CPT | Mod: CPTII,S$GLB,, | Performed by: NURSE PRACTITIONER

## 2023-10-31 PROCEDURE — 3078F DIAST BP <80 MM HG: CPT | Mod: CPTII,S$GLB,, | Performed by: NURSE PRACTITIONER

## 2023-10-31 PROCEDURE — 3008F BODY MASS INDEX DOCD: CPT | Mod: CPTII,S$GLB,, | Performed by: NURSE PRACTITIONER

## 2023-10-31 PROCEDURE — 3078F PR MOST RECENT DIASTOLIC BLOOD PRESSURE < 80 MM HG: ICD-10-PCS | Mod: CPTII,S$GLB,, | Performed by: NURSE PRACTITIONER

## 2023-10-31 PROCEDURE — 99213 PR OFFICE/OUTPT VISIT, EST, LEVL III, 20-29 MIN: ICD-10-PCS | Mod: 25,S$GLB,, | Performed by: NURSE PRACTITIONER

## 2023-10-31 PROCEDURE — 96372 PR INJECTION,THERAP/PROPH/DIAG2ST, IM OR SUBCUT: ICD-10-PCS | Mod: S$GLB,,, | Performed by: NURSE PRACTITIONER

## 2023-10-31 PROCEDURE — 99999 PR PBB SHADOW E&M-EST. PATIENT-LVL IV: ICD-10-PCS | Mod: PBBFAC,,, | Performed by: NURSE PRACTITIONER

## 2023-10-31 PROCEDURE — 1160F PR REVIEW ALL MEDS BY PRESCRIBER/CLIN PHARMACIST DOCUMENTED: ICD-10-PCS | Mod: CPTII,S$GLB,, | Performed by: NURSE PRACTITIONER

## 2023-10-31 PROCEDURE — 96372 THER/PROPH/DIAG INJ SC/IM: CPT | Mod: S$GLB,,, | Performed by: NURSE PRACTITIONER

## 2023-10-31 NOTE — PROGRESS NOTES
Subjective:       Patient ID: Jayshree Bee is a 62 y.o. female.    Chief Complaint: Sore Throat (Sx began Friday 10/27; pt c/o redness and irritation in back of throat ), Cough (Productive cough; yellow-green phlegm; cough is worst at night ), and Hoarse      Patient is a 62 y.o. female who traditionally follows with Cortez Miranda MD presenting today for:    Upper Respiratory Infection  Patient complains of symptoms of a URI. Symptoms include cough described as productive of green sputum, nasal congestion, and sore throat. Onset of symptoms was 4 days ago, and has been gradually worsening since that time. Treatment to date:  Tylenol and Mucinex .    Review of patient's allergies indicates:  No Known Allergies    Medication List with Changes/Refills   Current Medications    CELEBREX 200 MG CAPSULE    Take 200 mg by mouth 2 (two) times daily.    ESTRING 2 MG (7.5 MCG /24 HOUR) VAGINAL RING    SMARTSIG:Ring Vaginal Every 3 Months    HYDROXYZINE HCL (ATARAX) 25 MG TABLET    TAKE 1 TABLET BY MOUTH NIGHTLY AS NEEDED (INSOMNIA).    PANTOPRAZOLE (PROTONIX) 40 MG TABLET    Take 40 mg by mouth every morning.    PENNSAID 20 MG/GRAM /ACTUATION(2 %) SOPM    Apply topically.    TIZANIDINE (ZANAFLEX) 2 MG TABLET    TAKE 1 TABLET BY MOUTH EVERY DAY AT NIGHT AS NEEDED     Medical, social and surgical history has been reviewed with the patient.      Review of Systems   Constitutional:  Positive for malaise/fatigue. Negative for chills and fever.   HENT:  Positive for sore throat.         Voice Change   Respiratory:  Positive for cough. Negative for shortness of breath.    Cardiovascular:  Negative for chest pain.   Neurological:  Negative for dizziness and headaches.      Objective:   BP 98/72 (BP Location: Left arm, Patient Position: Sitting, BP Method: Medium (Manual))   Pulse 74   Temp 98.5 °F (36.9 °C) (Temporal)   Resp 18   Ht 5' (1.524 m)   Wt 53.8 kg (118 lb 9.7 oz)   SpO2 98%   BMI 23.16 kg/m²     Physical  Exam  Vitals reviewed.   Constitutional:       Appearance: Normal appearance.   HENT:      Head: Normocephalic and atraumatic.      Ears:      Comments: Cerumen debris obstructs view of TM     Nose:      Right Turbinates: Swollen.      Left Turbinates: Swollen.      Right Sinus: No maxillary sinus tenderness or frontal sinus tenderness.      Left Sinus: No maxillary sinus tenderness or frontal sinus tenderness.      Mouth/Throat:      Pharynx: Posterior oropharyngeal erythema present.   Cardiovascular:      Rate and Rhythm: Normal rate and regular rhythm.      Heart sounds: Normal heart sounds. No murmur heard.  Pulmonary:      Effort: Pulmonary effort is normal.      Breath sounds: Normal breath sounds. No wheezing.   Lymphadenopathy:      Head:      Right side of head: No submental, submandibular or tonsillar adenopathy.      Left side of head: No submental, submandibular or tonsillar adenopathy.   Skin:     General: Skin is warm and dry.   Neurological:      Mental Status: She is alert and oriented to person, place, and time.         Assessment and Plan:     1. Pharyngitis, unspecified etiology    Salt water gargles, tylenol for pain.     - COVID-19 Routine Screening  - Influenza A & B by Molecular  - Group A Strep, Molecular    2. Laryngitis    - methylPREDNISolone sodium succinate injection 40 mg    3. Acute cough    Patient has Tessalon at home that she is using for cough as well as a syrup as prescribed by ENT in the past.

## 2023-11-01 RX ORDER — BENZONATATE 200 MG/1
200 CAPSULE ORAL 3 TIMES DAILY PRN
Qty: 30 CAPSULE | Refills: 0 | Status: SHIPPED | OUTPATIENT
Start: 2023-11-01 | End: 2023-11-11

## 2023-11-01 RX ORDER — PROMETHAZINE HYDROCHLORIDE AND DEXTROMETHORPHAN HYDROBROMIDE 6.25; 15 MG/5ML; MG/5ML
5 SYRUP ORAL EVERY 8 HOURS PRN
Qty: 118 ML | Refills: 0 | Status: SHIPPED | OUTPATIENT
Start: 2023-11-01 | End: 2023-11-11

## 2023-11-02 LAB — CRC RECOMMENDATION EXT: NORMAL

## 2023-11-06 ENCOUNTER — PATIENT OUTREACH (OUTPATIENT)
Dept: ADMINISTRATIVE | Facility: HOSPITAL | Age: 63
End: 2023-11-06
Payer: COMMERCIAL

## 2023-11-06 NOTE — PROGRESS NOTES
HYDROcodone-acetaminophen  MG Oral Tab 90 tablet 0 5/1/2018    Sig :  Take 1 tablet by mouth every 4 (four) hours as needed       Pantoprazole Sodium 40 MG Oral Tab EC 30 tablet 2 11/27/2017    Sig :  TAKE 1 TABLET(40 MG) BY MOUTH EVERY MORNING BEFOR Health Maintenance Due   Topic Date Due    Hepatitis C Screening  Never done    HIV Screening  Never done    Shingles Vaccine (1 of 2) Never done    RSV Vaccine (Age 60+) (1 - 1-dose 60+ series) Never done    Influenza Vaccine (1) 09/01/2023    COVID-19 Vaccine (3 - 2023-24 season) 09/01/2023    Mammogram  01/24/2024     Triggered LINKS and reconciled immunizations. Updated Care Everywhere. Imported outside colonoscopy results received from [facility] into - no follow up frequency specified. Chart review completed.

## 2023-11-18 DIAGNOSIS — F51.01 PRIMARY INSOMNIA: ICD-10-CM

## 2023-11-18 NOTE — TELEPHONE ENCOUNTER
No care due was identified.  [facility] Embedded Care Due Messages. Reference number: [id number].   11/18/2023 2:20:19 AM CST

## 2023-11-20 RX ORDER — TIZANIDINE 2 MG/1
TABLET ORAL
Qty: 30 TABLET | Refills: 2 | Status: SHIPPED | OUTPATIENT
Start: 2023-11-20 | End: 2024-03-13 | Stop reason: SDUPTHER

## 2023-12-17 ENCOUNTER — PATIENT MESSAGE (OUTPATIENT)
Dept: PRIMARY CARE CLINIC | Facility: CLINIC | Age: 63
End: 2023-12-17
Payer: COMMERCIAL

## 2024-01-22 ENCOUNTER — PATIENT MESSAGE (OUTPATIENT)
Dept: PRIMARY CARE CLINIC | Facility: CLINIC | Age: 64
End: 2024-01-22
Payer: COMMERCIAL

## 2024-01-22 NOTE — TELEPHONE ENCOUNTER
Changed to [facility] home infusion.  Please direct message supervisor to make sure this has been done correctly

## 2024-02-06 DIAGNOSIS — F51.01 PRIMARY INSOMNIA: ICD-10-CM

## 2024-02-06 RX ORDER — HYDROXYZINE HYDROCHLORIDE 25 MG/1
TABLET, FILM COATED ORAL
Qty: 30 TABLET | Refills: 1 | Status: SHIPPED | OUTPATIENT
Start: 2024-02-06 | End: 2024-03-08

## 2024-03-01 DIAGNOSIS — F51.01 PRIMARY INSOMNIA: ICD-10-CM

## 2024-03-08 RX ORDER — HYDROXYZINE HYDROCHLORIDE 25 MG/1
TABLET, FILM COATED ORAL
Qty: 90 TABLET | Refills: 0 | Status: SHIPPED | OUTPATIENT
Start: 2024-03-08 | End: 2024-03-13

## 2024-03-13 ENCOUNTER — OFFICE VISIT (OUTPATIENT)
Dept: PRIMARY CARE CLINIC | Facility: CLINIC | Age: 64
End: 2024-03-13
Payer: COMMERCIAL

## 2024-03-13 VITALS
BODY MASS INDEX: 23.77 KG/M2 | WEIGHT: 121.69 LBS | HEART RATE: 85 BPM | OXYGEN SATURATION: 98 % | SYSTOLIC BLOOD PRESSURE: 110 MMHG | DIASTOLIC BLOOD PRESSURE: 76 MMHG

## 2024-03-13 DIAGNOSIS — K21.9 GERD WITHOUT ESOPHAGITIS: ICD-10-CM

## 2024-03-13 DIAGNOSIS — E78.2 HYPERLIPIDEMIA, MIXED: ICD-10-CM

## 2024-03-13 DIAGNOSIS — Z00.00 ANNUAL PHYSICAL EXAM: ICD-10-CM

## 2024-03-13 DIAGNOSIS — F51.01 PRIMARY INSOMNIA: ICD-10-CM

## 2024-03-13 DIAGNOSIS — Z12.31 SCREENING MAMMOGRAM, ENCOUNTER FOR: ICD-10-CM

## 2024-03-13 DIAGNOSIS — M81.0 AGE-RELATED OSTEOPOROSIS WITHOUT CURRENT PATHOLOGICAL FRACTURE: ICD-10-CM

## 2024-03-13 DIAGNOSIS — Z85.3 HISTORY OF BREAST CANCER: Primary | ICD-10-CM

## 2024-03-13 PROCEDURE — 1159F MED LIST DOCD IN RCRD: CPT | Mod: CPTII,S$GLB,, | Performed by: INTERNAL MEDICINE

## 2024-03-13 PROCEDURE — 3074F SYST BP LT 130 MM HG: CPT | Mod: CPTII,S$GLB,, | Performed by: INTERNAL MEDICINE

## 2024-03-13 PROCEDURE — 99397 PER PM REEVAL EST PAT 65+ YR: CPT | Mod: S$GLB,,, | Performed by: INTERNAL MEDICINE

## 2024-03-13 PROCEDURE — 99999 PR PBB SHADOW E&M-EST. PATIENT-LVL IV: CPT | Mod: PBBFAC,,, | Performed by: INTERNAL MEDICINE

## 2024-03-13 PROCEDURE — 3078F DIAST BP <80 MM HG: CPT | Mod: CPTII,S$GLB,, | Performed by: INTERNAL MEDICINE

## 2024-03-13 PROCEDURE — 3008F BODY MASS INDEX DOCD: CPT | Mod: CPTII,S$GLB,, | Performed by: INTERNAL MEDICINE

## 2024-03-13 PROCEDURE — 1125F AMNT PAIN NOTED PAIN PRSNT: CPT | Mod: CPTII,S$GLB,, | Performed by: INTERNAL MEDICINE

## 2024-03-13 RX ORDER — TIZANIDINE 2 MG/1
TABLET ORAL
Qty: 30 TABLET | Refills: 2 | Status: SHIPPED | OUTPATIENT
Start: 2024-03-13 | End: 2024-05-30

## 2024-03-13 NOTE — PROGRESS NOTES
[facility] Note    Chief Complaint      Chief Complaint   Patient presents with    Annual Exam       History of Present Illness      Jayshree Bee is a 63 y.o. female with chronic conditions of hx of breast cancer, GERD, osteoporosis, insomnia who presents today for: annual preventative visit.  Had seen Dr. Mata, hand surgery for trigger finger and ganglion cysts.  Had procedure to treat and helped for a while, but has recurred.    Had episode of hematemesis.  Evaluated by Dr. Rao with EGD showing no ulcer.  Had a fall and injured neck.  Has been seeing Dr. Pope for injections.  On celebrex but cautious due to hx of GI bleeding.   Hx of breast cancer: Dx .  S/p lumpectomy.  S/P 5 yrs tamoxifen and 6 weeks of XRT.  Pt looking to establish with new gynecologist on the [facility].  GERD: Controlled on pantoprazole but has osteoporosis.    Osteoporosis: Sees Dr. Leonard, endocrinology.  Has tried actonel and recently reclast but caused severe flare of bilateral hand arthritis pains.  Takes calcium and Vit D (6049-2610 IU daily).  Light weight bearing exercise.    Flu shot .  TdAP .  COVID vaccine UTD, discussed booster.    Mammogram UTD .  PAP smear n/a / hysterectomy. DEXA , high FRAX osteopenia.  Cscope UTD with Dr. Rao, no polyps, 10 yr interval.     Past Medical History:  Past Medical History:   Diagnosis Date    Breast cancer     DCIS RT breast    GERD (gastroesophageal reflux disease)     Osteoporosis        Past Surgical History:   has a past surgical history that includes Breast surgery (Bilateral, ); Breast lumpectomy (Right, ); Breast biopsy (Right, ); Hysterectomy (); Oophorectomy (); Augmentation of breast (Bilateral, ); Augmentation of breast (Bilateral, );  section (3/17/1996); Fracture surgery (2014); Cosmetic surgery (, ); and Shoulder surgery (Right).    Family History:  family history includes  Arthritis in her maternal grandmother and mother; Breast cancer in her paternal grandmother; Cancer in her maternal grandfather, paternal aunt, paternal aunt, paternal uncle, and paternal uncle; Diabetes in her mother; Early death in her father; Hearing loss in her father and paternal grandfather; Heart disease in her father and paternal uncle; Hyperlipidemia in her father and paternal aunt; Hypertension in her father and mother; Osteoporosis in her mother.     Social History:  Social History     Tobacco Use    Smoking status: Never    Smokeless tobacco: Never   Substance Use Topics    Alcohol use: Not Currently    Drug use: Not Currently       I personally reviewed all past medical, surgical, social and family history.    Review of Systems   Constitutional:  Negative for chills, fever and malaise/fatigue.   Respiratory:  Negative for shortness of breath.    Cardiovascular:  Negative for chest pain.   Gastrointestinal:  Negative for constipation, diarrhea, nausea and vomiting.   Skin:  Negative for rash.   Neurological:  Negative for weakness.   All other systems reviewed and are negative.       Medications:  Outpatient Encounter Medications as of 3/13/2024   Medication Sig Dispense Refill    CELEBREX 200 mg capsule Take 200 mg by mouth 2 (two) times daily.      pantoprazole (PROTONIX) 40 MG tablet Take 40 mg by mouth every morning.      PENNSAID 20 mg/gram /actuation(2 %) sopm Apply topically.      tiZANidine (ZANAFLEX) 2 MG tablet TAKE 1 TABLET BY MOUTH EVERY DAY AT NIGHT AS NEEDED 30 tablet 2    [DISCONTINUED] azithromycin (Z-ZEE) 250 MG tablet Take 250 mg by mouth once daily.      [DISCONTINUED] benzonatate (TESSALON) 200 MG capsule Take 200 mg by mouth 3 (three) times daily as needed for Cough (x10 days).      [DISCONTINUED] ESTRING 2 mg (7.5 mcg /24 hour) vaginal ring SMARTSIG:Ring Vaginal Every 3 Months      [DISCONTINUED] hydrOXYzine HCL (ATARAX) 25 MG tablet TAKE 1 TABLET BY MOUTH NIGHTLY AS NEEDED  (INSOMNIA). 30 tablet 1    [DISCONTINUED] hydrOXYzine HCL (ATARAX) 25 MG tablet TAKE 1 TABLET BY MOUTH NIGHTLY AS NEEDED (INSOMNIA). 90 tablet 0    [DISCONTINUED] tiZANidine (ZANAFLEX) 2 MG tablet TAKE 1 TABLET BY MOUTH EVERY DAY AT NIGHT AS NEEDED 30 tablet 2     No facility-administered encounter medications on file as of 3/13/2024.       Allergies:  Review of patient's allergies indicates:  No Known Allergies    Health Maintenance:  Immunization History   Administered Date(s) Administered    COVID-19, MRNA, LN-S, PF (Pfizer) (Purple Cap) 01/17/2021, 02/07/2021    Hepatitis B (recombinant) Adjuvanted, 2 dose 06/04/2019, 07/05/2019    Influenza - Quadrivalent - PF *Preferred* (6 months and older) 12/04/2017, 12/06/2018, 11/08/2019, 11/03/2020, 09/21/2022    PPD Test 05/14/2019    Tdap 06/13/2020      Health Maintenance   Topic Date Due    Hepatitis C Screening  Never done    Shingles Vaccine (1 of 2) Never done    Mammogram  01/24/2024    DEXA Scan  01/17/2025    Lipid Panel  09/15/2028    TETANUS VACCINE  06/13/2030    Colorectal Cancer Screening  11/02/2033        Physical Exam      Vital Signs  Pulse: 85  SpO2: 98 %  BP: 110/76  BP Location: Right arm  Patient Position: Sitting  Pain Score:   8  Pain Loc: Neck  Height and Weight  Weight: 55.2 kg (121 lb 11.1 oz)]    Physical Exam  Vitals reviewed.   Constitutional:       Appearance: She is well-developed.   HENT:      Head: Normocephalic and atraumatic.      Right Ear: External ear normal.      Left Ear: External ear normal.   Cardiovascular:      Rate and Rhythm: Normal rate and regular rhythm.      Heart sounds: Normal heart sounds. No murmur heard.  Pulmonary:      Effort: Pulmonary effort is normal.      Breath sounds: Normal breath sounds. No wheezing or rales.   Abdominal:      General: Bowel sounds are normal. There is no distension.      Palpations: Abdomen is soft.      Tenderness: There is no abdominal tenderness.          Laboratory:  CBC:  Recent  "Labs   Lab 05/17/23  0753   WBC 7.96   RBC 4.11   Hemoglobin 11.4 L   Hematocrit 37.6   Platelets 317   MCV 92   MCH 27.7   MCHC 30.3 L     CMP:        Invalid input(s): "CREATININ"  URINALYSIS:       LIPIDS:  Recent Labs   Lab 08/24/22  0000 09/15/23  0000   HDL 69 64   Cholesterol 193 186   Triglycerides 100 118   LDL Calculated 116 112     TSH:      A1C:  Recent Labs   Lab 08/24/22  0000   Hemoglobin A1C 5.2       Assessment/Plan     Jayshree Bee is a 63 y.o.female with:    1. Annual physical exam  - Ambulatory referral/consult to Gynecology; Future  Discussed diet and exercise, vaccines and cancer screening, risk factors.  Screening labs reviewed.  2. History of breast cancer  - CBC Auto Differential; Future  - Ambulatory referral/consult to Gynecology; Future  Continue surveillance  3. GERD without esophagitis  - CBC Auto Differential; Future  Continue current meds.    4. Age-related osteoporosis without current pathological fracture  - DXA Bone Density Axial Skeleton 1 or more sites; Future    5. Screening mammogram, encounter for  - Mammo Digital Screening Bilat w/ Dani; Future    6. Hyperlipidemia, mixed  - Comprehensive Metabolic Panel; Future  - Lipid Panel; Future    7. Primary insomnia  - tiZANidine (ZANAFLEX) 2 MG tablet; TAKE 1 TABLET BY MOUTH EVERY DAY AT NIGHT AS NEEDED  Dispense: 30 tablet; Refill: 2       Chronic conditions status updated as per HPI.  Other than changes above, cont current medications and maintain follow up with specialists.  Follow up in about 1 year (around 3/13/2025) for Annual preventative visit.    Future Appointments   Date Time Provider Department Center   8/22/2024 10:00 AM [facility] ROXANNE Miranda MD  [facility]                  "

## 2024-03-14 ENCOUNTER — LAB VISIT (OUTPATIENT)
Dept: LAB | Facility: HOSPITAL | Age: 64
End: 2024-03-14
Attending: INTERNAL MEDICINE
Payer: COMMERCIAL

## 2024-03-14 DIAGNOSIS — E78.2 HYPERLIPIDEMIA, MIXED: ICD-10-CM

## 2024-03-14 DIAGNOSIS — Z85.3 HISTORY OF BREAST CANCER: ICD-10-CM

## 2024-03-14 DIAGNOSIS — K21.9 GERD WITHOUT ESOPHAGITIS: ICD-10-CM

## 2024-03-14 LAB
ALBUMIN SERPL BCP-MCNC: 4 G/DL (ref 3.5–5.2)
ALP SERPL-CCNC: 64 U/L (ref 55–135)
ALT SERPL W/O P-5'-P-CCNC: 21 U/L (ref 10–44)
ANION GAP SERPL CALC-SCNC: 9 MMOL/L (ref 8–16)
AST SERPL-CCNC: 21 U/L (ref 10–40)
BASOPHILS # BLD AUTO: 0.07 K/UL (ref 0–0.2)
BASOPHILS NFR BLD: 1.5 % (ref 0–1.9)
BILIRUB SERPL-MCNC: 0.4 MG/DL (ref 0.1–1)
BUN SERPL-MCNC: 11 MG/DL (ref 8–23)
CALCIUM SERPL-MCNC: 9.2 MG/DL (ref 8.7–10.5)
CHLORIDE SERPL-SCNC: 108 MMOL/L (ref 95–110)
CHOLEST SERPL-MCNC: 184 MG/DL (ref 120–199)
CHOLEST/HDLC SERPL: 3.3 {RATIO} (ref 2–5)
CO2 SERPL-SCNC: 25 MMOL/L (ref 23–29)
CREAT SERPL-MCNC: 0.8 MG/DL (ref 0.5–1.4)
DIFFERENTIAL METHOD BLD: ABNORMAL
EOSINOPHIL # BLD AUTO: 0.1 K/UL (ref 0–0.5)
EOSINOPHIL NFR BLD: 1.9 % (ref 0–8)
ERYTHROCYTE [DISTWIDTH] IN BLOOD BY AUTOMATED COUNT: 14.2 % (ref 11.5–14.5)
EST. GFR  (NO RACE VARIABLE): >60 ML/MIN/1.73 M^2
GLUCOSE SERPL-MCNC: 88 MG/DL (ref 70–110)
HCT VFR BLD AUTO: 39.5 % (ref 37–48.5)
HDLC SERPL-MCNC: 56 MG/DL (ref 40–75)
HDLC SERPL: 30.4 % (ref 20–50)
HGB BLD-MCNC: 12.2 G/DL (ref 12–16)
IMM GRANULOCYTES # BLD AUTO: 0.01 K/UL (ref 0–0.04)
IMM GRANULOCYTES NFR BLD AUTO: 0.2 % (ref 0–0.5)
LDLC SERPL CALC-MCNC: 104 MG/DL (ref 63–159)
LYMPHOCYTES # BLD AUTO: 2 K/UL (ref 1–4.8)
LYMPHOCYTES NFR BLD: 43.2 % (ref 18–48)
MCH RBC QN AUTO: 28.2 PG (ref 27–31)
MCHC RBC AUTO-ENTMCNC: 30.9 G/DL (ref 32–36)
MCV RBC AUTO: 91 FL (ref 82–98)
MONOCYTES # BLD AUTO: 0.4 K/UL (ref 0.3–1)
MONOCYTES NFR BLD: 8.2 % (ref 4–15)
NEUTROPHILS # BLD AUTO: 2.1 K/UL (ref 1.8–7.7)
NEUTROPHILS NFR BLD: 45 % (ref 38–73)
NONHDLC SERPL-MCNC: 128 MG/DL
NRBC BLD-RTO: 0 /100 WBC
PLATELET # BLD AUTO: 308 K/UL (ref 150–450)
PMV BLD AUTO: 9.8 FL (ref 9.2–12.9)
POTASSIUM SERPL-SCNC: 4.3 MMOL/L (ref 3.5–5.1)
PROT SERPL-MCNC: 7 G/DL (ref 6–8.4)
RBC # BLD AUTO: 4.32 M/UL (ref 4–5.4)
SODIUM SERPL-SCNC: 142 MMOL/L (ref 136–145)
TRIGL SERPL-MCNC: 120 MG/DL (ref 30–150)
WBC # BLD AUTO: 4.65 K/UL (ref 3.9–12.7)

## 2024-03-14 PROCEDURE — 85025 COMPLETE CBC W/AUTO DIFF WBC: CPT | Performed by: INTERNAL MEDICINE

## 2024-03-14 PROCEDURE — 36415 COLL VENOUS BLD VENIPUNCTURE: CPT | Performed by: INTERNAL MEDICINE

## 2024-03-14 PROCEDURE — 80053 COMPREHEN METABOLIC PANEL: CPT | Performed by: INTERNAL MEDICINE

## 2024-03-14 PROCEDURE — 80061 LIPID PANEL: CPT | Performed by: INTERNAL MEDICINE

## 2024-04-30 ENCOUNTER — TELEPHONE (OUTPATIENT)
Dept: ORTHOPEDICS | Facility: CLINIC | Age: 64
End: 2024-04-30
Payer: COMMERCIAL

## 2024-05-07 DIAGNOSIS — M79.642 BILATERAL HAND PAIN: Primary | ICD-10-CM

## 2024-05-07 DIAGNOSIS — M79.641 BILATERAL HAND PAIN: Primary | ICD-10-CM

## 2024-05-15 ENCOUNTER — HOSPITAL ENCOUNTER (OUTPATIENT)
Dept: RADIOLOGY | Facility: OTHER | Age: 64
Discharge: HOME | End: 2024-05-15
Attending: ORTHOPAEDIC SURGERY
Payer: COMMERCIAL

## 2024-05-15 ENCOUNTER — OFFICE VISIT (OUTPATIENT)
Dept: ORTHOPEDICS | Facility: CLINIC | Age: 64
End: 2024-05-15
Payer: COMMERCIAL

## 2024-05-15 ENCOUNTER — PATIENT MESSAGE (OUTPATIENT)
Dept: ORTHOPEDICS | Facility: CLINIC | Age: 64
End: 2024-05-15

## 2024-05-15 DIAGNOSIS — R22.31 FINGER MASS, RIGHT: ICD-10-CM

## 2024-05-15 DIAGNOSIS — M65.331 TRIGGER MIDDLE FINGER OF RIGHT HAND: Primary | ICD-10-CM

## 2024-05-15 DIAGNOSIS — M79.642 BILATERAL HAND PAIN: ICD-10-CM

## 2024-05-15 DIAGNOSIS — M67.441 GANGLION CYST OF FLEXOR TENDON SHEATH OF FINGER OF RIGHT HAND: ICD-10-CM

## 2024-05-15 DIAGNOSIS — M18.12 PRIMARY OSTEOARTHRITIS OF FIRST CARPOMETACARPAL JOINT OF LEFT HAND: ICD-10-CM

## 2024-05-15 DIAGNOSIS — M79.641 BILATERAL HAND PAIN: ICD-10-CM

## 2024-05-15 DIAGNOSIS — G56.01 RIGHT CARPAL TUNNEL SYNDROME: ICD-10-CM

## 2024-05-15 PROCEDURE — 1159F MED LIST DOCD IN RCRD: CPT | Mod: CPTII,S$GLB,, | Performed by: ORTHOPAEDIC SURGERY

## 2024-05-15 PROCEDURE — 73130 X-RAY EXAM OF HAND: CPT | Mod: TC,50,FY

## 2024-05-15 PROCEDURE — 1160F RVW MEDS BY RX/DR IN RCRD: CPT | Mod: CPTII,S$GLB,, | Performed by: ORTHOPAEDIC SURGERY

## 2024-05-15 PROCEDURE — 1101F PT FALLS ASSESS-DOCD LE1/YR: CPT | Mod: CPTII,S$GLB,, | Performed by: ORTHOPAEDIC SURGERY

## 2024-05-15 PROCEDURE — 99214 OFFICE O/P EST MOD 30 MIN: CPT | Mod: 25,S$GLB,, | Performed by: ORTHOPAEDIC SURGERY

## 2024-05-15 PROCEDURE — 20550 NJX 1 TENDON SHEATH/LIGAMENT: CPT | Mod: RT,S$GLB,, | Performed by: ORTHOPAEDIC SURGERY

## 2024-05-15 PROCEDURE — 20600 DRAIN/INJ JOINT/BURSA W/O US: CPT | Mod: 51,XS,LT,S$GLB | Performed by: ORTHOPAEDIC SURGERY

## 2024-05-15 PROCEDURE — 99999 PR PBB SHADOW E&M-EST. PATIENT-LVL III: CPT | Mod: PBBFAC,,, | Performed by: ORTHOPAEDIC SURGERY

## 2024-05-15 PROCEDURE — 73130 X-RAY EXAM OF HAND: CPT | Mod: 26,,, | Performed by: RADIOLOGY

## 2024-05-15 PROCEDURE — 1125F AMNT PAIN NOTED PAIN PRSNT: CPT | Mod: CPTII,S$GLB,, | Performed by: ORTHOPAEDIC SURGERY

## 2024-05-15 PROCEDURE — 3288F FALL RISK ASSESSMENT DOCD: CPT | Mod: CPTII,S$GLB,, | Performed by: ORTHOPAEDIC SURGERY

## 2024-05-15 RX ADMIN — METHYLPREDNISOLONE ACETATE 40 MG: 40 INJECTION, SUSPENSION INTRA-ARTICULAR; INTRALESIONAL; INTRAMUSCULAR; SOFT TISSUE at 02:05

## 2024-05-15 NOTE — PROCEDURES
Tendon Sheath    Date/Time: 5/15/2024 2:15 PM    Performed by: Halie Mata MD  Authorized by: Halie Mata MD    Consent Done?:  Yes (Verbal)  Indications:  Pain  Timeout: prior to procedure the correct patient, procedure, and site was verified    Prep: patient was prepped and draped in usual sterile fashion      Local anesthesia used?: Yes    Anesthesia:  Local infiltration  Local anesthetic:  Lidocaine 1% without epinephrine  Anesthetic total (ml):  1    Location:  Long finger  Site:  R long flexor tendon sheath  Ultrasonic guidance for needle placement?: No    Needle size:  25 G  Approach:  Volar  Medications:  40 mg methylPREDNISolone acetate 40 mg/mL  Patient tolerance:  Patient tolerated the procedure well with no immediate complications  Small Joint Aspiration/Injection: L thumb CMC    Date/Time: 5/15/2024 2:15 PM    Performed by: Halie Mata MD  Authorized by: Halie Mata MD    Consent Done?:  Yes (Verbal)  Indications:  Pain  Timeout: prior to procedure the correct patient, procedure, and site was verified    Prep: patient was prepped and draped in usual sterile fashion      Local anesthesia used?: Yes    Anesthesia:  Local infiltration  Local anesthetic:  Lidocaine 1% without epinephrine  Location:  Thumb  Site:  L thumb CMC  Needle size:  25 G  Medications:  40 mg methylPREDNISolone acetate 40 mg/mL  Patient tolerance:  Patient tolerated the procedure well with no immediate complications

## 2024-05-15 NOTE — PROGRESS NOTES
I saw Jayshree Bee presents for follow up evaluation of   Encounter Diagnoses   Name Primary?    Primary osteoarthritis of first carpometacarpal joint of left hand     Trigger middle finger of right hand Yes    Ganglion cyst of flexor tendon sheath of finger of right hand     Finger mass, right      History of Present Illness   Patient had steroid injection right long finger 100% Improvement for about 1 year and left thumb cmc joint 100% Improvement for 1 year as well. She continues to have right hand numbness, temperature change, dropping objects and decreased  strength. Her RLF is locking if she picks up something heavy and she has to manually pull into extension.     The patient experienced a fall in 08/2023, necessitating a rhizotomy of C3-C4. Post-rhizotomy, her cervical lump pain has subsided, and her neck pain has significantly improved, attributing the pains to her trapezius muscles. However, she reports persistent triggering in her fingers, which commenced in either 10/2023 or 09/2023. Initially, the triggering was not severe, but it has since become increasingly bothersome, necessitating a joint straightening. She experiences significant pain in her palm, a palpable cyst, and numbness in her fingers, predominantly on the right side.     A physical examination conducted by Dr. Pope, a pain specialist, suggested the possibility of carpal tunnel syndrome, the cause of her numbness, rather than her neck. She has one splint for her right hand, but has not yet acquired one for her left hand. An x-ray report revealed subchondral sclerosis in both hands, with the left side being more affected than the right. She expresses a desire to receive an injection today. She sustained a whiplash injury after landing on a tractor, which she believes may have contributed to her current symptoms. She owns a right-handed thumb brace.     Her mother has had carpal tunnel in both hands. Her daughter has been diagnosed  with dermatomyositis.    Patient fell 8. 2023 - Sees PMG[facility] at [facility] -  RFA C3/C4 04.2024  Vitals:    05/15/24 1438   PainSc:   7   PainLoc: Hand       PE:    AA&O x 4.  NAD  HEENT:  NCAT, sclera nonicteric  Lungs:  Respirations are equal and unlabored.  CV:  2+ bilateral upper and lower extremity pulses.  MSK:   Physical Exam      5 x 5 mm smooth compressible and well-circumscribed mass dorsum of right long finger middle phalanx, ganglion of tendon sheath right long finger volar A1 pulley, tender to palpation A1 pulley demonstrable catching, tender to palpation left thumb CMC joint decreased CMC motion, + grind test left thumb, + compression, Tinels and phalens right wrist, otherwise bilateral ROM hand/wrist/elbow full. Neurovascularly intact bilaterally.  5/5 thenar and intrinsic musculature strength.    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     Assessment/Plan:   Encounter Diagnoses   Name Primary?    Primary osteoarthritis of first carpometacarpal joint of left hand     Trigger middle finger of right hand Yes    Ganglion cyst of flexor tendon sheath of finger of right hand     Finger mass, right      Assessment & Plan  We have discussed the natural history of carpal tunnel, trigger finger and basilar thumb arthritis including treatment options such as splinting, oral and topical anti-inflammatories, cortisone injections and surgery.  I have ordered a nerve conduction study.      -I have offered her a selective injection. I have explained the risks, benefits, and alternatives of the procedure in detail.  The patient voices understanding and all questions have been answered. The patient agrees to proceed as planned. So after a sterile prep of the  skin in the normal fashion the left thumb CMC joint and right long flexor sheath was injected using a 25 gauge needle with a combination of 1cc 1% plain lidocaine and 40 mg of methylprednisolone.  The patient is cautioned and immediate relief of pain is secondary to the local anesthetic and will be temporary.  After the anesthetic wears off there may be a increase in pain that may last for a few hours or a few days and they should use ice to help alleviate this flair up of pain. Patient tolerated the procedure well.    F/U after EMG/NCS  Call with any questions/concerns in the interim     I      Halie Mata MD    Please be aware that this note has been generated with the assistance of [facility] voice-to-text.  Please excuse any spelling or grammatical errors.    This note was generated with the assistance of ambient listening technology. Verbal consent was obtained by the patient and accompanying visitor(s) for the recording of patient appointment to facilitate this note. I attest to having reviewed and edited the generated note for accuracy, though some syntax or spelling errors may persist. Please contact the author of this note for any clarification.

## 2024-05-17 ENCOUNTER — OFFICE VISIT (OUTPATIENT)
Dept: OBSTETRICS AND GYNECOLOGY | Facility: CLINIC | Age: 64
End: 2024-05-17
Payer: COMMERCIAL

## 2024-05-17 VITALS
BODY MASS INDEX: 24.23 KG/M2 | HEIGHT: 60 IN | SYSTOLIC BLOOD PRESSURE: 121 MMHG | WEIGHT: 123.44 LBS | DIASTOLIC BLOOD PRESSURE: 81 MMHG

## 2024-05-17 DIAGNOSIS — Z01.419 ENCOUNTER FOR GYNECOLOGICAL EXAMINATION (GENERAL) (ROUTINE) WITHOUT ABNORMAL FINDINGS: ICD-10-CM

## 2024-05-17 DIAGNOSIS — N39.0 FREQUENT UTI: ICD-10-CM

## 2024-05-17 DIAGNOSIS — N94.12 DEEP DYSPAREUNIA: Primary | ICD-10-CM

## 2024-05-17 DIAGNOSIS — Z85.3 HISTORY OF BREAST CANCER: ICD-10-CM

## 2024-05-17 DIAGNOSIS — B00.1 COLD SORE: ICD-10-CM

## 2024-05-17 DIAGNOSIS — Z00.00 ANNUAL PHYSICAL EXAM: ICD-10-CM

## 2024-05-17 PROCEDURE — 3079F DIAST BP 80-89 MM HG: CPT | Mod: CPTII,S$GLB,, | Performed by: OBSTETRICS & GYNECOLOGY

## 2024-05-17 PROCEDURE — 3288F FALL RISK ASSESSMENT DOCD: CPT | Mod: CPTII,S$GLB,, | Performed by: OBSTETRICS & GYNECOLOGY

## 2024-05-17 PROCEDURE — 1126F AMNT PAIN NOTED NONE PRSNT: CPT | Mod: CPTII,S$GLB,, | Performed by: OBSTETRICS & GYNECOLOGY

## 2024-05-17 PROCEDURE — 99999 PR PBB SHADOW E&M-EST. PATIENT-LVL III: CPT | Mod: PBBFAC,,, | Performed by: OBSTETRICS & GYNECOLOGY

## 2024-05-17 PROCEDURE — 1100F PTFALLS ASSESS-DOCD GE2>/YR: CPT | Mod: CPTII,S$GLB,, | Performed by: OBSTETRICS & GYNECOLOGY

## 2024-05-17 PROCEDURE — 1159F MED LIST DOCD IN RCRD: CPT | Mod: CPTII,S$GLB,, | Performed by: OBSTETRICS & GYNECOLOGY

## 2024-05-17 PROCEDURE — 3074F SYST BP LT 130 MM HG: CPT | Mod: CPTII,S$GLB,, | Performed by: OBSTETRICS & GYNECOLOGY

## 2024-05-17 PROCEDURE — 99397 PER PM REEVAL EST PAT 65+ YR: CPT | Mod: S$GLB,,, | Performed by: OBSTETRICS & GYNECOLOGY

## 2024-05-17 RX ORDER — DENOSUMAB 60 MG/ML
60 INJECTION SUBCUTANEOUS
COMMUNITY

## 2024-05-17 RX ORDER — PRASTERONE 6.5 MG/1
1 INSERT VAGINAL NIGHTLY
Qty: 28 EACH | Refills: 11 | Status: SHIPPED | OUTPATIENT
Start: 2024-05-17

## 2024-05-17 RX ORDER — NITROFURANTOIN (MACROCRYSTALS) 100 MG/1
100 CAPSULE ORAL NIGHTLY
Qty: 30 CAPSULE | Refills: 5 | Status: SHIPPED | OUTPATIENT
Start: 2024-05-17

## 2024-05-17 RX ORDER — PANTOPRAZOLE SODIUM 40 MG/1
1 TABLET, DELAYED RELEASE ORAL DAILY
COMMUNITY

## 2024-05-17 RX ORDER — VALACYCLOVIR HYDROCHLORIDE 500 MG/1
500 TABLET, FILM COATED ORAL 2 TIMES DAILY
Qty: 60 TABLET | Refills: 11 | Status: SHIPPED | OUTPATIENT
Start: 2024-05-17 | End: 2025-05-17

## 2024-05-17 NOTE — PROGRESS NOTES
Subjective:       Patient ID: Jayshree Bee is a 63 y.o. female.    Chief Complaint:  Annual Exam (/Contraception: The current method of family planning is/ hysterectomy ./Meds per MD: /Last Pap:  HPV: /Last MM24  Birads: 2  Facility: [facility] /Last Colonoscopy: 2024   GI:divertuosis/Last Bone Density: 23 osteopina/oisois  )        History of Present Illness  Jayshree Bee is a 63 y.o. female  who presents for new patient annual. Personal h/o breast cancer, s/p surgery and has silicone implants. She uses macrodantin prn intercourse which helps with UTI. Also requesting Valtrex for fever blisters. Reports pain with intercourse, dryness. Sometimes has tears and bleeding after sex. Used Estring for a long time in the past and did not help that much. Personal h/o breast cancer. I recommend try Intrarosa. Discussed in detail. All questions answered.     No LMP recorded. Patient has had a hysterectomy.   Date of Last Pap: No result found    Review of Systems  Review of Systems   Constitutional:  Negative for chills and fever.   Gastrointestinal:  Negative for abdominal pain, constipation, diarrhea, nausea and vomiting.   Genitourinary:  Negative for dysuria, flank pain, frequency, hematuria, pelvic pain, urgency and vaginal discharge.   Musculoskeletal:  Negative for back pain.   Skin:  Negative for rash.   Neurological:  Negative for headaches.        Objective:   Physical Exam:   Constitutional: She is oriented to person, place, and time. Vital signs are normal. She appears well-developed and well-nourished. No distress.        Pulmonary/Chest: She exhibits no mass. Right breast exhibits augmentation. Right breast exhibits no mass, no nipple discharge, no skin change, no tenderness, no bleeding and no swelling. Left breast exhibits augmentation. Left breast exhibits no mass, no nipple discharge, no skin change, no tenderness, no bleeding and no swelling. Breasts are symmetrical.        Abdominal:  Soft. Bowel sounds are normal. She exhibits no distension and no mass. There is no abdominal tenderness. There is no rebound.     Genitourinary:    Vagina normal.   There is no rash, tenderness, lesion or injury on the right labia. There is no rash, tenderness, lesion or injury on the left labia. Right adnexum displays no mass, no tenderness and no fullness. Left adnexum displays no mass, no tenderness and no fullness. No erythema, vaginal discharge, tenderness, rectocele, cystocele or prolapse of vaginal walls in the vagina. Cervix is absent.Uterus is absent.           Musculoskeletal: Normal range of motion and moves all extremeties.      Lymphadenopathy:        Right: No supraclavicular adenopathy present.        Left: No supraclavicular adenopathy present.    Neurological: She is alert and oriented to person, place, and time.    Skin: Skin is warm and dry.    Psychiatric: She has a normal mood and affect. Her behavior is normal. Judgment normal.        Assessment/ Plan:     1. Deep dyspareunia  prasterone, dhea, (INTRAROSA) 6.5 mg Inst      2. History of breast cancer  Ambulatory referral/consult to Gynecology    prasterone, dhea, (INTRAROSA) 6.5 mg Inst      3. Annual physical exam  Ambulatory referral/consult to Gynecology      4. Frequent UTI  nitrofurantoin (MACRODANTIN) 100 MG capsule      5. Cold sore  valACYclovir (VALTREX) 500 MG tablet          No follow-ups on file.    As of April 1, 2021, the Cures Act has been passed nationally. This new law requires that all doctors progress notes, lab results, pathology reports and radiology reports be released IMMEDIATELY to the patient in the patient portal. That means that the results are released to you at the EXACT same time they are released to me. Therefore, with all of the patients that I have I am not able to reply to each patient exactly when the results come in. So there will be a delay from when you see the results to when I see them and have time to  come up with a response to send you. Also I only see these results when I am on the computer at work. So if the results come in over the weekend or after 5 pm of a work day, I will not see them until the next business day. As you can tell, this is a challenge as a physician to give every patient the quick response they hope for and deserve. So please be patient! Thanks for understanding, Dr. Ruiz    Answers submitted by the patient for this visit:  Gynecologic Exam Questionnaire  (Submitted on 2024)  Chief Complaint: Gynecologic exam  genital itching: No  genital lesions: No  genital odor: No  genital rash: No  missed menses: No  vaginal bleeding: No  Chronicity: chronic  Onset: more than 1 year ago  Frequency: intermittently  Progression since onset: gradually worsening  Pain severity: severe  Affected side: right  Pregnant now?: No  anorexia: No  discolored urine: No  painful intercourse: Yes  Please select the characteristics of your discharge: : normal  Vaginal bleeding: no bleeding  Passing clots?: No  Passing tissue?: No  Aggravated by: intercourse  treatments tried: warm bath  Improvement on treatment: no relief  Sexual activity: sexually active  Partner with STD symptoms: unknown  Birth control: hysterectomy  Menstrual history: postmenopausal  STD: No  abdominal surgery: Yes   section: Yes  Ectopic pregnancy: No  Endometriosis: No  herpes simplex: No  gynecological surgery: Yes  menorrhagia: No  metrorrhagia: No  miscarriage: Yes  ovarian cysts: No  perineal abscess: No  PID: No  terminated pregnancy: No  vaginosis: No

## 2024-05-30 DIAGNOSIS — F51.01 PRIMARY INSOMNIA: ICD-10-CM

## 2024-05-30 RX ORDER — TIZANIDINE 2 MG/1
TABLET ORAL
Qty: 30 TABLET | Refills: 0 | Status: SHIPPED | OUTPATIENT
Start: 2024-05-30

## 2024-05-30 NOTE — TELEPHONE ENCOUNTER
No care due was identified.  [facility] Embedded Care Due Messages. Reference number: [id number].   5/30/2024 3:50:04 AM CDT

## 2024-06-16 RX ORDER — METHYLPREDNISOLONE ACETATE 40 MG/ML
40 INJECTION, SUSPENSION INTRA-ARTICULAR; INTRALESIONAL; INTRAMUSCULAR; SOFT TISSUE
Status: DISCONTINUED | OUTPATIENT
Start: 2024-05-15 | End: 2024-06-16 | Stop reason: HOSPADM

## 2024-07-10 DIAGNOSIS — F51.01 PRIMARY INSOMNIA: ICD-10-CM

## 2024-07-10 RX ORDER — TIZANIDINE 2 MG/1
TABLET ORAL
Qty: 30 TABLET | Refills: 0 | Status: SHIPPED | OUTPATIENT
Start: 2024-07-10

## 2024-07-10 NOTE — TELEPHONE ENCOUNTER
No care due was identified.  [facility] Embedded Care Due Messages. Reference number: [id number].   7/10/2024 11:24:06 AM CDT

## 2024-07-16 ENCOUNTER — PROCEDURE VISIT (OUTPATIENT)
Dept: NEUROLOGY | Facility: CLINIC | Age: 64
End: 2024-07-16
Payer: COMMERCIAL

## 2024-07-16 DIAGNOSIS — G56.01 RIGHT CARPAL TUNNEL SYNDROME: ICD-10-CM

## 2024-07-16 PROCEDURE — 95911 NRV CNDJ TEST 9-10 STUDIES: CPT | Mod: S$GLB,,, | Performed by: PHYSICAL MEDICINE & REHABILITATION

## 2024-07-16 PROCEDURE — 95886 MUSC TEST DONE W/N TEST COMP: CPT | Mod: S$GLB,,, | Performed by: PHYSICAL MEDICINE & REHABILITATION

## 2024-07-16 NOTE — PROCEDURES
Test Date:  2024    Patient: dedrick bee : 1960 Physician: Sree Carrero D.O.   ID#: [id number] SEX: Female Ref. Phys: Halie Mata MD     HPI: Dedrick Bee is a 63 y.o.female who presents for NCS/EMG to evaluate for right CTS.     PROCEDURE:  Prior to the procedure, the procedure was discussed in detail with the patient.  All questions were answered, and verbal consent was obtained.  For nerve conduction studies, a combination of surface electrodes, bar electrodes, and/or ring electrodes were used as needed.  For needle EMG, each site was cleaned and prepped in usual fashion with an alcohol pad.  A monopolar needle (28G) was used.  There was no significant bleeding, and bandages were applied as needed.  The procedure was tolerated without adverse effect.  The patient was instructed on post-procedure care including ice if needed for 10-15 minutes up to 4 times/day for any sore muscles.  I discussed with the patient that the data would be reviewed and a report sent to the referring provider, where any follow up questions regarding next steps should be directed.        NCV & EMG Findings:  Evaluation of the right Median-Radial (Dig I) sensory nerve showed abnormal peak latency difference ((Median Wrist-Dig I)-(Radial Wrist)).  The right Median-Ulnar Palmar sensory nerve showed abnormal peak latency difference ((Median Palm-Wrist)-(Ulnar Palm)).  All remaining nerves (as indicated in the following tables) were within normal limits.  Needle evaluation of the right Abductor Pollicis Brevis muscle showed moderately increased polyphasic potentials.  All remaining muscles (as indicated in the following table) showed no evidence of electrical instability.      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 Carpal tunnel syndrome).  There is no motor axonal loss.  There is no active denervation.  This is graded as Mild  in severity on the right.          ___________________________  Sree Carrero D.O.        NCS+  Motor Nerve Results      Latency Amplitude F-Lat Segment Distance CV Comment   Site (ms) Norm (mV) Norm (ms)  (cm) (m/s) Norm    Left Median (APB)   Wrist 3.2  < 4.4 6.4  > 3.8         Elbow 7.0 - 6.3 -  Elbow-Wrist 22 58  > 51    Right Median (APB)   Wrist 3.7  < 4.4 8.4  > 3.8         Elbow 7.5 - 8.2 -  Elbow-Wrist 20 53  > 51    Left Ulnar (ADM)   Wrist 2.3  < 3.7 10.6  > 3.0         Bel Elbow 5.7 - 9.6 -  Bel Elbow-Wrist 22 65  > 52    Abv Elbow 6.8 - 9.3 -  Abv Elbow-Bel Elbow 7 64  > 43    Right Ulnar (ADM)   Wrist 2.5  < 3.7 11.1  > 3.0         Bel Elbow 5.9 - 8.9 -  Bel Elbow-Wrist 24 71  > 52    Abv Elbow 7.5 - 8.4 -  Abv Elbow-Bel Elbow 10 63  > 43      Sensory Nerve Results      Start Lat Latency (Peak) Amplitude (P-P) Segment Distance Start CV Comment   Site (ms) Norm (ms) (µV) Norm  (cm) (m/s) Norm    Left Median (Rec:Dig II)   Wrist 2.4  < 3.3 3.1 41 - Wrist-Dig II 14 58  > 42    Right Median   Wrist-Dig I 2.5 - 3.5 23 - Wrist-Dig I 10 40 -    Wrist-Dig II 2.8  < 3.3 3.6 32 - Wrist-Dig II 14 50  > 42    Palm-Wrist 1.53 - 2.0 84 - Palm-Wrist - - -    Left Ulnar (Rec:Dig V)   Wrist 2.0  < 3.1 2.7 42 - Wrist-Dig V 14 70  > 45    Right Ulnar (Rec:Wrist)   Palm 1.20 - 1.68 41 - Palm-Wrist - - -    Right Radial (Rec:Dig I)   Wrist 1.90 - 2.4 9 - Wrist-Dig I 10 53 -      Inter-Nerve Comparisons     Nerve 1 Value 1 Nerve 2 Value 2 Parameter Result Normal   Sensory Sites   R Median Wrist-Dig I 3.5 ms R Radial Wrist-Dig I 2.4 ms Peak Lat Diff *1.10 ms <0.40   R Median Palm-Wrist 2.0 ms R Ulnar Palm-Wrist 1.68 ms Peak Lat Diff *0.32 ms <0.30     EMG+     Side Muscle Nerve Root Ins Act Fibs Psw Amp Dur Poly Recrt Int Pat Comment   Right Deltoid Axillary C5-C6 Nml Nml Nml Nml Nml 0 Nml Nml    Right Biceps Musculocut C5-C6 Nml Nml Nml Nml Nml 0 Nml Nml    Right Triceps Radial C6-C8 Nml Nml Nml Nml Nml 0 Nml Nml     Right Pronator Teres Median C6-C7 Nml Nml Nml Nml Nml 0 Nml Nml    Right APB Median C8-T1 Nml Nml Nml Nml Nml *2+ Nml Nml            Waveforms:    Motor              Sensory

## 2024-07-17 ENCOUNTER — OFFICE VISIT (OUTPATIENT)
Dept: ORTHOPEDICS | Facility: CLINIC | Age: 64
End: 2024-07-17
Payer: COMMERCIAL

## 2024-07-17 DIAGNOSIS — M65.331 TRIGGER MIDDLE FINGER OF RIGHT HAND: ICD-10-CM

## 2024-07-17 DIAGNOSIS — M79.641 CHRONIC PAIN OF RIGHT HAND: ICD-10-CM

## 2024-07-17 DIAGNOSIS — G56.01 RIGHT CARPAL TUNNEL SYNDROME: Primary | ICD-10-CM

## 2024-07-17 DIAGNOSIS — M18.12 PRIMARY OSTEOARTHRITIS OF FIRST CARPOMETACARPAL JOINT OF LEFT HAND: ICD-10-CM

## 2024-07-17 DIAGNOSIS — G89.29 CHRONIC PAIN OF RIGHT HAND: ICD-10-CM

## 2024-07-17 DIAGNOSIS — M67.441 GANGLION CYST OF FLEXOR TENDON SHEATH OF FINGER OF RIGHT HAND: ICD-10-CM

## 2024-07-17 PROCEDURE — 3288F FALL RISK ASSESSMENT DOCD: CPT | Mod: CPTII,S$GLB,, | Performed by: ORTHOPAEDIC SURGERY

## 2024-07-17 PROCEDURE — 99214 OFFICE O/P EST MOD 30 MIN: CPT | Mod: 25,S$GLB,, | Performed by: ORTHOPAEDIC SURGERY

## 2024-07-17 PROCEDURE — 20526 THER INJECTION CARP TUNNEL: CPT | Mod: RT,S$GLB,, | Performed by: ORTHOPAEDIC SURGERY

## 2024-07-17 PROCEDURE — 1160F RVW MEDS BY RX/DR IN RCRD: CPT | Mod: CPTII,S$GLB,, | Performed by: ORTHOPAEDIC SURGERY

## 2024-07-17 PROCEDURE — 1101F PT FALLS ASSESS-DOCD LE1/YR: CPT | Mod: CPTII,S$GLB,, | Performed by: ORTHOPAEDIC SURGERY

## 2024-07-17 PROCEDURE — 1126F AMNT PAIN NOTED NONE PRSNT: CPT | Mod: CPTII,S$GLB,, | Performed by: ORTHOPAEDIC SURGERY

## 2024-07-17 PROCEDURE — 99999 PR PBB SHADOW E&M-EST. PATIENT-LVL III: CPT | Mod: PBBFAC,,, | Performed by: ORTHOPAEDIC SURGERY

## 2024-07-17 PROCEDURE — 1159F MED LIST DOCD IN RCRD: CPT | Mod: CPTII,S$GLB,, | Performed by: ORTHOPAEDIC SURGERY

## 2024-07-17 RX ADMIN — METHYLPREDNISOLONE ACETATE 40 MG: 40 INJECTION, SUSPENSION INTRA-ARTICULAR; INTRALESIONAL; INTRAMUSCULAR; SOFT TISSUE at 10:07

## 2024-07-17 NOTE — PROGRESS NOTES
I saw Jayshree Bee presents for follow up evaluation of   Encounter Diagnoses   Name Primary?    Right carpal tunnel syndrome Yes    Trigger middle finger of right hand     Ganglion cyst of flexor tendon sheath of finger of right hand     Primary osteoarthritis of first carpometacarpal joint of left hand     Chronic pain of right hand      History of Present Illness   Patient had steroid injection right long finger 100% Improvement for about 1 year and left thumb cmc joint 100% Improvement for 1 year as well. She continues to have right hand numbness, temperature change, dropping objects and decreased  strength. Her right long finger is locking if she picks up something heavy and she has to manually pull into extension.     The patient experienced a fall in 08/2023, necessitating a rhizotomy of C3-C4. Post-rhizotomy, her cervical lump pain has subsided, and her neck pain has significantly improved, attributing the pains to her trapezius muscles. However, she reports persistent triggering in her fingers, which commenced in either 10/2023 or 09/2023. Initially, the triggering was not severe, but it has since become increasingly bothersome, necessitating a joint straightening. She experiences significant pain in her palm, a palpable cyst, and numbness in her fingers, predominantly on the right side.     A physical examination conducted by Dr. Pope, a pain specialist, suggested the possibility of carpal tunnel syndrome, the cause of her numbness, rather than her neck. She has one splint for her right hand, but has not yet acquired one for her left hand. An x-ray report revealed subchondral sclerosis in both hands, with the left side being more affected than the right. She expresses a desire to receive an injection today. She sustained a whiplash injury after landing on a tractor, which she believes may have contributed to her current symptoms. She owns a right-handed thumb brace.     Her mother has had carpal  tunnel in both hands. Her daughter has been diagnosed with dermatomyositis.    Patient fell 8. 2023 - Sees PMT [facility] -  RFA C3/C4 04.2024  Vitals:    07/17/24 1023   PainSc: 0-No pain   PainLoc: Hand       PE:    AA&O x 4.  NAD  HEENT:  NCAT, sclera nonicteric  Lungs:  Respirations are equal and unlabored.  CV:  2+ bilateral upper and lower extremity pulses.  MSK:   Physical Exam      5 x 5 mm smooth compressible and well-circumscribed mass dorsum of right long finger middle phalanx, ganglion of tendon sheath right long finger volar A1 pulley, tender to palpation A1 pulley demonstrable catching, tender to palpation left thumb CMC joint decreased CMC motion, + grind test left thumb, + compression, Tinels and phalens right wrist, otherwise bilateral ROM hand/wrist/elbow full. Neurovascularly intact bilaterally.  5/5 thenar and intrinsic musculature strength.    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     Assessment/Plan:   Encounter Diagnoses   Name Primary?    Right carpal tunnel syndrome Yes    Trigger middle finger of right hand     Ganglion cyst of flexor tendon sheath of finger of right hand     Primary osteoarthritis of first carpometacarpal joint of left hand     Chronic pain of right hand      Assessment & Plan  We have discussed the natural history of carpal tunnel, trigger finger and basilar thumb arthritis including treatment options such as splinting, oral and topical anti-inflammatories, cortisone injections and surgery.  I have ordered a nerve conduction study.      -I have offered her a selective injection. I have explained the risks, benefits, and alternatives of the procedure in detail.  The patient voices understanding and all  questions have been answered. The patient agrees to proceed as planned. So after a sterile prep of the skin in the normal fashion the right carpal tunnel was injected using a 25 gauge needle with a combination of 1cc 1% plain lidocaine and 40 mg of methylprednisolone.  The patient is cautioned and immediate relief of pain is secondary to the local anesthetic and will be temporary.  After the anesthetic wears off there may be a increase in pain that may last for a few hours or a few days and they should use ice to help alleviate this flair up of pain. Patient tolerated the procedure well.    We have discussed conservative vs. surgical treatment as well as risks, benefits and alternatives for right carpal tunnel syndrome, right long trigger finger, right ganglion of tendon sheath.  Conservative measures have been exhausted and she would like to proceed with surgery. Surgery would include right carpal tunnel release, right long trigger finger release, right ganglion of tendon sheath excision. Consent signed today in clinic. Light use of the hand will be indicated for the first 4-6 weeks.     We have discussed risks of hand surgery which include but are not limited to blood clots in the legs that can travel to the lungs (pulmonary embolism). Pulmonary embolism can cause shortness of breath, chest pain, and even shock. Other risks include urinary tract infection, nausea and vomiting (usually related to pain medication), chronic pain, bleeding, nerve damage, blood vessel injury, scarring and infection of the hand which can require re-operation. Furthermore, the risks of anesthesia include potential heart, lung, kidney, and liver damage.  Informed consent was obtained.  The patient understands and would like to proceed with surgery in the near future.        F/U after EMG/NCS  Call with any questions/concerns in the interim     I      Halie Mata MD    Please be aware that this note has been generated with the  assistance of [employer] voice-to-text.  Please excuse any spelling or grammatical errors.    This note was generated with the assistance of ambient listening technology. Verbal consent was obtained by the patient and accompanying visitor(s) for the recording of patient appointment to facilitate this note. I attest to having reviewed and edited the generated note for accuracy, though some syntax or spelling errors may persist. Please contact the author of this note for any clarification.

## 2024-07-17 NOTE — PROCEDURES
Carpal Tunnel    Date/Time: 7/17/2024 10:15 AM    Performed by: Halie Mata MD  Authorized by: Halie Mata MD    Consent Done?:  Yes (Verbal)  Indications:  Pain  Timeout: prior to procedure the correct patient, procedure, and site was verified    Prep: patient was prepped and draped in usual sterile fashion      Local anesthesia used?: Yes    Anesthesia:  Local infiltration  Local anesthetic:  Lidocaine 1% without epinephrine  Location:  Wrist  Needle size:  25 G  Medications:  40 mg methylPREDNISolone acetate 40 mg/mL  Patient tolerance:  Patient tolerated the procedure well with no immediate complications     [de-identified] : Left forearm AP/lateral x-rays in the splint 04/13 /21: Left distal radius/ulna forearm fracture with minimal dorsal angulation however this is acceptable. Growth plates are open.  good interval healing

## 2024-08-02 ENCOUNTER — PATIENT MESSAGE (OUTPATIENT)
Dept: OBSTETRICS AND GYNECOLOGY | Facility: CLINIC | Age: 64
End: 2024-08-02
Payer: COMMERCIAL

## 2024-08-11 RX ORDER — METHYLPREDNISOLONE ACETATE 40 MG/ML
40 INJECTION, SUSPENSION INTRA-ARTICULAR; INTRALESIONAL; INTRAMUSCULAR; SOFT TISSUE
Status: DISCONTINUED | OUTPATIENT
Start: 2024-07-17 | End: 2024-08-11 | Stop reason: HOSPADM

## 2024-08-11 RX ORDER — CEFAZOLIN SODIUM 2 G/50ML
2 SOLUTION INTRAVENOUS
OUTPATIENT
Start: 2024-08-11

## 2024-08-11 RX ORDER — MUPIROCIN 20 MG/G
OINTMENT TOPICAL
OUTPATIENT
Start: 2024-08-11

## 2024-08-29 ENCOUNTER — PATIENT MESSAGE (OUTPATIENT)
Dept: ORTHOPEDICS | Facility: CLINIC | Age: 64
End: 2024-08-29
Payer: COMMERCIAL

## 2024-08-30 ENCOUNTER — TELEPHONE (OUTPATIENT)
Dept: ORTHOPEDICS | Facility: CLINIC | Age: 64
End: 2024-08-30
Payer: COMMERCIAL

## 2024-08-30 DIAGNOSIS — F51.01 PRIMARY INSOMNIA: ICD-10-CM

## 2024-08-30 RX ORDER — TIZANIDINE 2 MG/1
TABLET ORAL
Qty: 30 TABLET | Refills: 0 | Status: SHIPPED | OUTPATIENT
Start: 2024-08-30

## 2024-08-30 NOTE — TELEPHONE ENCOUNTER
Verbalized the Following:    SCHEDULED right carpal tunnel release, right long trigger finger release, right ganglion of tendon sheath excision surgery at [facility], [address] [place] [address] A DOS: 10/1/24

## 2024-08-30 NOTE — TELEPHONE ENCOUNTER
No care due was identified.  [facility] Embedded Care Due Messages. Reference number: [id number].   8/30/2024 7:57:51 AM CDT

## 2024-09-02 ENCOUNTER — PATIENT MESSAGE (OUTPATIENT)
Dept: ORTHOPEDICS | Facility: CLINIC | Age: 64
End: 2024-09-02
Payer: COMMERCIAL

## 2024-09-09 ENCOUNTER — PATIENT MESSAGE (OUTPATIENT)
Dept: ORTHOPEDICS | Facility: CLINIC | Age: 64
End: 2024-09-09
Payer: COMMERCIAL

## 2024-09-27 DIAGNOSIS — F51.01 PRIMARY INSOMNIA: ICD-10-CM

## 2024-09-27 RX ORDER — TIZANIDINE 2 MG/1
TABLET ORAL
Qty: 30 TABLET | Refills: 0 | Status: SHIPPED | OUTPATIENT
Start: 2024-09-27

## 2024-09-27 NOTE — TELEPHONE ENCOUNTER
No care due was identified.  [facility] Embedded Care Due Messages. Reference number: [id number].   9/27/2024 2:27:43 PM CDT

## 2024-09-30 ENCOUNTER — ANESTHESIA EVENT (OUTPATIENT)
Dept: SURGERY | Facility: HOSPITAL | Age: 64
End: 2024-09-30
Payer: COMMERCIAL

## 2024-10-07 ENCOUNTER — TELEPHONE (OUTPATIENT)
Dept: ORTHOPEDICS | Facility: CLINIC | Age: 64
End: 2024-10-07
Payer: COMMERCIAL

## 2024-10-07 ENCOUNTER — PATIENT MESSAGE (OUTPATIENT)
Dept: ORTHOPEDICS | Facility: CLINIC | Age: 64
End: 2024-10-07
Payer: COMMERCIAL

## 2024-10-07 DIAGNOSIS — G56.01 RIGHT CARPAL TUNNEL SYNDROME: Primary | ICD-10-CM

## 2024-10-07 RX ORDER — TRAMADOL HYDROCHLORIDE 50 MG/1
50 TABLET ORAL EVERY 6 HOURS
Qty: 25 TABLET | Refills: 0 | Status: SHIPPED | OUTPATIENT
Start: 2024-10-07

## 2024-10-07 RX ORDER — ONDANSETRON HYDROCHLORIDE 8 MG/1
8 TABLET, FILM COATED ORAL EVERY 8 HOURS PRN
Qty: 25 TABLET | Refills: 0 | Status: SHIPPED | OUTPATIENT
Start: 2024-10-07

## 2024-10-07 NOTE — TELEPHONE ENCOUNTER
Spoke to patient to inform them of their surgery arrival time (5 am), [facility], [address] [facility]:  Verbalized understanding of instructions for pre-wash, and reviewed NPO after midnight.   Confirmed call in regards to medication instructions from anesthesia.   Confirmed patient was NOT using a rideshare service for surgery arrival or  transportation.     Discussed removing any finger nail polish if applicable   N/A  Confirmed no new wounds or abrasions on operative extremity.  ant bites around legs healed.       [place] Pre-Op and PACU Visiting Policy  · Each patient will be allowed 2 visitors with 1 visitor at a time. Only 1 swap out allowed.  · Pediatric patients: Both parents are allowed if present.  · Post-Op: If there is more than 1 visitor, the person that is the main caregiver will need to be available for d/c instructions should visit 2nd, and stay with the patient until d/c.  All visitors must be accompanied in and out with an employee.    PRE-OPERATIVE MEDICATION INSTRUCTIONS:     If you are diabetic, DO NOT take any diabetic medication the night before surgery or morning of surgery. If you are type I diabetic on an insulin pump, please bring your monitor the morning of surgery.   None     MUST HOLD SEMAGLUTIDE MEDICATIONS 7 DAYS PRIOR TO SURGERY, examples: Mounjaro, Ozempic, Trulicity.   NA     If you have high blood pressure please take your medication in the morning like normal with a SIP OF WATER; with the exception of any Angiotensin receptor blocker (end in sartan) and ACE inhibitors (end in pril).  NA     If you are taking blood thinners (Aspirin, Eliquis, Lovenox, Coumadin, etc), our office will contact the prescribing physician for guidance on when you are to stop/re-start your blood thinner medication.   NA     Other medications to dose with a SIP OF WATER AM of surgery:   pantoprazole (PROTONIX) 40 MG tablet      Other Important Medication Instructions:   NA      Please HOLD Vitamins 5 days prior to surgery or sooner, CONTINUE Vitamin D up until the AM of your surgery.     Avoid anti-inflammatory medications 5 days before your surgery. This includes Aleve/Naproxen, Celebrex, Meloxicam/Mobic, Voltaren/Diclofenac.   CELEBREX 200 mg capsule hold 5 days prior  You may dose ibuprofen/Advil/Motrin up until the day before your surgery.  You may take extra strength Tylenol/Acetaminophen.     HOLD ALL OTHER MEDICATIONS AM OF SURGERY THAT ARE NOT DISCUSSED ABOVE

## 2024-10-08 ENCOUNTER — HOSPITAL ENCOUNTER (OUTPATIENT)
Facility: HOSPITAL | Age: 64
Discharge: HOME | End: 2024-10-08
Attending: ORTHOPAEDIC SURGERY | Admitting: ORTHOPAEDIC SURGERY
Payer: COMMERCIAL

## 2024-10-08 ENCOUNTER — ANESTHESIA (OUTPATIENT)
Dept: SURGERY | Facility: HOSPITAL | Age: 64
End: 2024-10-08
Payer: COMMERCIAL

## 2024-10-08 VITALS
SYSTOLIC BLOOD PRESSURE: 119 MMHG | BODY MASS INDEX: 24.15 KG/M2 | RESPIRATION RATE: 19 BRPM | TEMPERATURE: 97 F | HEART RATE: 67 BPM | WEIGHT: 123 LBS | OXYGEN SATURATION: 98 % | DIASTOLIC BLOOD PRESSURE: 78 MMHG | HEIGHT: 60 IN

## 2024-10-08 DIAGNOSIS — G56.01 RIGHT CARPAL TUNNEL SYNDROME: ICD-10-CM

## 2024-10-08 PROBLEM — M65.331 TRIGGER MIDDLE FINGER OF RIGHT HAND: Status: ACTIVE | Noted: 2024-10-08

## 2024-10-08 PROBLEM — M67.441 GANGLION CYST OF FLEXOR TENDON SHEATH OF FINGER OF RIGHT HAND: Status: ACTIVE | Noted: 2024-10-08

## 2024-10-08 PROCEDURE — 37000009 HC ANESTHESIA EA ADD 15 MINS: Performed by: ORTHOPAEDIC SURGERY

## 2024-10-08 PROCEDURE — 64721 CARPAL TUNNEL SURGERY: CPT | Mod: RT,,, | Performed by: ORTHOPAEDIC SURGERY

## 2024-10-08 PROCEDURE — 88304 TISSUE EXAM BY PATHOLOGIST: CPT | Performed by: STUDENT IN AN ORGANIZED HEALTH CARE EDUCATION/TRAINING PROGRAM

## 2024-10-08 PROCEDURE — 26160 REMOVE TENDON SHEATH LESION: CPT | Mod: 59,F7,, | Performed by: ORTHOPAEDIC SURGERY

## 2024-10-08 PROCEDURE — 63600175 PHARM REV CODE 636 W HCPCS: Performed by: ORTHOPAEDIC SURGERY

## 2024-10-08 PROCEDURE — 36000707: Performed by: ORTHOPAEDIC SURGERY

## 2024-10-08 PROCEDURE — 88307 TISSUE EXAM BY PATHOLOGIST: CPT | Performed by: STUDENT IN AN ORGANIZED HEALTH CARE EDUCATION/TRAINING PROGRAM

## 2024-10-08 PROCEDURE — 71000033 HC RECOVERY, INTIAL HOUR: Performed by: ORTHOPAEDIC SURGERY

## 2024-10-08 PROCEDURE — 36000706: Performed by: ORTHOPAEDIC SURGERY

## 2024-10-08 PROCEDURE — 25000003 PHARM REV CODE 250: Performed by: NURSE ANESTHETIST, CERTIFIED REGISTERED

## 2024-10-08 PROCEDURE — 37000008 HC ANESTHESIA 1ST 15 MINUTES: Performed by: ORTHOPAEDIC SURGERY

## 2024-10-08 PROCEDURE — 63600175 PHARM REV CODE 636 W HCPCS: Performed by: NURSE ANESTHETIST, CERTIFIED REGISTERED

## 2024-10-08 PROCEDURE — 94761 N-INVAS EAR/PLS OXIMETRY MLT: CPT

## 2024-10-08 PROCEDURE — 25000003 PHARM REV CODE 250: Performed by: ANESTHESIOLOGY

## 2024-10-08 PROCEDURE — 99900035 HC TECH TIME PER 15 MIN (STAT)

## 2024-10-08 PROCEDURE — 71000015 HC POSTOP RECOV 1ST HR: Performed by: ORTHOPAEDIC SURGERY

## 2024-10-08 PROCEDURE — 25000003 PHARM REV CODE 250: Performed by: SURGERY

## 2024-10-08 PROCEDURE — 25000003 PHARM REV CODE 250: Performed by: ORTHOPAEDIC SURGERY

## 2024-10-08 PROCEDURE — 26116 EXC HAND TUM DEEP < 1.5 CM: CPT | Mod: 51,RT,, | Performed by: ORTHOPAEDIC SURGERY

## 2024-10-08 PROCEDURE — 27201423 OPTIME MED/SURG SUP & DEVICES STERILE SUPPLY: Performed by: ORTHOPAEDIC SURGERY

## 2024-10-08 RX ORDER — GLUCAGON 1 MG
1 KIT INJECTION
Status: DISCONTINUED | OUTPATIENT
Start: 2024-10-08 | End: 2024-10-08 | Stop reason: HOSPADM

## 2024-10-08 RX ORDER — MUPIROCIN 20 MG/G
OINTMENT TOPICAL 2 TIMES DAILY
Status: DISCONTINUED | OUTPATIENT
Start: 2024-10-08 | End: 2024-10-08 | Stop reason: HOSPADM

## 2024-10-08 RX ORDER — DEXAMETHASONE SODIUM PHOSPHATE 4 MG/ML
INJECTION, SOLUTION INTRA-ARTICULAR; INTRALESIONAL; INTRAMUSCULAR; INTRAVENOUS; SOFT TISSUE
Status: DISCONTINUED | OUTPATIENT
Start: 2024-10-08 | End: 2024-10-08

## 2024-10-08 RX ORDER — PROPOFOL 10 MG/ML
VIAL (ML) INTRAVENOUS
Status: DISCONTINUED | OUTPATIENT
Start: 2024-10-08 | End: 2024-10-08

## 2024-10-08 RX ORDER — LIDOCAINE HYDROCHLORIDE 20 MG/ML
INJECTION INTRAVENOUS
Status: DISCONTINUED | OUTPATIENT
Start: 2024-10-08 | End: 2024-10-08

## 2024-10-08 RX ORDER — FENTANYL CITRATE 50 UG/ML
25 INJECTION, SOLUTION INTRAMUSCULAR; INTRAVENOUS EVERY 5 MIN PRN
Status: DISCONTINUED | OUTPATIENT
Start: 2024-10-08 | End: 2024-10-08 | Stop reason: HOSPADM

## 2024-10-08 RX ORDER — HYDROCODONE BITARTRATE AND ACETAMINOPHEN 5; 325 MG/1; MG/1
1 TABLET ORAL EVERY 4 HOURS PRN
Status: DISCONTINUED | OUTPATIENT
Start: 2024-10-08 | End: 2024-10-08 | Stop reason: HOSPADM

## 2024-10-08 RX ORDER — LIDOCAINE HYDROCHLORIDE 10 MG/ML
1 INJECTION, SOLUTION EPIDURAL; INFILTRATION; INTRACAUDAL; PERINEURAL ONCE
Status: DISCONTINUED | OUTPATIENT
Start: 2024-10-08 | End: 2024-10-08 | Stop reason: HOSPADM

## 2024-10-08 RX ORDER — BUPIVACAINE HYDROCHLORIDE 2.5 MG/ML
INJECTION, SOLUTION EPIDURAL; INFILTRATION; INTRACAUDAL
Status: DISCONTINUED | OUTPATIENT
Start: 2024-10-08 | End: 2024-10-08 | Stop reason: HOSPADM

## 2024-10-08 RX ORDER — ACETAMINOPHEN 500 MG
1000 TABLET ORAL
Status: COMPLETED | OUTPATIENT
Start: 2024-10-08 | End: 2024-10-08

## 2024-10-08 RX ORDER — FAMOTIDINE 10 MG/1
10 TABLET ORAL 2 TIMES DAILY
COMMUNITY

## 2024-10-08 RX ORDER — MIDAZOLAM HYDROCHLORIDE 1 MG/ML
INJECTION, SOLUTION INTRAMUSCULAR; INTRAVENOUS
Status: DISCONTINUED | OUTPATIENT
Start: 2024-10-08 | End: 2024-10-08

## 2024-10-08 RX ORDER — DEXMEDETOMIDINE HYDROCHLORIDE 100 UG/ML
INJECTION, SOLUTION INTRAVENOUS
Status: DISCONTINUED | OUTPATIENT
Start: 2024-10-08 | End: 2024-10-08

## 2024-10-08 RX ORDER — OXYCODONE HYDROCHLORIDE 5 MG/1
5 TABLET ORAL
Status: DISCONTINUED | OUTPATIENT
Start: 2024-10-08 | End: 2024-10-08 | Stop reason: HOSPADM

## 2024-10-08 RX ORDER — BACITRACIN ZINC 500 UNIT/G
OINTMENT (GRAM) TOPICAL
Status: DISCONTINUED | OUTPATIENT
Start: 2024-10-08 | End: 2024-10-08 | Stop reason: HOSPADM

## 2024-10-08 RX ORDER — SODIUM CHLORIDE 0.9 % (FLUSH) 0.9 %
10 SYRINGE (ML) INJECTION
Status: DISCONTINUED | OUTPATIENT
Start: 2024-10-08 | End: 2024-10-08 | Stop reason: HOSPADM

## 2024-10-08 RX ORDER — MUPIROCIN 20 MG/G
OINTMENT TOPICAL
Status: DISCONTINUED | OUTPATIENT
Start: 2024-10-08 | End: 2024-10-08 | Stop reason: HOSPADM

## 2024-10-08 RX ORDER — CEFAZOLIN SODIUM 1 G/3ML
INJECTION, POWDER, FOR SOLUTION INTRAMUSCULAR; INTRAVENOUS
Status: DISCONTINUED | OUTPATIENT
Start: 2024-10-08 | End: 2024-10-08

## 2024-10-08 RX ORDER — PROPOFOL 10 MG/ML
VIAL (ML) INTRAVENOUS CONTINUOUS PRN
Status: DISCONTINUED | OUTPATIENT
Start: 2024-10-08 | End: 2024-10-08

## 2024-10-08 RX ORDER — METHOCARBAMOL 500 MG/1
1000 TABLET, FILM COATED ORAL ONCE
Status: COMPLETED | OUTPATIENT
Start: 2024-10-08 | End: 2024-10-08

## 2024-10-08 RX ORDER — ONDANSETRON HYDROCHLORIDE 2 MG/ML
INJECTION, SOLUTION INTRAVENOUS
Status: DISCONTINUED | OUTPATIENT
Start: 2024-10-08 | End: 2024-10-08

## 2024-10-08 RX ORDER — CELECOXIB 200 MG/1
400 CAPSULE ORAL ONCE
Status: COMPLETED | OUTPATIENT
Start: 2024-10-08 | End: 2024-10-08

## 2024-10-08 RX ORDER — HALOPERIDOL 5 MG/ML
0.5 INJECTION INTRAMUSCULAR EVERY 10 MIN PRN
Status: DISCONTINUED | OUTPATIENT
Start: 2024-10-08 | End: 2024-10-08 | Stop reason: HOSPADM

## 2024-10-08 RX ORDER — FAMOTIDINE 10 MG/ML
INJECTION INTRAVENOUS
Status: DISCONTINUED | OUTPATIENT
Start: 2024-10-08 | End: 2024-10-08

## 2024-10-08 RX ORDER — LIDOCAINE HYDROCHLORIDE 10 MG/ML
INJECTION, SOLUTION EPIDURAL; INFILTRATION; INTRACAUDAL; PERINEURAL
Status: DISCONTINUED | OUTPATIENT
Start: 2024-10-08 | End: 2024-10-08 | Stop reason: HOSPADM

## 2024-10-08 RX ADMIN — DEXMEDETOMIDINE 8 MCG: 100 INJECTION, SOLUTION, CONCENTRATE INTRAVENOUS at 07:10

## 2024-10-08 RX ADMIN — MIDAZOLAM HYDROCHLORIDE 2 MG: 2 INJECTION, SOLUTION INTRAMUSCULAR; INTRAVENOUS at 06:10

## 2024-10-08 RX ADMIN — METHOCARBAMOL 1000 MG: 500 TABLET ORAL at 08:10

## 2024-10-08 RX ADMIN — DEXAMETHASONE SODIUM PHOSPHATE 4 MG: 4 INJECTION, SOLUTION INTRAMUSCULAR; INTRAVENOUS at 07:10

## 2024-10-08 RX ADMIN — PROPOFOL 75 MCG/KG/MIN: 10 INJECTION, EMULSION INTRAVENOUS at 07:10

## 2024-10-08 RX ADMIN — CEFAZOLIN 2 G: 330 INJECTION, POWDER, FOR SOLUTION INTRAMUSCULAR; INTRAVENOUS at 07:10

## 2024-10-08 RX ADMIN — FAMOTIDINE 20 MG: 10 INJECTION, SOLUTION INTRAVENOUS at 06:10

## 2024-10-08 RX ADMIN — ONDANSETRON 4 MG: 2 INJECTION INTRAMUSCULAR; INTRAVENOUS at 07:10

## 2024-10-08 RX ADMIN — CELECOXIB 400 MG: 200 CAPSULE ORAL at 06:10

## 2024-10-08 RX ADMIN — PROPOFOL 50 MG: 10 INJECTION, EMULSION INTRAVENOUS at 07:10

## 2024-10-08 RX ADMIN — LIDOCAINE HYDROCHLORIDE 50 MG: 20 INJECTION INTRAVENOUS at 07:10

## 2024-10-08 RX ADMIN — ACETAMINOPHEN 1000 MG: 500 TABLET ORAL at 06:10

## 2024-10-08 RX ADMIN — MUPIROCIN: 20 OINTMENT TOPICAL at 06:10

## 2024-10-08 RX ADMIN — SODIUM CHLORIDE: 9 INJECTION, SOLUTION INTRAVENOUS at 06:10

## 2024-10-08 NOTE — ANESTHESIA PREPROCEDURE EVALUATION
10/08/2024  Pre-operative evaluation for Procedure(s) (LRB):  RELEASE, CARPAL TUNNEL (Right)  RELEASE, TRIGGER FINGER (Right)    Jayshree Bee is a 63 y.o. female     Patient Active Problem List   Diagnosis    Age-related osteoporosis without current pathological fracture    GERD without esophagitis    History of breast cancer       Review of patient's allergies indicates:  No Known Allergies    No current facility-administered medications on file prior to encounter.     Current Outpatient Medications on File Prior to Encounter   Medication Sig Dispense Refill    CELEBREX 200 mg capsule Take 200 mg by mouth 2 (two) times daily.      denosumab (PROLIA) 60 mg/mL Syrg Inject 60 mg into the skin every 6 (six) months.      famotidine (PEPCID) 10 MG tablet Take 10 mg by mouth 2 (two) times daily.      pantoprazole (PROTONIX) 40 MG tablet Take 1 tablet by mouth once daily.      prasterone, dhea, (INTRAROSA) 6.5 mg Inst Place 1 suppository vaginally every evening. 28 each 11    nitrofurantoin (MACRODANTIN) 100 MG capsule Take 1 capsule (100 mg total) by mouth nightly. 30 capsule 5    valACYclovir (VALTREX) 500 MG tablet Take 1 tablet (500 mg total) by mouth 2 (two) times daily. 60 tablet 11       Past Surgical History:   Procedure Laterality Date    AUGMENTATION OF BREAST Bilateral     AUGMENTATION OF BREAST Bilateral     BREAST BIOPSY Right 2004    BREAST LUMPECTOMY Right 2004    BREAST SURGERY Bilateral 2002     SECTION  3/17/1996    COSMETIC SURGERY  ,     Breast Augmentation, Breast Implant replacement and abdominoplasty    FRACTURE SURGERY  2014    Right Wrist    HYSTERECTOMY  2011    complete    OOPHORECTOMY  2011    w/ hysterectomy    SHOULDER SURGERY Right     rotator cuff/bicep tendon       Social History     Socioeconomic History    Marital status:    Tobacco Use     Smoking status: Never    Smokeless tobacco: Never   Substance and Sexual Activity    Alcohol use: Not Currently    Drug use: Not Currently    Sexual activity: Yes     Partners: Male     Birth control/protection: Post-menopausal, None     Comment: Menopause/Hysterectomy     EKG:  None on file    2D Echo:  No results found for this or any previous visit.      Patient Active Problem List   Diagnosis    Age-related osteoporosis without current pathological fracture    GERD without esophagitis    History of breast cancer     Past Surgical History:   Procedure Laterality Date    AUGMENTATION OF BREAST Bilateral     AUGMENTATION OF BREAST Bilateral 2014    BREAST BIOPSY Right 2004    BREAST LUMPECTOMY Right 2004    BREAST SURGERY Bilateral      SECTION  3/17/1996    COSMETIC SURGERY  ,     Breast Augmentation, Breast Implant replacement and abdominoplasty    FRACTURE SURGERY  2014    Right Wrist    HYSTERECTOMY  2011    complete    OOPHORECTOMY      w/ hysterectomy    SHOULDER SURGERY Right     rotator cuff/bicep tendon       Pre-op Assessment    I have reviewed the Patient Summary Reports.     I have reviewed the Nursing Notes. I have reviewed the NPO Status.   I have reviewed the Medications.     Review of Systems  Cardiovascular:            Denies Angina.       Denies LENZ.                            Pulmonary:    Denies COPD.  Denies Asthma.                    Renal/:   Denies Chronic Renal Disease.                Hepatic/GI:     GERD             Neurological:    Denies CVA.    Denies Seizures.                                Endocrine:  Denies Diabetes.               Physical Exam  General: Well nourished    Airway:  Mallampati: II   Mouth Opening: Normal  TM Distance: Normal  Tongue: Normal  Neck ROM: Normal ROM        Anesthesia Plan  Type of Anesthesia, risks & benefits discussed:    Anesthesia Type: Gen Natural Airway, MAC  Intra-op Monitoring Plan: Standard ASA Monitors  Post Op  Pain Control Plan: multimodal analgesia  Induction:  IV  Informed Consent: Patient consented to blood products? No  ASA Score: 2  Day of Surgery Review of History & Physical: H&P Update referred to the surgeon/provider.    Ready For Surgery From Anesthesia Perspective.     .

## 2024-10-08 NOTE — OP NOTE
[facility]  Surgery Department  Operative Note    [facility]  Surgery Department  Operative Note    SUMMARY     Date of Procedure: 10/8/2024     Procedure:   1. Right carpal tunnel release, cpt 85901  2. Right long finger flexor tenosynovectomy, cpt 23902  3. Ganglion cyst of tendon sheath excision right long finger, cpt 72521  4.  Right long finger MP mass excision, cpt 65374      Surgeons and Role:     * Halie Mata MD - Primary    Assisting Surgeon: Cesar THAYER Assistant: Michael Garcia    Pre-Operative Diagnosis: Right carpal tunnel syndrome [G56.01]  Trigger middle finger of right hand [M65.331]  Ganglion cyst of flexor tendon sheath of finger of right hand [M67.441]  Right long finger mass MP joint    Post-Operative Diagnosis: Post-Op Diagnosis Codes:     * Right carpal tunnel syndrome [G56.01]     * Trigger middle finger of right hand [M65.331]     * Ganglion cyst of flexor tendon sheath of finger of right hand [M67.441]  Right long finger mass MP joint    Anesthesia: Local MAC    Indication for Procedure: 62 yo female with right carpal tunnel syndrome confirmed by EMG/NCS, stenosing flexor tenosynovitis and dorsal finger mass that had failed conservative treatment. Risks and benefits of the procedure were discussed with the patient and informed consent was obtained.    Description of the Findings of the Procedure: The patient was seen in the preoperative holding area and the right hand was marked. The patient was taken to the OR, placed supine on the table, and a padded hand table was used. After monitored anesthesia care was administered without difficulty, a time-out procedure was performed identifying the patient, the operative site and the procedure to be performed.  A tourniquet was placed on the arm and the upper extremity was prepped and draped in standard sterile fashion. The upper extremity was elevated and exsanguinated with an Esmarch  bandage, and the tourniquet was inflated to 250 mm Hg. 10cc of 1% lidocaine plain and 0.25% bupivacaine was used for a local block of the carpal tunnel. A 2 cm incision was made over the right carpal tunnel.  The palmar fascia was sharply incised.  Cristian retractors were used to expose the transverse carpal ligament, this was sharply incised.  A carpal tunnel skid was placed underneath the transverse carpal ligament proximally, and the proximal transverse carpal ligament as well as the distal forearm fascia was sharply released. The median nerve was found to be significantly compressed through the carpal tunnel. The carpal tunnel skid was replaced distally, and the transverse carpal ligament was released under direct visualization.  The median nerve was found to be completely decompressed.    Attention was turned to the right long finger. A 3 cm incision was made over the A1 pulley of the right long finger. Littler scissors were used to dissect down to the flexor sheath.  There was a ganglion of tendon sheath which was sharply excised, it measured 3 x 3 mm and was sent for permanent pathological specimen.  The A1 pulley was sharply incised, and released both proximally and distally. The A2 pulley was protected. There was a moderate amount of flexor tenosynovitis on the flexor tendon, this was sharply excised with Littler scissors. The neurovascular bundles were identified and protected throughout the case. The finger were put through a range of motion and there was no longer any catching and locking.    A 1 cm mid lateral incision was made over ulnar border of the right long finger MCP joint. Littler scissors were used to dissect down to the mass.  The mass was well encapsulated and removed in its entirety.  It measured 5 x 6mm and was for permanent pathological specimen.      The neurovascular bundles were identified and protected throughout the case. The wound was then irrigated with normal saline using a bulb  syringe. 3-0 prolene was used to close the skin in a horizontal mattress fashion. Adaptic, 4x4, cast padding and coban were used to dress the hand. The tourniquet was deflated and the hand and fingers were pink and well-perfused.  All instrument, sponge and needle counts were correct. The patient tolerated the procedure well.  She was taken awake, alert and in good condition to the recovery room.    Complications: No    Estimated Blood Loss (EBL): none           Specimens:   Specimen (24h ago, onward)       Start     Ordered    10/08/24 0815  Specimen to Pathology, Surgery Orthopedics  Once        Comments: Pre-op Diagnosis: Right carpal tunnel syndrome [G56.01]Trigger middle finger of right hand [M65.331]Ganglion cyst of flexor tendon sheath of finger of right hand [M67.441]Procedure(s):RELEASE, CARPAL TUNNELEXCISION, GANGLION CYST, LONG FINGERTENOSYNOVECTOMY, LONG FINGEREXCISION, MASS, HAND Number of specimens:2Name of specimens:1. Ganglion cyst tendon sheath volar right long finger  2. Mass dorsal MP Joint right hand     References:    Click here for ordering Quick Tip   Question Answer Comment   Procedure Type: Orthopedics    Release to patient Immediate        10/08/24 0817    10/08/24 0803  Specimen to Pathology, Surgery Orthopedics  Once        Comments: Pre-op Diagnosis: Right carpal tunnel syndrome [G56.01]Trigger middle finger of right hand [M65.331]Ganglion cyst of flexor tendon sheath of finger of right hand [M67.441]Procedure(s):RELEASE, CARPAL TUNNELRELEASE, TRIGGER, LONG FINGEREXCISION, GANGLION CYST, LONG FINGERTENOSYNOVECTOMY, LONG FINGER Number of specimens: 2Name of specimens: 1. Ganglion cyst tendon sheath volar right long finger 2. Ganglion cyst dorsal MP joint right long finger     References:    Click here for ordering Quick Tip   Question Answer Comment   Procedure Type: Orthopedics    Release to patient Immediate        10/08/24 0805                            Condition: Good    Disposition:  PACU - hemodynamically stable.    Attestation: I was present and scrubbed for the entire procedure.

## 2024-10-08 NOTE — H&P
Halie Mata MD at 7/17/2024 10:15 AM    Status: Signed   Expand All Collapse All  I saw Jayshree Bee presents for follow up evaluation of        Encounter Diagnoses   Name Primary?    Right carpal tunnel syndrome Yes    Trigger middle finger of right hand      Ganglion cyst of flexor tendon sheath of finger of right hand      Primary osteoarthritis of first carpometacarpal joint of left hand      Chronic pain of right hand        History of Present Illness   Patient had steroid injection right long finger 100% Improvement for about 1 year and left thumb cmc joint 100% Improvement for 1 year as well. She continues to have right hand numbness, temperature change, dropping objects and decreased  strength. Her right long finger is locking if she picks up something heavy and she has to manually pull into extension.      The patient experienced a fall in 08/2023, necessitating a rhizotomy of C3-C4. Post-rhizotomy, her cervical lump pain has subsided, and her neck pain has significantly improved, attributing the pains to her trapezius muscles. However, she reports persistent triggering in her fingers, which commenced in either 10/2023 or 09/2023. Initially, the triggering was not severe, but it has since become increasingly bothersome, necessitating a joint straightening. She experiences significant pain in her palm, a palpable cyst, and numbness in her fingers, predominantly on the right side.      A physical examination conducted by Dr. Pope, a pain specialist, suggested the possibility of carpal tunnel syndrome, the cause of her numbness, rather than her neck. She has one splint for her right hand, but has not yet acquired one for her left hand. An x-ray report revealed subchondral sclerosis in both hands, with the left side being more affected than the right. She expresses a desire to receive an injection today. She sustained a whiplash injury after landing on a tractor, which she believes may have  contributed to her current symptoms. She owns a right-handed thumb brace.      Her mother has had carpal tunnel in both hands. Her daughter has been diagnosed with dermatomyositis.     Patient fell 8. 2023 - Sees [facility] -  RFA C3/C4 04.2024  Vitals       Vitals:     07/17/24 1023   PainSc: 0-No pain   PainLoc: Hand            PE:     AA&O x 4.  NAD  HEENT:  NCAT, sclera nonicteric  Lungs:  Respirations are equal and unlabored.  CV:  2+ bilateral upper and lower extremity pulses.  MSK:   Physical Exam       5 x 5 mm smooth compressible and well-circumscribed mass dorsum of right long finger middle phalanx, ganglion of tendon sheath right long finger volar A1 pulley, tender to palpation A1 pulley demonstrable catching, tender to palpation left thumb CMC joint decreased CMC motion, + grind test left thumb, + compression, Tinels and phalens right wrist, otherwise bilateral ROM hand/wrist/elbow full. Neurovascularly intact bilaterally.  5/5 thenar and intrinsic musculature strength.     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      Assessment/Plan:        Encounter Diagnoses   Name Primary?    Right carpal tunnel syndrome Yes    Trigger middle finger of right hand      Ganglion cyst of flexor tendon sheath of finger of right hand      Primary osteoarthritis of first carpometacarpal joint of left hand      Chronic pain of right hand        Assessment & Plan  We have discussed the natural history of carpal tunnel, trigger finger and basilar thumb arthritis including treatment options such as splinting, oral and topical anti-inflammatories, cortisone injections and surgery.  I have ordered a nerve conduction study.       -I have offered her a  selective injection. I have explained the risks, benefits, and alternatives of the procedure in detail.  The patient voices understanding and all questions have been answered. The patient agrees to proceed as planned. So after a sterile prep of the skin in the normal fashion the right carpal tunnel was injected using a 25 gauge needle with a combination of 1cc 1% plain lidocaine and 40 mg of methylprednisolone.  The patient is cautioned and immediate relief of pain is secondary to the local anesthetic and will be temporary.  After the anesthetic wears off there may be a increase in pain that may last for a few hours or a few days and they should use ice to help alleviate this flair up of pain. Patient tolerated the procedure well.     We have discussed conservative vs. surgical treatment as well as risks, benefits and alternatives for right carpal tunnel syndrome, right long trigger finger, right ganglion of tendon sheath.  Conservative measures have been exhausted and she would like to proceed with surgery. Surgery would include right carpal tunnel release, right long trigger finger release, right ganglion of tendon sheath excision. Consent signed today in clinic. Light use of the hand will be indicated for the first 4-6 weeks.     We have discussed risks of hand surgery which include but are not limited to blood clots in the legs that can travel to the lungs (pulmonary embolism). Pulmonary embolism can cause shortness of breath, chest pain, and even shock. Other risks include urinary tract infection, nausea and vomiting (usually related to pain medication), chronic pain, bleeding, nerve damage, blood vessel injury, scarring and infection of the hand which can require re-operation. Furthermore, the risks of anesthesia include potential heart, lung, kidney, and liver damage.  Informed consent was obtained.  The patient understands and would like to proceed with surgery in the near future.           F/U after  EMG/NCS  Call with any questions/concerns in the interim      I       Halie Mata MD     Please be aware that this note has been generated with the assistance of [employer] voice-to-text.  Please excuse any spelling or grammatical errors.     This note was generated with the assistance of ambient listening technology. Verbal consent was obtained by the patient and accompanying visitor(s) for the recording of patient appointment to facilitate this note. I attest to having reviewed and edited the generated note for accuracy, though some syntax or spelling errors may persist. Please contact the author of this note for any clarification.

## 2024-10-08 NOTE — PLAN OF CARE
Patient is AAO and VSS.  Tolerating PO and states pain is tolerable.  Dressing CDI.  Patient states they are ready for d/c.  IV removed.  Catheter tip intact.  Caregiver at bedside.  Discharge instructions reviewed and copy given to the patient and caregiver.  Questions answered.  Both verbalized understanding.  Medication delivered to bedside and reviewed. No DME needed.  Patient wheeled to car by RN with NAD noted.

## 2024-10-08 NOTE — INTERVAL H&P NOTE
The patient has been examined and the H&P has been reviewed:    I concur with the findings and changes have been noted since the H&P was written: Right long finger dorsal finger mass is bothering her. Will remove in surgery.    Anesthesia/Surgery risks, benefits and alternative options discussed and understood by patient/family.          There are no hospital problems to display for this patient.

## 2024-10-08 NOTE — DISCHARGE INSTRUCTIONS
HAND SURGERY - Care of the Hand after Surgery       Post-Op Care  It is important to follow your orthopaedic surgeon's instructions carefully after you return home. You should ask   someone to check on you that evening. The protocols described here are general in nature. Every person and   every surgery is different so the information given here is for guidance only. If you have questions you should   contact us.     Day of Surgery    You were place in a soft dressing with coban today to protect the surgical area during healing. Do not remove the soft dressing with coban until you are seen by Occupational Therapy or Dr. Mata for your first postop visit    The hand and fingers may be numb for quite some time after surgery. This is due to the anesthetic block used at the time of surgery for pain control.   If you have an arm sling you may remove the sling at your convenience once you regain control of your arm from the anesthetic block.     Begin taking liquids and food as soon as you can. You should always eat some solid food, a sandwich or light meal, a little while before taking your pain meds.     Day 3  Things are much the same on the third day after your surgery. Usually you have less pain and feel like doing more.    Showering: It is important to keep the incision absolutely dry while showering or bathing (use two plastic bags over your hand) or a shower bag.   If you feel that the pain medication you were given after surgery is stronger than you really need you can reduce the dose, take it less frequently or switch to ibuprofen or Tylenol. If you received antibiotics they should be taken until the entire prescription is completed.    Day 10-11  Occupational Therapy will see you between this time. Please let us know if you are not scheduled [phone]    Week 3  We will see you back after your surgery to review with you what was done in surgery and will talk about rehab and answer any questions you may have.        HAND SURGERY  Driving: You can drive if you are comfortable and have regained full finger movements and if you have sufficient power to control the vehicle.   Return to work: Timing of your return to work is variable according to your occupation and specific surgery. We should discuss this at your follow up visit if not already discussed prior.  Elevation: Hand elevation is important to prevent swelling and stiffness of the fingers. One minute of leaving your hand dangling negates four hours of keeping it elevated. Please remember not to walk with your hand hanging down or to sit with your hand resting in your lap. It is fine, however, to lower your hand for light use and you should get back to normal light activities as soon as possible as guided by common sense.     Post-operative exercises  [x] Bend your fingers  Relax your hand. Start with your fingers straight and close together. Bend the end and middle joints of your fingers. Keep your wrist and knuckles straight. Moving slowly and smoothly, return your hand to the starting position. Repeat with your other hand. If you can, perform multiple repetitions of this exercise on each hand.    [x] Open your hand wide                       Spread your fingers apart as wide as you can and hold that position. Slowly relax your fingers and bring them together. Return to the open-wide position. Repeat with each hand and gradually add to the number of repetitions.    [x] Make a fist  Start with your fingers straight and spread apart. Make a loose, gentle fist and wrap your thumb around the outside of your fingers. Be careful not to squeeze your fingers together too tightly. Moving slowly and smoothly, return to the starting position. Repeat. Perform this exercise on both hands.    [x] Touch your fingertips  Straighten your fingers and thumb. Bend your thumb across your palm, touching the tip of your thumb to the pad of your hand just below your pinky finger. If you  "can't make your thumb touch, just stretch as far as you can. Return your thumb to its starting position, as shown in images 1 through 3.  For the next exercise, form the letter O by touching your thumb to each fingertip, as shown in images 4 through 6. Moving slowly and smoothly, touch your index finger to your thumb, then straighten your fingers. Touch your middle finger to your thumb and straighten. Follow with your ring and pinky fingers.    [x] Walk your fingers  Rest your hand on a flat surface, such as a tabletop, with your palm facing down and your fingers spread slightly apart. Moving one finger at a time, slowly walk your fingers toward your thumb. Start by lifting and moving your index finger toward your thumb. Follow by lifting and moving your middle finger toward your thumb. Proceed with moving your ring finger and then your pinky finger toward your thumb. Don't move your wrist or thumb while doing this exercise. Repeat with your other hand.      HAND SURGERY - Common Concerns and Frequently Asked Questions    When to Call the Doctor  Pain, burning, or numbness of the fingers or the back of the hand not relieved by elevation of the arm  Pale or cold finger; bluish nail beds  Red line or streak going up the arm  Excessive swelling  Fever over 100.3ºF  Pain unrelieved by pain medication    Tips for one armed living  It helps to have...   In the shower   Plastic bags and rubber bands to cover bandages - the bags that newspapers come in are good to cover the hand and wrist. Otherwise small trash can liners will do. Use two at a time.   Bottle sponge (soft sponge on a long stick) - for the armpit of your "good" hand.   Shower brush   A hair brush in the shower will help you to wash your hair.   Cotton evelio cloth bathrobe - to dry your back.    In the bathroom   Toothpaste, shampoo, etc. in flip-top or pump (not screw top) dispensers.   Consider an electric razor.   Flossers (dental floss on a "Y" shaped " "handle).    In the kitchen   Dycem mat (rubber jar opener mat) - to help open jars, but also keep things from sliding around while you are working on them.    Double suction cup pads ("little Octopus") - to hold items while you use or wash them.   Electric can opener with a lid magnet strong enough to hold the can in the air - for one handed use.   In the bedroom   Back scratcher.   Large sleeve shirts and tops.   Put away clothing which buttons, fastens or snaps in the back or which uses drawstrings.    Sports bra or a camisole instead of a bra.   [employer] nylons can be pulled on one handed - most others can't.   A "wash and wear" haircut.     "

## 2024-10-08 NOTE — DISCHARGE SUMMARY
[facility] Surgery ([facility])  Brief Operative Note    Surgery Date: 10/8/2024     Surgeons and Role:     * Halie Mata MD - Primary    Assisting Surgeon: None    Pre-op Diagnosis:  Right carpal tunnel syndrome [G56.01]  Trigger middle finger of right hand [M65.331]  Ganglion cyst of flexor tendon sheath of finger of right hand [M67.441]    Post-op Diagnosis:  Post-Op Diagnosis Codes:     * Right carpal tunnel syndrome [G56.01]     * Trigger middle finger of right hand [M65.331]     * Ganglion cyst of flexor tendon sheath of finger of right hand [M67.441]    Procedure(s) (LRB):  RELEASE, CARPAL TUNNEL (Right)  EXCISION, GANGLION CYST, LONG FINGER (Right)  TENOSYNOVECTOMY, LONG FINGER (Right)  EXCISION, MASS, HAND (Right)    Anesthesia: Local MAC    Estimated Blood Loss: * No values recorded between 10/8/2024  7:24 AM and 10/8/2024  8:13 AM *         Specimens:   Specimen (24h ago, onward)       Start     Ordered    10/08/24 0815  Specimen to Pathology, Surgery Orthopedics  Once        Comments: Pre-op Diagnosis: Right carpal tunnel syndrome [G56.01]Trigger middle finger of right hand [M65.331]Ganglion cyst of flexor tendon sheath of finger of right hand [M67.441]Procedure(s):RELEASE, CARPAL TUNNELEXCISION, GANGLION CYST, LONG FINGERTENOSYNOVECTOMY, LONG FINGEREXCISION, MASS, HAND Number of specimens:2Name of specimens:1. Ganglion cyst tendon sheath volar right long finger  2. Mass dorsal MP Joint right hand     References:    Click here for ordering Quick Tip   Question Answer Comment   Procedure Type: Orthopedics    Release to patient Immediate        10/08/24 0817    10/08/24 0803  Specimen to Pathology, Surgery Orthopedics  Once        Comments: Pre-op Diagnosis: Right carpal tunnel syndrome [G56.01]Trigger middle finger of right hand [M65.331]Ganglion cyst of flexor tendon sheath of finger of right hand [M67.441]Procedure(s):RELEASE, CARPAL TUNNELRELEASE, TRIGGER, LONG FINGEREXCISION, GANGLION CYST, LONG  FINGERTENOSYNOVECTOMY, LONG FINGER Number of specimens: 2Name of specimens: 1. Ganglion cyst tendon sheath volar right long finger 2. Ganglion cyst dorsal MP joint right long finger     References:    Click here for ordering Quick Tip   Question Answer Comment   Procedure Type: Orthopedics    Release to patient Immediate        10/08/24 0805                      Discharge Note    OUTCOME: Patient tolerated treatment/procedure well without complication and is now ready for discharge.    DISPOSITION: Home or Self Care    FINAL DIAGNOSIS:   Right carpal tunnel syndrome [G56.01]  Trigger middle finger of right hand [M65.331]  Ganglion cyst of flexor tendon sheath of finger of right hand [M67.441]    FOLLOWUP: In clinic    DISCHARGE INSTRUCTIONS:    Discharge Procedure Orders   Diet general     Keep surgical extremity elevated     Lifting restrictions     Leave dressing on - Keep it clean, dry, and intact until clinic visit     Call MD for:  temperature >100.4     Call MD for:  persistent nausea and vomiting     Call MD for:  severe uncontrolled pain     Call MD for:  difficulty breathing, headache or visual disturbances     Call MD for:  redness, tenderness, or signs of infection (pain, swelling, redness, odor or green/yellow discharge around incision site)     Call MD for:  hives     Call MD for:  persistent dizziness or light-headedness     Call MD for:  extreme fatigue

## 2024-10-08 NOTE — TRANSFER OF CARE
Anesthesia Transfer of Care Note    Patient: Jayshree Bee    Procedure(s) Performed: Procedure(s) (LRB):  RELEASE, CARPAL TUNNEL (Right)  EXCISION, GANGLION CYST, LONG FINGER (Right)  TENOSYNOVECTOMY, LONG FINGER (Right)  EXCISION, MASS, HAND (Right)    Patient location: PACU    Anesthesia Type: general    Transport from OR: Transported from OR on 100% O2 by closed face mask with adequate spontaneous ventilation    Post pain: adequate analgesia    Post assessment: no apparent anesthetic complications    Post vital signs: stable    Level of consciousness: awake and responds to stimulation    Nausea/Vomiting: no nausea/vomiting    Complications: none    Transfer of care protocol was followed    Last vitals: Visit Vitals  /73 (BP Location: Left arm, Patient Position: Lying)   Pulse 64   Temp 36.6 °C (97.9 °F) (Oral)   Resp 16   Ht 5' (1.524 m)   Wt 55.8 kg (123 lb)   SpO2 96%   Breastfeeding No   BMI 24.02 kg/m²

## 2024-10-09 LAB
FINAL PATHOLOGIC DIAGNOSIS: NORMAL
GROSS: NORMAL
Lab: NORMAL
MICROSCOPIC EXAM: NORMAL

## 2024-10-09 NOTE — ANESTHESIA POSTPROCEDURE EVALUATION
Anesthesia Post Evaluation    Patient: Jayshree Bee    Procedure(s) Performed: Procedure(s) (LRB):  RELEASE, CARPAL TUNNEL (Right)  EXCISION, GANGLION CYST, LONG FINGER (Right)  TENOSYNOVECTOMY, LONG FINGER (Right)  EXCISION, MASS, HAND (Right)    Final Anesthesia Type: general      Patient location during evaluation: PACU  Patient participation: Yes- Able to Participate  Level of consciousness: awake and alert and oriented  Post-procedure vital signs: reviewed and stable  Pain management: adequate  Airway patency: patent    PONV status at discharge: No PONV  Anesthetic complications: no      Cardiovascular status: blood pressure returned to baseline and hemodynamically stable  Respiratory status: unassisted, room air and spontaneous ventilation  Hydration status: euvolemic  Follow-up not needed.              Vitals Value Taken Time   /78 10/08/24 0902   Temp 36.1 °C (97 °F) 10/08/24 0817   Pulse 65 10/08/24 0910   Resp 19 10/08/24 0900   SpO2 98 % 10/08/24 0910   Vitals shown include unfiled device data.      Event Time   Out of Recovery 09:05:00         Pain/Evangelina Score: Pain Rating Prior to Med Admin: 0 (10/8/2024  9:10 AM)  Pain Rating Post Med Admin: 0 (10/8/2024  9:10 AM)  Evangelina Score: 10 (10/8/2024  8:30 AM)

## 2024-10-16 ENCOUNTER — PATIENT MESSAGE (OUTPATIENT)
Dept: ORTHOPEDICS | Facility: CLINIC | Age: 64
End: 2024-10-16
Payer: COMMERCIAL

## 2024-10-17 NOTE — PROGRESS NOTES
[facility]  Occupational Therapy Initial Evaluation           Date: 10/18/2024  Name: Jayshree Bee  Clinic Number: [id number]    Therapy Diagnosis:   Encounter Diagnoses   Name Primary?    Weakness Yes    Decreased range of motion      Physician: Halie Mata MD    Physician Orders: Evaluate and treat ;   Medical Diagnosis:   Diagnosis   G56.01 (ICD-10-CM) - Right carpal tunnel syndrome   M65.331 (ICD-10-CM) - Trigger middle finger of right hand   M67.441 (ICD-10-CM) - Ganglion cyst of flexor tendon sheath of finger of right hand     Evaluation Date: 10/18/2024  Date of Surgery: 10-8-2024  Insurance Authorization  Period Expiration : 12/24    Plan of Care Expiration: 1/18/2024  Date of Return to MD: 10-    Visit # / Visits authorized: 1 / 1  Time In:9  Time Out: 10  Total Billable Time: 60 minutes    Precautions:  Standard    FOTO: 0/2 ( did not take)              Subjective       Involved Side: Right hand/wrist  Dominant Side: Right    Date of Onset: N/A    History of Current Condition/Mechanism of Injury:  Right carpal tunnel syndrome [G56.01]     * Trigger middle finger of right hand [M65.331]     * Ganglion cyst of flexor tendon sheath of finger of right hand [M67.441]  Right long finger mass MP joint    Imaging: Please see image report     Surgical Procedure and Date:  Right carpal tunnel syndrome [G56.01]     * Trigger middle finger of right hand [M65.331]     * Ganglion cyst of flexor tendon sheath of finger of right hand [M67.441]  Right long finger mass MP joint    Past Medical History/Physical Systems Review:   Jayshree Bee  has a past medical history of Breast cancer, GERD (gastroesophageal reflux disease), and Osteoporosis.    Jayshree Bee  has a past surgical history that includes Breast surgery (Bilateral, 2002); Breast lumpectomy (Right, 2004); Breast biopsy (Right, 2004); Hysterectomy (2011); Oophorectomy (2011); Augmentation of breast (Bilateral,  ); Augmentation of breast (Bilateral, );  section (3/17/1996); Fracture surgery (2014); Cosmetic surgery (, ); Shoulder surgery (Right); Carpal tunnel release (Right, 10/8/2024); Excision of ganglion cyst of hand (Right, 10/8/2024); Tenosynovectomy (Right, 10/8/2024); and Excision of mass of hand (Right, 10/8/2024).    Jayshree has a current medication list which includes the following prescription(s): celebrex, prolia, famotidine, nitrofurantoin, ondansetron, pantoprazole, intrarosa, tizanidine, tramadol, and valacyclovir.    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     Current FunctionalStatus   Home/Living environment : lives with their family      Limitation of Functional Status as follows:   ADLs/IADLs: Prior functional status of self care needs: was independent in feeding, dressing, grooming, leisure task                Current status of self care needs: Independent while protecting hand and wrist   Objective     Observation/Appearance:  Joint stiffness , arrived with right post op dressing, stitches removed       Sensation: Median Nerve Distribution   10/18/2024    Right   Monofilament Testing      Normal 1.65-2.83 Present   Diminished Light Touch 3.22-3.61 Present   Diminished Protective 3.84-4.31 Present   Loss of Protective 4.56-6.65 Present   Untestable >6.65 Present       Wound Assessment:   - removed bandages, stitches removed, non-draining         Range of Motion:   Right Active   10/18/2024   Long                          MP Ext/Flex 0/85                          PIP Ext/Flex 0/81                         DIP Ext/Flex 0/60   THUMB                          MP Ext/Flex 0/full                         IP Ext/Flex 0/full                         Carrera ABD 50                         Radial ABD 50                         Opposition To SF P1     Comments:      Wrist ROM: Right  Active   10/18/2024   Extension 55   Flexion 80   Radial Deviation full   Ulnar Deviation full   Supination full   Pronation full               Intake Outcome Measure for FOTO hand  Survey    Therapist reviewed FOTO scores for Jayshree Bee on 10/18/2024.   FOTO documents entered into [facility] - see Media section.           Treatment     Total Treatment time separate from Evaluation time: about 15 minutes after evaluation performed the following and went over HEP    Jayshree received the treatments listed below:     Supervised modalities after being cleared for contradictions:     Patient received paraffin bath to moist heat pack R hand(s) for 5 minutes to increase blood flow, circulation, pain management and for tissue elasticity prior to therex.     - went over HEP and removed stitches       We talked at length about the anatomy and pathophysiology of diagnosis , prevention of injury/re-injury, and answered all questions patients had.    Home Exercise Program/Education:  Issued HEP (see patient instructions in EMR) and educated on modality use for pain management . Exercises were reviewed and Jayshree was able to demonstrate them prior to the end of the session.   Pt received a written copy of exercises to perform at home. Jayshree demonstrated good  understanding of the education provided.  Pt was advised to perform these exercises free of pain, and to stop performing them if pain occurs.    Patient/Family Education: role of OT, goals for OT, double booking , scheduling/cancellations - pt verbalized understanding. Discussed insurance limitations with patient.    Additional Education provided: role of  CHT/OT, goals for CHT/OT, discussed insurance limitation with patient- patient verbalized understanding           Assessment     This 63 y.o. female referred to Outpatient Occupational Therapy with diagnosis of   Encounter Diagnoses   Name Primary?    Weakness Yes    Decreased range of motion     presents with limitations as described in problem list. Patient can benefit from Occupational Therapy services for Ultrasound, moist heat, AAROM, AROM, Theraputic exercises, joint mobs, home exercise program provied with written instructions, and strengthening and Orthosis, if deemed necessary . The following goals were discussed with the patient and she is in agreement with them as to be addressed in the treatment plan.   The patient's rehab potential is Good.     Problem List:   Decreased function of Right UE, Decreased ROM, Increased pain, Decreased strength, and Inability to perform work/tasks      Anticipated barriers to occupational therapy: healing   Pt has no cultural, educational or language barriers to learning provided.      Medical Necessity is demonstrated by the following  Occupational Profile/History  Co-morbidities and personal factors that may impact the plan of care [x] LOW: Brief chart review  [] MODERATE: Expanded chart review   [] HIGH: Extensive chart review    Moderate / High Support Documentation     Examination  Performance deficits relating to physical, cognitive or psychosocial skills that result in activity limitations and/or participation restrictions  [x] LOW: addressing 1-3 Performance deficits  [] MODERATE: 3-5 Performance deficits  [] HIGH: 5+ Performance deficits (please support below)    Moderate / High Support Documentation:    Physical:  Joint Mobility  Muscle Power/Strength  Pinch Strength  Fine Motor Coordination    Cognitive:  No Deficits    Psychosocial:    No Deficits  NO deficits      Treatment Options [x] LOW: Limited options  [] MODERATE: Several options  [] HIGH: Multiple  options      Decision Making/ Complexity Score: low             The following goals were discussed with the patient and patient is in agreement with them as to be addressed in the treatment plan.     Goals:   ST-8  weeks :  1) Patient to be IND with HEP and modalities for pain managment.  2) Patient will  Increase Active Range of motion 8-10 degrees in hand/wrist to increase functional hand use for ADLs/work/leisure activities.  3) Pt will  increase FOTO  intake score by 15 % to increase their hand/wrist functional ability.  4)Pt will increase  strength by 10-20 lbs. to improve functional grasp  for ADLs/work/leisure activities.   5) Pt will increase pinch strength in key and lateral limited positions by 1-3 psi to assist with manipulation and fine motor task    LTG:   Goals to be met in 8-10  weeks:    1) Patient to be IND with HEP and modalities for pain management.  2) Patient will  Increase Active Range of motion 15-20 degrees in hand/wrist to increase functional hand use for ADLs/work/leisure activities.  3)Pt will increase  strength 25-30 lbs. to improve functional grasp for ADLs/work/leisure activities.   4) Pt will increase pinch strength in key and lateral pinch  limited positions by 3-5 psi psi to assist with manipulation and fine motor task  5) Pt will increase  FOTO  intake score by 10 % to increase their hand/wrist functional ability .      Plan       Certification Period/Plan of care expiration: 10/18/2024 to 2024.    Outpatient Occupational Therapy 2 times weekly for 6 weeks to include the following interventions: Paraffin, Fluidotherapy, Manual therapy/joint mobilizations, Modalities for pain management, US 3 mhz, Therapeutic exercises/activities., Orthotic Fabrication/Fit/Training, Scar Management, Wound Care, and Joint Protection.      Sudha Flaherty, OT  Occupational therapist, Certified Hand Therapist

## 2024-10-18 ENCOUNTER — CLINICAL SUPPORT (OUTPATIENT)
Dept: REHABILITATION | Facility: HOSPITAL | Age: 64
End: 2024-10-18
Payer: COMMERCIAL

## 2024-10-18 DIAGNOSIS — M25.60 DECREASED RANGE OF MOTION: ICD-10-CM

## 2024-10-18 DIAGNOSIS — R53.1 WEAKNESS: Primary | ICD-10-CM

## 2024-10-18 PROCEDURE — 97530 THERAPEUTIC ACTIVITIES: CPT

## 2024-10-18 PROCEDURE — 97165 OT EVAL LOW COMPLEX 30 MIN: CPT

## 2024-10-18 NOTE — PATIENT INSTRUCTIONS
Complete massage 2-3 minutes 4 times a day:                       Complete 10 repetitions of each exercise 4 times a day:

## 2024-10-18 NOTE — PLAN OF CARE
[facility]  Occupational Therapy Initial Evaluation           Date: 10/18/2024  Name: Jayshree Bee  Clinic Number: [id number]    Therapy Diagnosis:   Encounter Diagnoses   Name Primary?    Weakness Yes    Decreased range of motion      Physician: Halie Mata MD    Physician Orders: Evaluate and treat ;   Medical Diagnosis:   Diagnosis   G56.01 (ICD-10-CM) - Right carpal tunnel syndrome   M65.331 (ICD-10-CM) - Trigger middle finger of right hand   M67.441 (ICD-10-CM) - Ganglion cyst of flexor tendon sheath of finger of right hand     Evaluation Date: 10/18/2024  Date of Surgery: 10-8-2024  Insurance Authorization  Period Expiration : 12/24    Plan of Care Expiration: 1/18/2024  Date of Return to MD: 10-    Visit # / Visits authorized: 1 / 1  Time In:9  Time Out: 10  Total Billable Time: 60 minutes    Precautions:  Standard    FOTO: 0/2 ( did not take)              Subjective       Involved Side: Right hand/wrist  Dominant Side: Right    Date of Onset: N/A    History of Current Condition/Mechanism of Injury:  Right carpal tunnel syndrome [G56.01]     * Trigger middle finger of right hand [M65.331]     * Ganglion cyst of flexor tendon sheath of finger of right hand [M67.441]  Right long finger mass MP joint    Imaging: Please see image report     Surgical Procedure and Date:  Right carpal tunnel syndrome [G56.01]     * Trigger middle finger of right hand [M65.331]     * Ganglion cyst of flexor tendon sheath of finger of right hand [M67.441]  Right long finger mass MP joint    Past Medical History/Physical Systems Review:   Jayshree Bee  has a past medical history of Breast cancer, GERD (gastroesophageal reflux disease), and Osteoporosis.    Jayshree Bee  has a past surgical history that includes Breast surgery (Bilateral, 2002); Breast lumpectomy (Right, 2004); Breast biopsy (Right, 2004); Hysterectomy (2011); Oophorectomy (2011); Augmentation of breast (Bilateral,  ); Augmentation of breast (Bilateral, );  section (3/17/1996); Fracture surgery (2014); Cosmetic surgery (, ); Shoulder surgery (Right); Carpal tunnel release (Right, 10/8/2024); Excision of ganglion cyst of hand (Right, 10/8/2024); Tenosynovectomy (Right, 10/8/2024); and Excision of mass of hand (Right, 10/8/2024).    Jayshree has a current medication list which includes the following prescription(s): celebrex, prolia, famotidine, nitrofurantoin, ondansetron, pantoprazole, intrarosa, tizanidine, tramadol, and valacyclovir.    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     Current FunctionalStatus   Home/Living environment : lives with their family      Limitation of Functional Status as follows:   ADLs/IADLs: Prior functional status of self care needs: was independent in feeding, dressing, grooming, leisure task                Current status of self care needs: Independent while protecting hand and wrist   Objective     Observation/Appearance:  Joint stiffness , arrived with right post op dressing, stitches removed       Sensation: Median Nerve Distribution   10/18/2024    Right   Monofilament Testing      Normal 1.65-2.83 Present   Diminished Light Touch 3.22-3.61 Present   Diminished Protective 3.84-4.31 Present   Loss of Protective 4.56-6.65 Present   Untestable >6.65 Present       Wound Assessment:   - removed bandages, stitches removed, non-draining         Range of Motion:   Right Active   10/18/2024   Long                          MP Ext/Flex 0/85                          PIP Ext/Flex 0/81                         DIP Ext/Flex 0/60   THUMB                          MP Ext/Flex 0/full                         IP Ext/Flex 0/full                         Carrera ABD 50                         Radial ABD 50                         Opposition To SF P1     Comments:      Wrist ROM: Right  Active   10/18/2024   Extension 55   Flexion 80   Radial Deviation full   Ulnar Deviation full   Supination full   Pronation full               Intake Outcome Measure for FOTO hand  Survey    Therapist reviewed FOTO scores for Jayshree Bee on 10/18/2024.   FOTO documents entered into [employer] - see Media section.           Treatment     Total Treatment time separate from Evaluation time: about 15 minutes after evaluation performed the following and went over HEP    Jayshree received the treatments listed below:     Supervised modalities after being cleared for contradictions:     Patient received paraffin bath to moist heat pack R hand(s) for 5 minutes to increase blood flow, circulation, pain management and for tissue elasticity prior to therex.     - went over HEP and removed stitches       We talked at length about the anatomy and pathophysiology of diagnosis , prevention of injury/re-injury, and answered all questions patients had.    Home Exercise Program/Education:  Issued HEP (see patient instructions in EMR) and educated on modality use for pain management . Exercises were reviewed and Jayshree was able to demonstrate them prior to the end of the session.   Pt received a written copy of exercises to perform at home. Jayshree demonstrated good  understanding of the education provided.  Pt was advised to perform these exercises free of pain, and to stop performing them if pain occurs.    Patient/Family Education: role of OT, goals for OT, double booking , scheduling/cancellations - pt verbalized understanding. Discussed insurance limitations with patient.    Additional Education provided: role of  CHT/OT, goals for CHT/OT, discussed insurance limitation with patient- patient verbalized understanding           Assessment     This 63 y.o. female referred to Outpatient Occupational Therapy with diagnosis of   Encounter Diagnoses   Name Primary?    Weakness Yes    Decreased range of motion     presents with limitations as described in problem list. Patient can benefit from Occupational Therapy services for Ultrasound, moist heat, AAROM, AROM, Theraputic exercises, joint mobs, home exercise program provied with written instructions, and strengthening and Orthosis, if deemed necessary . The following goals were discussed with the patient and she is in agreement with them as to be addressed in the treatment plan.   The patient's rehab potential is Good.     Problem List:   Decreased function of Right UE, Decreased ROM, Increased pain, Decreased strength, and Inability to perform work/tasks      Anticipated barriers to occupational therapy: healing   Pt has no cultural, educational or language barriers to learning provided.      Medical Necessity is demonstrated by the following  Occupational Profile/History  Co-morbidities and personal factors that may impact the plan of care [x] LOW: Brief chart review  [] MODERATE: Expanded chart review   [] HIGH: Extensive chart review    Moderate / High Support Documentation     Examination  Performance deficits relating to physical, cognitive or psychosocial skills that result in activity limitations and/or participation restrictions  [x] LOW: addressing 1-3 Performance deficits  [] MODERATE: 3-5 Performance deficits  [] HIGH: 5+ Performance deficits (please support below)    Moderate / High Support Documentation:    Physical:  Joint Mobility  Muscle Power/Strength  Pinch Strength  Fine Motor Coordination    Cognitive:  No Deficits    Psychosocial:    No Deficits  NO deficits      Treatment Options [x] LOW: Limited options  [] MODERATE: Several options  [] HIGH: Multiple  options      Decision Making/ Complexity Score: low             The following goals were discussed with the patient and patient is in agreement with them as to be addressed in the treatment plan.     Goals:   ST-8  weeks :  1) Patient to be IND with HEP and modalities for pain managment.  2) Patient will  Increase Active Range of motion 8-10 degrees in hand/wrist to increase functional hand use for ADLs/work/leisure activities.  3) Pt will  increase FOTO  intake score by 15 % to increase their hand/wrist functional ability.  4)Pt will increase  strength by 10-20 lbs. to improve functional grasp  for ADLs/work/leisure activities.   5) Pt will increase pinch strength in key and lateral limited positions by 1-3 psi to assist with manipulation and fine motor task    LTG:   Goals to be met in 8-10  weeks:    1) Patient to be IND with HEP and modalities for pain management.  2) Patient will  Increase Active Range of motion 15-20 degrees in hand/wrist to increase functional hand use for ADLs/work/leisure activities.  3)Pt will increase  strength 25-30 lbs. to improve functional grasp for ADLs/work/leisure activities.   4) Pt will increase pinch strength in key and lateral pinch  limited positions by 3-5 psi psi to assist with manipulation and fine motor task  5) Pt will increase  FOTO  intake score by 10 % to increase their hand/wrist functional ability .      Plan       Certification Period/Plan of care expiration: 10/18/2024 to 2024.    Outpatient Occupational Therapy 2 times weekly for 6 weeks to include the following interventions: Paraffin, Fluidotherapy, Manual therapy/joint mobilizations, Modalities for pain management, US 3 mhz, Therapeutic exercises/activities., Orthotic Fabrication/Fit/Training, Scar Management, Wound Care, and Joint Protection.      Sudha Flaherty, OT  Occupational therapist, Certified Hand Therapist

## 2024-10-22 ENCOUNTER — CLINICAL SUPPORT (OUTPATIENT)
Dept: REHABILITATION | Facility: HOSPITAL | Age: 64
End: 2024-10-22
Payer: COMMERCIAL

## 2024-10-22 DIAGNOSIS — M25.60 DECREASED RANGE OF MOTION: ICD-10-CM

## 2024-10-22 DIAGNOSIS — R53.1 WEAKNESS: Primary | ICD-10-CM

## 2024-10-22 PROCEDURE — 97110 THERAPEUTIC EXERCISES: CPT

## 2024-10-22 PROCEDURE — 97140 MANUAL THERAPY 1/> REGIONS: CPT

## 2024-10-22 PROCEDURE — 97530 THERAPEUTIC ACTIVITIES: CPT

## 2024-10-22 PROCEDURE — 97018 PARAFFIN BATH THERAPY: CPT

## 2024-10-22 NOTE — PROGRESS NOTES
"[facility]  Occupational Therapy Treatment Note    Date: 10/22/2024  Name: Jayshree Bee  [facility] Number: [id number]    Therapy Diagnosis:   Encounter Diagnoses   Name Primary?    Weakness Yes    Decreased range of motion      Physician: Halie Mata MD    Physician Orders: OT Eval and Treat  Medical Diagnosis: G56.01 (ICD-10-CM) - Right carpal tunnel syndrome; M65.331 (ICD-10-CM) - Trigger middle finger of right hand; M67.441 (ICD-10-CM) - Ganglion cyst of flexor tendon sheath of finger of right hand   Surgical Procedure and Date:   1. Right carpal tunnel release, cpt 77867  2. Right long finger flexor tenosynovectomy, cpt 51984  3. Ganglion cyst of tendon sheath excision right long finger, cpt 40841  4. Right long finger MP mass excision, cpt 57489, 10-8-2024   Evaluation Date: 10/18/2024   Insurance Authorization Period Expiration: 12/31/2024  Plan of Care Expiration: 1/18/2024  Date of Return to MD: 10-   Visit # / Visits authorized: 2 / 16  FOTO: 0/2 (did not take)     Precautions:  Standard    Time In: 2:00 pm  Time Out: 2:54 pm  Total Billable Time: 42 minutes    SUBJECTIVE     Pt reports: "I haven't had pain since before the surgery."   She was compliant with home exercise program given last session.   Response to previous treatment: First treatment  Functional change: increased light use of hand     Pain: 0/10  Location: right hand    OBJECTIVE   Objective Measures updated at progress report unless specified.    Observation/Appearance:  Joint stiffness     Sensation: Median Nerve Distribution    10/18/2024     Right   Monofilament Testing        Normal 1.65-2.83 Present   Diminished Light Touch 3.22-3.61 Present   Diminished Protective 3.84-4.31 Present   Loss of Protective 4.56-6.65 Present   Untestable >6.65 Present      Range of Motion:   Right Active    10/18/2024   Long                           MP Ext/Flex 0/85                         PIP Ext/Flex 0/81              "            DIP Ext/Flex 0/60   THUMB                           MP Ext/Flex 0/full                         IP Ext/Flex 0/full                         Carrera ABD 50                         Radial ABD 50                         Opposition To SF P1      Comments:     Wrist ROM: Right  Active    10/18/2024   Extension 55   Flexion 80   Radial Deviation full   Ulnar Deviation full   Supination full   Pronation full      Treatment     Jayshree received the treatments listed below:     Supervised modalities after being cleared for contradictions:   - Paraffin bath with moist heat pack to R hand(s) for 12 minutes to increase blood flow, circulation, pain management, and for tissue elasticity prior to therex.     Manual therapy techniques: Manual Lymphatic Drainage and Soft tissue Mobilization were applied to the: R hand for 10 minutes, including:  - Performed effleurage massage to fingers/hand/wrist to decrease edema, improve joint mobility, decrease pain and improve lymphatic drainage to increase hand function.   - Performed scar massage to decrease adhesions and improve tensile glide.     Therapeutic exercises to develop endurance, ROM, and flexibility for 24 minutes, including:  - DIP/PIP jt blocking to R LF x 15 reps each   - Wrist flex/ext, RD/UD x 10 reps each  - Finger add/abd, finger lifts x 15 reps   - Hook to full fist x 20 reps    Therapeutic activities to improve functional performance for 8 minutes, including:  - Towel scrunches x 2 min  - Octyi x 2 min  - Hook dowel roll with highlighter x 2 min   - Isospheres x 2 min    Patient Education and Home Exercises      Education provided:   - HEP in place  - Progress towards goals     Written Home Exercises Provided: Patient instructed to cont prior HEP.  Exercises were reviewed and Jayshree was able to demonstrate them prior to the end of the session.  Jayshree demonstrated good  understanding of the HEP provided. See EMR under Patient Instructions for exercises provided  during therapy sessions.      ASSESSMENT     Pt returns for her first follow-up visit this date. She participated well and endorses good adherence to HEP. Reports no pain. She is able to make a full comp fist. Will progress as tolerated and per protocol.      Jayshree is progressing well towards her goals and there are no updates to goals at this time. Pt prognosis is Good.     Pt will continue to benefit from skilled outpatient occupational therapy to address the deficits listed in the problem list on initial evaluation, provide pt/family education and to maximize pt's level of independence in the home and community environment.     Pt's spiritual, cultural and educational needs considered and pt agreeable to plan of care and goals.    Anticipated barriers to occupational therapy: healing    Goals:   ST-8  weeks :  1) Patient to be IND with HEP and modalities for pain managment. progressing not met 10/22/2024  2) Patient will  Increase Active Range of motion 8-10 degrees in hand/wrist to increase functional hand use for ADLs/work/leisure activities. progressing not met 10/22/2024  3) Pt will  increase FOTO  intake score by 15 % to increase their hand/wrist functional ability. progressing not met 10/22/2024  4)Pt will increase  strength by 10-20 lbs. to improve functional grasp  for ADLs/work/leisure activities.  progressing not met 10/22/2024  5) Pt will increase pinch strength in key and lateral limited positions by 1-3 psi to assist with manipulation and fine motor task progressing not met 10/22/2024     LTG:   Goals to be met in 8-10  weeks:     1) Patient to be IND with HEP and modalities for pain management. progressing not met 10/22/2024  2) Patient will  Increase Active Range of motion 15-20 degrees in hand/wrist to increase functional hand use for ADLs/work/leisure activities. progressing not met 10/22/2024  3)Pt will increase  strength 25-30 lbs. to improve functional grasp for ADLs/work/leisure  activities. progressing not met 10/22/2024  4) Pt will increase pinch strength in key and lateral pinch  limited positions by 3-5 psi psi to assist with manipulation and fine motor task. progressing not met 10/22/2024  5) Pt will increase  FOTO  intake score by 10 % to increase their hand/wrist functional ability. progressing not met 10/22/2024    PLAN     Continue skilled occupational therapy with individualized plan of care focusing on maximizing functional use of patient's right hand.    Updates/Grading for next session: progress as tolerated and per protocol.    Liz Holden, MOT, OTR/L, CHT

## 2024-10-29 ENCOUNTER — CLINICAL SUPPORT (OUTPATIENT)
Dept: REHABILITATION | Facility: HOSPITAL | Age: 64
End: 2024-10-29
Payer: COMMERCIAL

## 2024-10-29 DIAGNOSIS — M25.60 DECREASED RANGE OF MOTION: ICD-10-CM

## 2024-10-29 DIAGNOSIS — R53.1 WEAKNESS: Primary | ICD-10-CM

## 2024-10-29 PROCEDURE — 97530 THERAPEUTIC ACTIVITIES: CPT

## 2024-10-29 PROCEDURE — 97022 WHIRLPOOL THERAPY: CPT

## 2024-10-30 ENCOUNTER — OFFICE VISIT (OUTPATIENT)
Dept: ORTHOPEDICS | Facility: CLINIC | Age: 64
End: 2024-10-30
Payer: COMMERCIAL

## 2024-10-30 DIAGNOSIS — R22.31 FINGER MASS, RIGHT: ICD-10-CM

## 2024-10-30 DIAGNOSIS — M67.441 GANGLION CYST OF FLEXOR TENDON SHEATH OF FINGER OF RIGHT HAND: ICD-10-CM

## 2024-10-30 DIAGNOSIS — Z98.890 POSTOPERATIVE STATE: Primary | ICD-10-CM

## 2024-10-30 DIAGNOSIS — G56.01 RIGHT CARPAL TUNNEL SYNDROME: ICD-10-CM

## 2024-10-30 DIAGNOSIS — M65.331 TRIGGER MIDDLE FINGER OF RIGHT HAND: ICD-10-CM

## 2024-10-30 PROCEDURE — 99999 PR PBB SHADOW E&M-EST. PATIENT-LVL III: CPT | Mod: PBBFAC,,,

## 2024-10-31 ENCOUNTER — CLINICAL SUPPORT (OUTPATIENT)
Dept: REHABILITATION | Facility: HOSPITAL | Age: 64
End: 2024-10-31
Attending: ORTHOPAEDIC SURGERY
Payer: COMMERCIAL

## 2024-10-31 DIAGNOSIS — R53.1 WEAKNESS: Primary | ICD-10-CM

## 2024-10-31 DIAGNOSIS — M25.60 DECREASED RANGE OF MOTION: ICD-10-CM

## 2024-10-31 PROCEDURE — 97110 THERAPEUTIC EXERCISES: CPT

## 2024-10-31 PROCEDURE — 97530 THERAPEUTIC ACTIVITIES: CPT

## 2024-10-31 PROCEDURE — 97018 PARAFFIN BATH THERAPY: CPT

## 2024-11-04 ENCOUNTER — CLINICAL SUPPORT (OUTPATIENT)
Dept: REHABILITATION | Facility: HOSPITAL | Age: 64
End: 2024-11-04
Payer: COMMERCIAL

## 2024-11-04 DIAGNOSIS — R53.1 WEAKNESS: Primary | ICD-10-CM

## 2024-11-04 DIAGNOSIS — M25.60 DECREASED RANGE OF MOTION: ICD-10-CM

## 2024-11-04 PROCEDURE — 97110 THERAPEUTIC EXERCISES: CPT

## 2024-11-04 PROCEDURE — 97022 WHIRLPOOL THERAPY: CPT

## 2024-11-04 PROCEDURE — 97140 MANUAL THERAPY 1/> REGIONS: CPT

## 2024-11-04 PROCEDURE — 97530 THERAPEUTIC ACTIVITIES: CPT

## 2024-11-04 NOTE — PROGRESS NOTES
"[facility]  Occupational Therapy Treatment Note    Date: 11/4/2024  Name: Jayshree Bee  [facility] Number: [id number]    Therapy Diagnosis:   Encounter Diagnoses   Name Primary?    Weakness Yes    Decreased range of motion      Physician: Halie Mata MD    Physician Orders: OT Eval and Treat  Medical Diagnosis: G56.01 (ICD-10-CM) - Right carpal tunnel syndrome; M65.331 (ICD-10-CM) - Trigger middle finger of right hand; M67.441 (ICD-10-CM) - Ganglion cyst of flexor tendon sheath of finger of right hand   Surgical Procedure and Date:   1. Right carpal tunnel release, cpt 51330  2. Right long finger flexor tenosynovectomy, cpt 41573  3. Ganglion cyst of tendon sheath excision right long finger, cpt 05642  4. Right long finger MP mass excision, cpt 16574, 10-8-2024   Evaluation Date: 10/18/2024   Insurance Authorization Period Expiration: 12/31/2024  Plan of Care Expiration: 1/18/2024  Date of Return to MD: 12/11/2024   Visit # / Visits authorized: 4 / 16  FOTO: 0/2 (did not take)     Precautions:  Standard    Time In: 10:00 am  Time Out: 10:50 am  Total Billable Time: 50 minutes    SUBJECTIVE     Pt reports: "My middle finger is swollen in the morning."  She was compliant with home exercise program given last session.   Response to previous treatment: Good - improving motion   Functional change: able to wash dishes    Pain: 0/10  Location: right hand    OBJECTIVE   Objective Measures updated at progress report unless specified.    Observation/Appearance:  Joint stiffness     Sensation: Median Nerve Distribution    10/18/2024     Right   Monofilament Testing        Normal 1.65-2.83 Present   Diminished Light Touch 3.22-3.61 Present   Diminished Protective 3.84-4.31 Present   Loss of Protective 4.56-6.65 Present   Untestable >6.65 Present      Range of Motion:   Right Active    10/29/024   Long                           MP Ext/Flex 0/90 (+5)                         PIP Ext/Flex 0/100(+21)     "                     DIP Ext/Flex 0/60   THUMB                           MP Ext/Flex 0/full                         IP Ext/Flex 0/full                         Carrera ABD 50                         Radial ABD 50                         Opposition To SF P1      Comments:     Wrist ROM: Right  Active    10/29/2024   Extension 65(+10)   Flexion 80   Radial Deviation full   Ulnar Deviation full   Supination full   Pronation full      Treatment     Jayshree received the treatments listed below:     Supervised modalities after being cleared for contradictions:   - Patient received fluidotherapy to right hand(s) for 12 minutes to increase blood flow, circulation, desensitization, sensory re-education and for pain management. Pt instructed on performing active range of motion exercises while receiving heat to increase active motion.     Manual therapy techniques: Manual Lymphatic Drainage and Soft tissue Mobilization were applied to the: R hand for 10 minutes, including:  - Performed scar massage to decrease adhesions and improve tensile glide.   - myofascial static cupping X 3 minutes for loosening soft tissue, improve scar mobility on MP of LF and carpal tunnel scar.    Therapeutic exercises to develop endurance, ROM, and flexibility for 25 minutes, including:  - AROM in fluidotherapy x 12 min  - Wrist flex/ext, RD/UD x 10 reps each   - finger lifts x 15 reps   - Hook to full fist x 20 reps   - pad and dab x 1 minute  - Pt performed wrist flex, extension, supination and pronation with yellow flexbar x 1 minute each position to improve  strength and stabilization *NT    Therapeutic activities to improve functional performance for 15 minutes, including:  - DP of IF and LF presses for PIP extension x 15 reps  - Octyi x 2 min   - finger extension x 20 reps   - Hook dowel roll with marker x 2 min   - Isospheres x 2 min   - Issued digit edema sleeve size L to R LF for edema management, compression, and support. Pt educated on  purpose, wear, care, and precautions.   - Issued Mepitac for scar management. Pt educated on purpose, application, wear, care, and precautions.     Patient Education and Home Exercises      Education provided:   - HEP in place  - Progress towards goals     Written Home Exercises Provided: Patient instructed to cont prior HEP.  Exercises were reviewed and Jayshree was able to demonstrate them prior to the end of the session.  Jayshree demonstrated good  understanding of the HEP provided. See EMR under Patient Instructions for exercises provided during therapy sessions.      ASSESSMENT     Pt tolerated session well. Full fisting, has great motion, increased in wrist flexion and extension, full finger extension. Pt will be supplied putty at 6 weeks post surgery.    Jayshree is progressing well towards her goals and there are no updates to goals at this time. Pt prognosis is Good.     Pt will continue to benefit from skilled outpatient occupational therapy to address the deficits listed in the problem list on initial evaluation, provide pt/family education and to maximize pt's level of independence in the home and community environment.     Pt's spiritual, cultural and educational needs considered and pt agreeable to plan of care and goals.    Anticipated barriers to occupational therapy: healing    Goals:   ST-8  weeks :  1) Patient to be IND with HEP and modalities for pain managment. progressing not met 2024  2) Patient will  Increase Active Range of motion 8-10 degrees in hand/wrist to increase functional hand use for ADLs/work/leisure activities. progressing not met 2024  3) Pt will  increase FOTO  intake score by 15 % to increase their hand/wrist functional ability. progressing not met 2024  4)Pt will increase  strength by 10-20 lbs. to improve functional grasp  for ADLs/work/leisure activities.  progressing not met 2024  5) Pt will increase pinch strength in key and lateral limited positions by  1-3 psi to assist with manipulation and fine motor task progressing not met 11/4/2024     LTG:   Goals to be met in 8-10  weeks:     1) Patient to be IND with HEP and modalities for pain management. progressing not met 11/4/2024  2) Patient will  Increase Active Range of motion 15-20 degrees in hand/wrist to increase functional hand use for ADLs/work/leisure activities. progressing not met 11/4/2024  3)Pt will increase  strength 25-30 lbs. to improve functional grasp for ADLs/work/leisure activities. progressing not met 11/4/2024  4) Pt will increase pinch strength in key and lateral pinch  limited positions by 3-5 psi psi to assist with manipulation and fine motor task. progressing not met 11/4/2024  5) Pt will increase  FOTO  intake score by 10 % to increase their hand/wrist functional ability. progressing not met 11/4/2024    PLAN     Continue skilled occupational therapy with individualized plan of care focusing on maximizing functional use of patient's right hand.    Updates/Grading for next session: progress as tolerated and per protocol.

## 2024-11-07 ENCOUNTER — CLINICAL SUPPORT (OUTPATIENT)
Dept: REHABILITATION | Facility: HOSPITAL | Age: 64
End: 2024-11-07
Payer: COMMERCIAL

## 2024-11-07 DIAGNOSIS — R53.1 WEAKNESS: Primary | ICD-10-CM

## 2024-11-07 DIAGNOSIS — F51.01 PRIMARY INSOMNIA: ICD-10-CM

## 2024-11-07 DIAGNOSIS — M25.60 DECREASED RANGE OF MOTION: ICD-10-CM

## 2024-11-07 PROCEDURE — 97530 THERAPEUTIC ACTIVITIES: CPT

## 2024-11-07 PROCEDURE — 97022 WHIRLPOOL THERAPY: CPT

## 2024-11-07 PROCEDURE — 97140 MANUAL THERAPY 1/> REGIONS: CPT

## 2024-11-07 PROCEDURE — 97110 THERAPEUTIC EXERCISES: CPT

## 2024-11-07 RX ORDER — TIZANIDINE 2 MG/1
TABLET ORAL
Qty: 30 TABLET | Refills: 0 | Status: SHIPPED | OUTPATIENT
Start: 2024-11-07

## 2024-11-07 NOTE — TELEPHONE ENCOUNTER
No care due was identified.  [facility] Embedded Care Due Messages. Reference number: [id number].   11/07/2024 9:19:09 AM CST

## 2024-11-07 NOTE — PROGRESS NOTES
"[facility]  Occupational Therapy Treatment Note    Date: 11/7/2024  Name: Jayshree Bee  [facility] Number: [id number]    Therapy Diagnosis:   Encounter Diagnoses   Name Primary?    Weakness Yes    Decreased range of motion        Physician: Halie Mata MD    Physician Orders: OT Eval and Treat  Medical Diagnosis: G56.01 (ICD-10-CM) - Right carpal tunnel syndrome; M65.331 (ICD-10-CM) - Trigger middle finger of right hand; M67.441 (ICD-10-CM) - Ganglion cyst of flexor tendon sheath of finger of right hand   Surgical Procedure and Date:   1. Right carpal tunnel release, cpt 01147  2. Right long finger flexor tenosynovectomy, cpt 62670  3. Ganglion cyst of tendon sheath excision right long finger, cpt 33551  4. Right long finger MP mass excision, cpt 25837, 10-8-2024   Evaluation Date: 10/18/2024   Insurance Authorization Period Expiration: 12/31/2024  Plan of Care Expiration: 1/18/2024  Date of Return to MD: 12/11/2024   Visit # / Visits authorized: 5 / 16  FOTO: 0/2 (did not take)     Precautions:  Standard    Time In: 10:10 am  Time Out: 10:58 am  Total Billable Time: 48 minutes    SUBJECTIVE     Pt reports: "My middle finger is swollen in the morning."  She was compliant with home exercise program given last session.   Response to previous treatment: Good - improving motion   Functional change: able to wash dishes    Pain: 0/10  Location: right hand    OBJECTIVE   Objective Measures updated at progress report unless specified.    Observation/Appearance:  Joint stiffness     Sensation: Median Nerve Distribution    10/18/2024     Right   Monofilament Testing        Normal 1.65-2.83 Present   Diminished Light Touch 3.22-3.61 Present   Diminished Protective 3.84-4.31 Present   Loss of Protective 4.56-6.65 Present   Untestable >6.65 Present      Range of Motion:   Right Active    10/29/024   Long                           MP Ext/Flex 0/90 (+5)                         PIP Ext/Flex 0/100(+21) "                         DIP Ext/Flex 0/60   THUMB                           MP Ext/Flex 0/full                         IP Ext/Flex 0/full                         Carrera ABD 50                         Radial ABD 50                         Opposition To SF P1      Comments:     Wrist ROM: Right  Active    10/29/2024   Extension 65(+10)   Flexion 80   Radial Deviation full   Ulnar Deviation full   Supination full   Pronation full      Treatment     Jayshree received the treatments listed below:     Supervised modalities after being cleared for contradictions:   - Patient received fluidotherapy to right hand(s) for 15 minutes to increase blood flow, circulation, desensitization, sensory re-education and for pain management. Pt instructed on performing active range of motion exercises while receiving heat to increase active motion.     Manual therapy techniques: Manual Lymphatic Drainage and Soft tissue Mobilization were applied to the: R hand for 10 minutes, including:  - Performed scar massage to decrease adhesions and improve tensile glide.   - myofascial static cupping X 3 minutes for loosening soft tissue, improve scar mobility on MP of LF and carpal tunnel scar.    Therapeutic exercises to develop endurance, ROM, and flexibility for 26 minutes, including:  - AROM in fluidotherapy x 15 min  - Wrist flex/ext, RD/UD x 10 reps each   - finger lifts x 15 reps   - Hook to full fist x 20 reps   - pad and dab x 1 minute  - Pt performed wrist flex, extension, supination and pronation with red flexbar x 1 minute each position to improve  strength and stabilization     Therapeutic activities to improve functional performance for 12 minutes, including:  - DP of IF and LF presses for PIP extension x 15 reps  - Octyi x 2 min   - Flexbar rolling x 2 min for finger ext   - Golf ball rolling x 1 min  - Hook dowel roll with pencil x 2 min   - Issued Mepitac for scar management. Pt educated on purpose, application, wear, care, and  precautions.     Patient Education and Home Exercises      Education provided:   - HEP in place  - Progress towards goals     Written Home Exercises Provided: Patient instructed to cont prior HEP.  Exercises were reviewed and Jayshree was able to demonstrate them prior to the end of the session.  Jayshree demonstrated good  understanding of the HEP provided. See EMR under Patient Instructions for exercises provided during therapy sessions.      ASSESSMENT     Pt tolerated session well. Full fisting, has great motion, increased in wrist flexion and extension, full finger extension. Pt will be supplied putty at 6 weeks post surgery.    Jayshree is progressing well towards her goals and there are no updates to goals at this time. Pt prognosis is Good.     Pt will continue to benefit from skilled outpatient occupational therapy to address the deficits listed in the problem list on initial evaluation, provide pt/family education and to maximize pt's level of independence in the home and community environment.     Pt's spiritual, cultural and educational needs considered and pt agreeable to plan of care and goals.    Anticipated barriers to occupational therapy: healing    Goals:   ST-8  weeks :  1) Patient to be IND with HEP and modalities for pain managment. progressing not met 2024  2) Patient will  Increase Active Range of motion 8-10 degrees in hand/wrist to increase functional hand use for ADLs/work/leisure activities. progressing not met 2024  3) Pt will  increase FOTO  intake score by 15 % to increase their hand/wrist functional ability. progressing not met 2024  4)Pt will increase  strength by 10-20 lbs. to improve functional grasp  for ADLs/work/leisure activities.  progressing not met 2024  5) Pt will increase pinch strength in key and lateral limited positions by 1-3 psi to assist with manipulation and fine motor task progressing not met 2024     LTG:   Goals to be met in 8-10  weeks:      1) Patient to be IND with HEP and modalities for pain management. progressing not met 11/7/2024  2) Patient will  Increase Active Range of motion 15-20 degrees in hand/wrist to increase functional hand use for ADLs/work/leisure activities. progressing not met 11/7/2024  3)Pt will increase  strength 25-30 lbs. to improve functional grasp for ADLs/work/leisure activities. progressing not met 11/7/2024  4) Pt will increase pinch strength in key and lateral pinch  limited positions by 3-5 psi psi to assist with manipulation and fine motor task. progressing not met 11/7/2024  5) Pt will increase  FOTO  intake score by 10 % to increase their hand/wrist functional ability. progressing not met 11/7/2024    PLAN     Continue skilled occupational therapy with individualized plan of care focusing on maximizing functional use of patient's right hand.    Updates/Grading for next session: progress as tolerated and per protocol.

## 2024-11-12 ENCOUNTER — CLINICAL SUPPORT (OUTPATIENT)
Dept: REHABILITATION | Facility: HOSPITAL | Age: 64
End: 2024-11-12
Payer: COMMERCIAL

## 2024-11-12 DIAGNOSIS — M25.60 DECREASED RANGE OF MOTION: ICD-10-CM

## 2024-11-12 DIAGNOSIS — R53.1 WEAKNESS: Primary | ICD-10-CM

## 2024-11-12 PROCEDURE — 97530 THERAPEUTIC ACTIVITIES: CPT

## 2024-11-12 PROCEDURE — 97140 MANUAL THERAPY 1/> REGIONS: CPT

## 2024-11-12 PROCEDURE — 97022 WHIRLPOOL THERAPY: CPT

## 2024-11-12 PROCEDURE — 97110 THERAPEUTIC EXERCISES: CPT

## 2024-11-12 NOTE — PROGRESS NOTES
"[facility]  Occupational Therapy Treatment Note    Date: 11/12/2024  Name: Jayshree Bee  [facility] Number: [id number]    Therapy Diagnosis:   Encounter Diagnoses   Name Primary?    Weakness Yes    Decreased range of motion          Physician: Halie Mata MD    Physician Orders: OT Eval and Treat  Medical Diagnosis: G56.01 (ICD-10-CM) - Right carpal tunnel syndrome; M65.331 (ICD-10-CM) - Trigger middle finger of right hand; M67.441 (ICD-10-CM) - Ganglion cyst of flexor tendon sheath of finger of right hand   Surgical Procedure and Date:   1. Right carpal tunnel release, cpt 46112  2. Right long finger flexor tenosynovectomy, cpt 65793  3. Ganglion cyst of tendon sheath excision right long finger, cpt 53763  4. Right long finger MP mass excision, cpt 42710, 10-8-2024   Evaluation Date: 10/18/2024   Insurance Authorization Period Expiration: 12/31/2024  Plan of Care Expiration: 1/18/2024  Date of Return to MD: 12/11/2024   Visit # / Visits authorized: 6 / 16  FOTO: 0/2 (did not take)     Precautions:  Standard    Time In: 9:00 am  Time Out: 9:48 am  Total Billable Time: 48 minutes    SUBJECTIVE     Pt reports: "My middle finger is swollen in the morning."  She was compliant with home exercise program given last session.   Response to previous treatment: Good - improving motion   Functional change: able to wash dishes    Pain: 0/10  Location: right hand    OBJECTIVE   Objective Measures updated at progress report unless specified.    Observation/Appearance:  Joint stiffness     Sensation: Median Nerve Distribution    10/18/2024     Right   Monofilament Testing        Normal 1.65-2.83 Present   Diminished Light Touch 3.22-3.61 Present   Diminished Protective 3.84-4.31 Present   Loss of Protective 4.56-6.65 Present   Untestable >6.65 Present      Range of Motion:   Right Active    10/29/024   Long                           MP Ext/Flex 0/90 (+5)                         PIP Ext/Flex 0/100(+21) "                         DIP Ext/Flex 0/60   THUMB                           MP Ext/Flex 0/full                         IP Ext/Flex 0/full                         Carrera ABD 50                         Radial ABD 50                         Opposition To SF P1      Comments:     Wrist ROM: Right  Active    10/29/2024   Extension 65(+10)   Flexion 80   Radial Deviation full   Ulnar Deviation full   Supination full   Pronation full      Treatment     Jayshree received the treatments listed below:     Supervised modalities after being cleared for contradictions:   - Patient received fluidotherapy to right hand(s) for 12 minutes to increase blood flow, circulation, desensitization, sensory re-education and for pain management. Pt instructed on performing active range of motion exercises while receiving heat to increase active motion.     Manual therapy techniques: Manual Lymphatic Drainage and Soft tissue Mobilization were applied to the: R hand for 10 minutes, including:  - Performed scar massage to decrease adhesions and improve tensile glide.   - myofascial static cupping X 3 minutes for loosening soft tissue, improve scar mobility on MP of LF and carpal tunnel scar.     Therapeutic exercises to develop endurance, ROM, and flexibility for 26 minutes, including:  - AROM in fluidotherapy x 12 min  - Wrist flex/ext, RD/UD x 10 reps each   - finger lifts x 15 reps   - Hook to full fist x 20 reps   - pad and dab x 1 minute   - Pt performed wrist flex, extension, supination and pronation with red flexbar x 15 reps each position to improve  strength and stabilization     Therapeutic activities to improve functional performance for 12 minutes, including:  - Octyi x 2 min   - Golf ball rolling x 2 min   - Hook dowel roll with pencil x 2 min   - Issued Mepitac for scar management.   - Issued digit edema sleeve size L to R LF for edema management, compression, and support. Pt educated on purpose, wear, care, and precautions.   -  Issued new LMB dynamic spring ext splint size B     Patient Education and Home Exercises      Education provided:   - HEP in place  - Progress towards goals     Written Home Exercises Provided: Patient instructed to cont prior HEP.  Exercises were reviewed and Jayshree was able to demonstrate them prior to the end of the session.  Jayshree demonstrated good  understanding of the HEP provided. See EMR under Patient Instructions for exercises provided during therapy sessions.      ASSESSMENT     Pt tolerated session well. Full fisting, has great motion, increased in wrist flexion and extension, full finger extension.    Jayshree is progressing well towards her goals and there are no updates to goals at this time. Pt prognosis is Good.     Pt will continue to benefit from skilled outpatient occupational therapy to address the deficits listed in the problem list on initial evaluation, provide pt/family education and to maximize pt's level of independence in the home and community environment.     Pt's spiritual, cultural and educational needs considered and pt agreeable to plan of care and goals.    Anticipated barriers to occupational therapy: healing    Goals:   ST-8  weeks :  1) Patient to be IND with HEP and modalities for pain managment. progressing not met 2024  2) Patient will  Increase Active Range of motion 8-10 degrees in hand/wrist to increase functional hand use for ADLs/work/leisure activities. progressing not met 2024  3) Pt will  increase FOTO  intake score by 15 % to increase their hand/wrist functional ability. progressing not met 2024  4)Pt will increase  strength by 10-20 lbs. to improve functional grasp  for ADLs/work/leisure activities.  progressing not met 2024  5) Pt will increase pinch strength in key and lateral limited positions by 1-3 psi to assist with manipulation and fine motor task progressing not met 2024     LTG:   Goals to be met in 8-10  weeks:     1)  Patient to be IND with HEP and modalities for pain management. progressing not met 11/12/2024  2) Patient will  Increase Active Range of motion 15-20 degrees in hand/wrist to increase functional hand use for ADLs/work/leisure activities. progressing not met 11/12/2024  3)Pt will increase  strength 25-30 lbs. to improve functional grasp for ADLs/work/leisure activities. progressing not met 11/12/2024  4) Pt will increase pinch strength in key and lateral pinch  limited positions by 3-5 psi psi to assist with manipulation and fine motor task. progressing not met 11/12/2024  5) Pt will increase  FOTO  intake score by 10 % to increase their hand/wrist functional ability. progressing not met 11/12/2024    PLAN     Continue skilled occupational therapy with individualized plan of care focusing on maximizing functional use of patient's right hand.    Updates/Grading for next session: progress as tolerated and per protocol.

## 2024-11-14 ENCOUNTER — CLINICAL SUPPORT (OUTPATIENT)
Dept: REHABILITATION | Facility: HOSPITAL | Age: 64
End: 2024-11-14
Payer: COMMERCIAL

## 2024-11-14 DIAGNOSIS — R53.1 WEAKNESS: Primary | ICD-10-CM

## 2024-11-14 DIAGNOSIS — M25.60 DECREASED RANGE OF MOTION: ICD-10-CM

## 2024-11-14 PROCEDURE — 97530 THERAPEUTIC ACTIVITIES: CPT

## 2024-11-14 PROCEDURE — 97140 MANUAL THERAPY 1/> REGIONS: CPT

## 2024-11-14 PROCEDURE — 97022 WHIRLPOOL THERAPY: CPT

## 2024-11-14 PROCEDURE — 97110 THERAPEUTIC EXERCISES: CPT

## 2024-11-14 NOTE — PROGRESS NOTES
[facility]  Occupational Therapy Treatment Note    Date: 11/14/2024  Name: Jayshree Bee  [facility] Number: [id number]    Therapy Diagnosis:   Encounter Diagnoses   Name Primary?    Weakness Yes    Decreased range of motion      Physician: Halie Mata MD    Physician Orders: OT Eval and Treat  Medical Diagnosis: G56.01 (ICD-10-CM) - Right carpal tunnel syndrome; M65.331 (ICD-10-CM) - Trigger middle finger of right hand; M67.441 (ICD-10-CM) - Ganglion cyst of flexor tendon sheath of finger of right hand   Surgical Procedure and Date:   1. Right carpal tunnel release, cpt 73829  2. Right long finger flexor tenosynovectomy, cpt 98790  3. Ganglion cyst of tendon sheath excision right long finger, cpt 06364  4. Right long finger MP mass excision, cpt 82654, 10-8-2024   Evaluation Date: 10/18/2024   Insurance Authorization Period Expiration: 12/31/2024  Plan of Care Expiration: 1/18/2024  Date of Return to MD: 12/11/2024   Visit # / Visits authorized: 7 / 16  FOTO: 0/2 (did not take)     Precautions:  Standard    Time In: 9:04 am  Time Out: 9:39 am  Total Billable Time: 35 minutes    SUBJECTIVE     Pt reports: Scar tape and compression sleeve helping.   She was compliant with home exercise program given last session.   Response to previous treatment: Good - improving motion   Functional change: able to wash dishes    Pain: 0/10  Location: right hand    OBJECTIVE   Objective Measures updated at progress report unless specified.    Observation/Appearance:  Joint stiffness     Sensation: Median Nerve Distribution    10/18/2024     Right   Monofilament Testing        Normal 1.65-2.83 Present   Diminished Light Touch 3.22-3.61 Present   Diminished Protective 3.84-4.31 Present   Loss of Protective 4.56-6.65 Present   Untestable >6.65 Present      Range of Motion:   Right Active    10/29/024   Long                           MP Ext/Flex 0/90 (+5)                         PIP Ext/Flex 0/100(+21)                          DIP Ext/Flex 0/60   THUMB                           MP Ext/Flex 0/full                         IP Ext/Flex 0/full                         Carrera ABD 50                         Radial ABD 50                         Opposition To SF P1      Comments:     Wrist ROM: Right  Active    10/29/2024   Extension 65(+10)   Flexion 80   Radial Deviation full   Ulnar Deviation full   Supination full   Pronation full      Treatment     Jayshree received the treatments listed below:     Supervised modalities after being cleared for contradictions:   - Patient received fluidotherapy to right hand(s) for 12 minutes to increase blood flow, circulation, desensitization, sensory re-education and for pain management. Pt instructed on performing active range of motion exercises while receiving heat to increase active motion.     Manual therapy techniques: Manual Lymphatic Drainage and Soft tissue Mobilization were applied to the: R hand for 10 minutes, including:  - Performed scar massage to decrease adhesions and improve tensile glide.   - myofascial static cupping X 2 minutes for loosening soft tissue, improve scar mobility on MP of LF and carpal tunnel scar.     Therapeutic exercises to develop endurance, ROM, and flexibility for 17 minutes, including:  - AROM in fluidotherapy x 12 min  - Wrist flex/ext x 10 reps each with PROM  - finger lifts x 15 reps   - Hook to full fist x 15 reps   - pad and dab x 1 minute    - Pt performed wrist flex, extension, supination and pronation with red flexbar x 15 reps each position to improve  strength and stabilization *NT    Therapeutic activities to improve functional performance for 8 minutes, including:  - Octyi x 2 min   - Golf ball rolling x 2 min   - Hook dowel roll with pencil x 2 min   - Issued Mepitac for scar management.     Patient Education and Home Exercises      Education provided:   - HEP in place  - Progress towards goals     Written Home Exercises Provided: Patient  instructed to cont prior HEP.  Exercises were reviewed and Jayshree was able to demonstrate them prior to the end of the session.  Jayshree demonstrated good  understanding of the HEP provided. See EMR under Patient Instructions for exercises provided during therapy sessions.      ASSESSMENT     Pt tolerated session well. Full fisting, has great motion, increased in wrist flexion and extension, full finger extension.    Jayshree is progressing well towards her goals and there are no updates to goals at this time. Pt prognosis is Good.     Pt will continue to benefit from skilled outpatient occupational therapy to address the deficits listed in the problem list on initial evaluation, provide pt/family education and to maximize pt's level of independence in the home and community environment.     Pt's spiritual, cultural and educational needs considered and pt agreeable to plan of care and goals.    Anticipated barriers to occupational therapy: healing    Goals:   ST-8  weeks :  1) Patient to be IND with HEP and modalities for pain managment. progressing not met 2024  2) Patient will  Increase Active Range of motion 8-10 degrees in hand/wrist to increase functional hand use for ADLs/work/leisure activities. progressing not met 2024  3) Pt will  increase FOTO  intake score by 15 % to increase their hand/wrist functional ability. progressing not met 2024  4)Pt will increase  strength by 10-20 lbs. to improve functional grasp  for ADLs/work/leisure activities.  progressing not met 2024  5) Pt will increase pinch strength in key and lateral limited positions by 1-3 psi to assist with manipulation and fine motor task progressing not met 2024     LTG:   Goals to be met in 8-10  weeks:     1) Patient to be IND with HEP and modalities for pain management. progressing not met 2024  2) Patient will  Increase Active Range of motion 15-20 degrees in hand/wrist to increase functional hand use for  ADLs/work/leisure activities. progressing not met 11/14/2024  3)Pt will increase  strength 25-30 lbs. to improve functional grasp for ADLs/work/leisure activities. progressing not met 11/14/2024  4) Pt will increase pinch strength in key and lateral pinch  limited positions by 3-5 psi psi to assist with manipulation and fine motor task. progressing not met 11/14/2024  5) Pt will increase  FOTO  intake score by 10 % to increase their hand/wrist functional ability. progressing not met 11/14/2024    PLAN     Continue skilled occupational therapy with individualized plan of care focusing on maximizing functional use of patient's right hand.    Updates/Grading for next session: progress as tolerated and per protocol.

## 2024-11-19 ENCOUNTER — CLINICAL SUPPORT (OUTPATIENT)
Dept: REHABILITATION | Facility: HOSPITAL | Age: 64
End: 2024-11-19
Payer: COMMERCIAL

## 2024-11-19 DIAGNOSIS — R53.1 WEAKNESS: Primary | ICD-10-CM

## 2024-11-19 DIAGNOSIS — M25.60 DECREASED RANGE OF MOTION: ICD-10-CM

## 2024-11-19 PROCEDURE — 97110 THERAPEUTIC EXERCISES: CPT

## 2024-11-19 PROCEDURE — 97022 WHIRLPOOL THERAPY: CPT

## 2024-11-19 PROCEDURE — 97530 THERAPEUTIC ACTIVITIES: CPT

## 2024-11-19 NOTE — PROGRESS NOTES
[facility]  Occupational Therapy Treatment Note    Date: 11/19/2024  Name: Jayshree Bee  [facility] Number: [id number]    Therapy Diagnosis:   Encounter Diagnoses   Name Primary?    Weakness Yes    Decreased range of motion      Physician: Halie Mata MD    Physician Orders: OT Eval and Treat  Medical Diagnosis: G56.01 (ICD-10-CM) - Right carpal tunnel syndrome; M65.331 (ICD-10-CM) - Trigger middle finger of right hand; M67.441 (ICD-10-CM) - Ganglion cyst of flexor tendon sheath of finger of right hand   Surgical Procedure and Date:   1. Right carpal tunnel release, cpt 46239  2. Right long finger flexor tenosynovectomy, cpt 89768  3. Ganglion cyst of tendon sheath excision right long finger, cpt 49087  4. Right long finger MP mass excision, cpt 15010, 10-8-2024   Evaluation Date: 10/18/2024   Insurance Authorization Period Expiration: 12/31/2024  Plan of Care Expiration: 1/18/2024  Date of Return to MD: 12/11/2024   Visit # / Visits authorized: 8 / 16  FOTO: 0/2 (did not take)     Precautions:  Standard    Time In: 9:02 am  Time Out: 9:50 am  Total Billable Time: 28 minutes    SUBJECTIVE     Pt reports: Scar tape and compression sleeve helping.   She was compliant with home exercise program given last session.   Response to previous treatment: Good - improving motion   Functional change: able to wash dishes    Pain: 0/10  Location: right hand    OBJECTIVE   Objective Measures updated at progress report unless specified.    Observation/Appearance:  Joint stiffness     Sensation: Median Nerve Distribution    10/18/2024     Right   Monofilament Testing        Normal 1.65-2.83 Present   Diminished Light Touch 3.22-3.61 Present   Diminished Protective 3.84-4.31 Present   Loss of Protective 4.56-6.65 Present   Untestable >6.65 Present      Range of Motion:   Right Active    10/29/024   Long                           MP Ext/Flex 0/90 (+5)                         PIP Ext/Flex 0/100(+21)                          DIP Ext/Flex 0/60   THUMB                           MP Ext/Flex 0/full                         IP Ext/Flex 0/full                         Carrera ABD 50                         Radial ABD 50                         Opposition To SF P1      Comments:     Wrist ROM: Right  Active    10/29/2024   Extension 65(+10)   Flexion 80   Radial Deviation full   Ulnar Deviation full   Supination full   Pronation full       Strength (Dynamometer) and Pinch Strength (Pinch Gauge)  Measured in pounds to POP.   11/19/2024 11/19/2024    Left Right   Rung II 48 36   3pt Pinch 7 7     Treatment     Jayshree received the treatments listed below:     Supervised modalities after being cleared for contradictions:   - Patient received fluidotherapy to right hand(s) for 12 minutes to increase blood flow, circulation, desensitization, sensory re-education and for pain management. Pt instructed on performing active range of motion exercises while receiving heat to increase active motion.     Manual therapy techniques: Manual Lymphatic Drainage and Soft tissue Mobilization were applied to the: R hand for 10 minutes, including:   - Performed scar massage to decrease adhesions and improve tensile glide.   - myofascial static cupping X 2 minutes for loosening soft tissue, improve scar mobility on MP of LF and carpal tunnel scar.     Therapeutic exercises to develop endurance, ROM, and flexibility for 28 minutes, including:  - Updated objective measurements, see above.   - AROM in fluidotherapy x 12 min  - Wrist flex/ext x 10 reps each with PROM   - finger lifts x 15 reps   - Hook to full fist x 15 reps  - pad and dab x 15 reps    - Pt performed wrist flex, extension, supination and pronation, RD/UD with green flexbar x 15 reps each position to improve  strength and stabilization     Therapeutic activities to improve functional performance for 10 minutes, including:  - Octyi x 2 min   - Golf ball rolling x 2 min  - Hook dowel  roll with pencil x 2 min   - Power web, red: finger pulls 2 x 20 reps, pushes and pulls x 20 reps  - Issued digit edema sleeve size XL     Patient Education and Home Exercises      Education provided:   - HEP in place  - Progress towards goals     Written Home Exercises Provided: Patient instructed to cont prior HEP.  Exercises were reviewed and Jayshree was able to demonstrate them prior to the end of the session.  Jayshree demonstrated good  understanding of the HEP provided. See EMR under Patient Instructions for exercises provided during therapy sessions.      ASSESSMENT     Pt tolerated session well including light strengthening. Full fisting, has great motion, increased in wrist flexion and extension, full finger extension. Min deficits in strength.    Jayshree is progressing well towards her goals and there are no updates to goals at this time. Pt prognosis is Good.     Pt will continue to benefit from skilled outpatient occupational therapy to address the deficits listed in the problem list on initial evaluation, provide pt/family education and to maximize pt's level of independence in the home and community environment.     Pt's spiritual, cultural and educational needs considered and pt agreeable to plan of care and goals.    Anticipated barriers to occupational therapy: healing    Goals:   ST-8  weeks :  1) Patient to be IND with HEP and modalities for pain managment. progressing not met 2024  2) Patient will  Increase Active Range of motion 8-10 degrees in hand/wrist to increase functional hand use for ADLs/work/leisure activities. progressing not met 2024  3) Pt will  increase FOTO  intake score by 15 % to increase their hand/wrist functional ability. progressing not met 2024  4)Pt will increase  strength by 10-20 lbs. to improve functional grasp  for ADLs/work/leisure activities.  progressing not met 2024  5) Pt will increase pinch strength in key and lateral limited positions by  1-3 psi to assist with manipulation and fine motor task progressing not met 11/19/2024     LTG:   Goals to be met in 8-10  weeks:     1) Patient to be IND with HEP and modalities for pain management. progressing not met 11/19/2024  2) Patient will  Increase Active Range of motion 15-20 degrees in hand/wrist to increase functional hand use for ADLs/work/leisure activities. progressing not met 11/19/2024  3)Pt will increase  strength 25-30 lbs. to improve functional grasp for ADLs/work/leisure activities. progressing not met 11/19/2024  4) Pt will increase pinch strength in key and lateral pinch  limited positions by 3-5 psi psi to assist with manipulation and fine motor task. progressing not met 11/19/2024  5) Pt will increase  FOTO  intake score by 10 % to increase their hand/wrist functional ability. progressing not met 11/19/2024    PLAN     Continue skilled occupational therapy with individualized plan of care focusing on maximizing functional use of patient's right hand.    Updates/Grading for next session: progress as tolerated and per protocol. Issue putty.

## 2024-11-21 ENCOUNTER — CLINICAL SUPPORT (OUTPATIENT)
Dept: REHABILITATION | Facility: HOSPITAL | Age: 64
End: 2024-11-21
Payer: COMMERCIAL

## 2024-11-21 DIAGNOSIS — R53.1 WEAKNESS: Primary | ICD-10-CM

## 2024-11-21 DIAGNOSIS — M25.60 DECREASED RANGE OF MOTION: ICD-10-CM

## 2024-11-21 PROCEDURE — 97140 MANUAL THERAPY 1/> REGIONS: CPT

## 2024-11-21 PROCEDURE — 97022 WHIRLPOOL THERAPY: CPT

## 2024-11-21 PROCEDURE — 97110 THERAPEUTIC EXERCISES: CPT

## 2024-11-21 PROCEDURE — 97530 THERAPEUTIC ACTIVITIES: CPT

## 2024-11-21 NOTE — PATIENT INSTRUCTIONS
[facility]  OCCUPATIONAL THERAPY  HOME EXERCISE PROGRAM     Complete the following strengthening program 1-2x/day.         2 min                            2 x 10 reps                    Copyright © I. All rights reserved.

## 2024-11-21 NOTE — PROGRESS NOTES
[facility]  Occupational Therapy Treatment Note    Date: 11/21/2024  Name: Jayshree Bee  [facility] Number: [id number]    Therapy Diagnosis:   Encounter Diagnoses   Name Primary?    Weakness Yes    Decreased range of motion      Physician: Halie Mata MD    Physician Orders: OT Eval and Treat  Medical Diagnosis: G56.01 (ICD-10-CM) - Right carpal tunnel syndrome; M65.331 (ICD-10-CM) - Trigger middle finger of right hand; M67.441 (ICD-10-CM) - Ganglion cyst of flexor tendon sheath of finger of right hand   Surgical Procedure and Date:   1. Right carpal tunnel release, cpt 55383  2. Right long finger flexor tenosynovectomy, cpt 18171  3. Ganglion cyst of tendon sheath excision right long finger, cpt 87338  4. Right long finger MP mass excision, cpt 14991, 10-8-2024   Evaluation Date: 10/18/2024   Insurance Authorization Period Expiration: 12/31/2024  Plan of Care Expiration: 1/18/2024  Date of Return to MD: 12/11/2024   Visit # / Visits authorized: 9 / 16  FOTO: 0/2 (did not take)     Precautions:  Standard    Time In: 9:00 am  Time Out: 9:47 am  Total Billable Time: 47 minutes    SUBJECTIVE     Pt reports: Scar tape and compression sleeve helping.   She was compliant with home exercise program given last session.   Response to previous treatment: Good - improving motion   Functional change: able to wash dishes    Pain: 0/10  Location: right hand    OBJECTIVE   Objective Measures updated at progress report unless specified.    Observation/Appearance:  Joint stiffness     Sensation: Median Nerve Distribution    10/18/2024     Right   Monofilament Testing        Normal 1.65-2.83 Present   Diminished Light Touch 3.22-3.61 Present   Diminished Protective 3.84-4.31 Present   Loss of Protective 4.56-6.65 Present   Untestable >6.65 Present      Range of Motion:   Right Active    10/29/024   Long                           MP Ext/Flex 0/90 (+5)                         PIP Ext/Flex 0/100(+21)                          DIP Ext/Flex 0/60   THUMB                           MP Ext/Flex 0/full                         IP Ext/Flex 0/full                         Carrera ABD 50                         Radial ABD 50                         Opposition To SF P1      Comments:     Wrist ROM: Right  Active    10/29/2024   Extension 65(+10)   Flexion 80   Radial Deviation full   Ulnar Deviation full   Supination full   Pronation full       Strength (Dynamometer) and Pinch Strength (Pinch Gauge)  Measured in pounds to POP.   11/19/2024 11/19/2024    Left Right   Rung II 48 36   3pt Pinch 7 7     Treatment     Jayshree received the treatments listed below:     Supervised modalities after being cleared for contradictions:   - Patient received fluidotherapy to right hand(s) for 12 minutes to increase blood flow, circulation, desensitization, sensory re-education and for pain management. Pt instructed on performing active range of motion exercises while receiving heat to increase active motion.     Manual therapy techniques: Manual Lymphatic Drainage and Soft tissue Mobilization were applied to the: R hand for 10 minutes, including:   - Performed scar massage to decrease adhesions and improve tensile glide.   - myofascial static cupping X 2 minutes for loosening soft tissue, improve scar mobility on MP of LF and carpal tunnel scar.     Therapeutic exercises to develop endurance, ROM, and flexibility for 25 minutes, including:.   - AROM in fluidotherapy x 12 min  - Digit PROM x 2 min  - Wrist flex/ext x 10 reps each with PROM   - finger lifts x 15 reps   - Hook to full fist x 15 reps   - pad and dab x 15 reps    - Theraputty HEP, yellow: gripping x 2 min, tripod pinch x 2 logs   - EDC ext with yellow band 2 x 10 reps   - Pt performed wrist flex, extension, supination and pronation, RD/UD with green flexbar x 15 reps each position to improve  strength and stabilization *NT    Therapeutic activities to improve functional  performance for 12 minutes, including:  - Octyi x 2 min   - Golf ball rolling x 2 min   - Hook dowel roll with pencil x 2 min    - Power web, red: finger pulls 2 x 20 reps, pushes and pulls x 20 reps   - Issued digit edema sleeve size XL and mepitac for scar management     Patient Education and Home Exercises      Education provided:   - HEP in place, added: putty strengthening and resisted EDC  - Progress towards goals     Written Home Exercises Provided: yes.  Exercises were reviewed and Jayshree was able to demonstrate them prior to the end of the session.  Jayshree demonstrated good  understanding of the HEP provided. See EMR under Patient Instructions for exercises provided during therapy sessions.      ASSESSMENT     Pt tolerated session well including light strengthening. Full fisting, has great motion, increased in wrist flexion and extension, full finger extension. Min deficits in strength.    Jayshree is progressing well towards her goals and there are no updates to goals at this time. Pt prognosis is Good.     Pt will continue to benefit from skilled outpatient occupational therapy to address the deficits listed in the problem list on initial evaluation, provide pt/family education and to maximize pt's level of independence in the home and community environment.     Pt's spiritual, cultural and educational needs considered and pt agreeable to plan of care and goals.    Anticipated barriers to occupational therapy: healing    Goals:   ST-8  weeks :  1) Patient to be IND with HEP and modalities for pain managment. progressing not met 2024  2) Patient will  Increase Active Range of motion 8-10 degrees in hand/wrist to increase functional hand use for ADLs/work/leisure activities. progressing not met 2024  3) Pt will  increase FOTO  intake score by 15 % to increase their hand/wrist functional ability. progressing not met 2024  4)Pt will increase  strength by 10-20 lbs. to improve functional  grasp  for ADLs/work/leisure activities.  progressing not met 11/21/2024  5) Pt will increase pinch strength in key and lateral limited positions by 1-3 psi to assist with manipulation and fine motor task progressing not met 11/21/2024     LTG:   Goals to be met in 8-10  weeks:     1) Patient to be IND with HEP and modalities for pain management. progressing not met 11/21/2024  2) Patient will  Increase Active Range of motion 15-20 degrees in hand/wrist to increase functional hand use for ADLs/work/leisure activities. progressing not met 11/21/2024  3)Pt will increase  strength 25-30 lbs. to improve functional grasp for ADLs/work/leisure activities. progressing not met 11/21/2024  4) Pt will increase pinch strength in key and lateral pinch  limited positions by 3-5 psi psi to assist with manipulation and fine motor task. progressing not met 11/21/2024  5) Pt will increase  FOTO  intake score by 10 % to increase their hand/wrist functional ability. progressing not met 11/21/2024    PLAN     Continue skilled occupational therapy with individualized plan of care focusing on maximizing functional use of patient's right hand.    Updates/Grading for next session: progress as tolerated and per protocol.

## 2024-11-25 ENCOUNTER — CLINICAL SUPPORT (OUTPATIENT)
Dept: REHABILITATION | Facility: HOSPITAL | Age: 64
End: 2024-11-25
Payer: COMMERCIAL

## 2024-11-25 DIAGNOSIS — M25.60 DECREASED RANGE OF MOTION: ICD-10-CM

## 2024-11-25 DIAGNOSIS — R53.1 WEAKNESS: Primary | ICD-10-CM

## 2024-11-25 PROCEDURE — 97140 MANUAL THERAPY 1/> REGIONS: CPT

## 2024-11-25 PROCEDURE — 97530 THERAPEUTIC ACTIVITIES: CPT

## 2024-11-25 PROCEDURE — 97022 WHIRLPOOL THERAPY: CPT

## 2024-11-25 PROCEDURE — 97110 THERAPEUTIC EXERCISES: CPT

## 2024-11-25 NOTE — PROGRESS NOTES
[facility]  Occupational Therapy Treatment Note    Date: 11/25/2024  Name: Jayshree Bee  [facility] Number: [id number]    Therapy Diagnosis:   Encounter Diagnoses   Name Primary?    Weakness Yes    Decreased range of motion      Physician: Halie Mata MD    Physician Orders: OT Eval and Treat  Medical Diagnosis: G56.01 (ICD-10-CM) - Right carpal tunnel syndrome; M65.331 (ICD-10-CM) - Trigger middle finger of right hand; M67.441 (ICD-10-CM) - Ganglion cyst of flexor tendon sheath of finger of right hand   Surgical Procedure and Date:   1. Right carpal tunnel release, cpt 43868  2. Right long finger flexor tenosynovectomy, cpt 05629  3. Ganglion cyst of tendon sheath excision right long finger, cpt 91266  4. Right long finger MP mass excision, cpt 78396, 10-8-2024   Evaluation Date: 10/18/2024   Insurance Authorization Period Expiration: 12/31/2024  Plan of Care Expiration: 1/18/2024  Date of Return to MD: 12/11/2024   Visit # / Visits authorized: 10 / 16  FOTO: 0/2 (did not take)     Precautions:  Standard    Time In: 10:00 am  Time Out: 10:55 am  Total Billable Time: 42 minutes    SUBJECTIVE     Pt reports: No pain, just swelling.    She was compliant with home exercise program given last session.   Response to previous treatment: Good - improving motion   Functional change: able to wash dishes    Pain: 0/10  Location: right hand    OBJECTIVE   Objective Measures updated at progress report unless specified.    Observation/Appearance:  Joint stiffness     Sensation: Median Nerve Distribution    10/18/2024     Right   Monofilament Testing        Normal 1.65-2.83 Present   Diminished Light Touch 3.22-3.61 Present   Diminished Protective 3.84-4.31 Present   Loss of Protective 4.56-6.65 Present   Untestable >6.65 Present      Range of Motion:   Right Active    10/29/024   Long                           MP Ext/Flex 0/90 (+5)                         PIP Ext/Flex 0/100(+21)                          DIP Ext/Flex 0/60   THUMB                           MP Ext/Flex 0/full                         IP Ext/Flex 0/full                         Carrera ABD 50                         Radial ABD 50                         Opposition To SF P1      Comments:     Wrist ROM: Right  Active    10/29/2024   Extension 65(+10)   Flexion 80   Radial Deviation full   Ulnar Deviation full   Supination full   Pronation full       Strength (Dynamometer) and Pinch Strength (Pinch Gauge)  Measured in pounds to POP.   11/19/2024 11/19/2024    Left Right   Rung II 48 36   3pt Pinch 7 7     Treatment     Jayshree received the treatments listed below:     Supervised modalities after being cleared for contradictions:   - Patient received fluidotherapy to right hand(s) for 12 minutes to increase blood flow, circulation, desensitization, sensory re-education and for pain management. Pt instructed on performing active range of motion exercises while receiving heat to increase active motion.     Manual therapy techniques: Manual Lymphatic Drainage and Soft tissue Mobilization were applied to the: R hand for 10 minutes, including:   - Performed scar massage to decrease adhesions and improve tensile glide.   - myofascial static cupping X 2 minutes for loosening soft tissue, improve scar mobility on MP of LF and carpal tunnel scar.     Therapeutic exercises to develop endurance, ROM, and flexibility for 33 minutes, including:  - AROM in fluidotherapy x 12 min  - Wrist flex/ext x 10 reps each with PROM   - finger lifts x 15 reps   - Hook to full fist x 15 reps   - pad and dab x 15 reps    - Theraputty HEP, yellow: gripping x 2 min, tripod pinch x 2 logs @ home  - EDC ext with yellow band 2 x 10 reps   - Pt performed wrist flex, extension, supination and pronation, RD/UD with green flexbar x 15 reps each position to improve  strength and stabilization     Therapeutic activities to improve functional performance for 12 minutes, including:  - Octyi  x 2 min   - Golf ball rolling x 2 min   - Hook dowel roll with pencil x 2 min    - Power web, red: finger pulls 2 x 20 reps, pushes and pulls x 20 reps   - Issued digit edema sleeve size L    Patient Education and Home Exercises      Education provided:   - HEP in place, added: putty strengthening and resisted EDC  - Progress towards goals     Written Home Exercises Provided: yes.  Exercises were reviewed and Jayshree was able to demonstrate them prior to the end of the session.  Jayshree demonstrated good  understanding of the HEP provided. See EMR under Patient Instructions for exercises provided during therapy sessions.      ASSESSMENT     Pt tolerated session well including light strengthening. Full fisting, has great motion, increased in wrist flexion and extension, full finger extension. Min deficits in strength.    Jayshree is progressing well towards her goals and there are no updates to goals at this time. Pt prognosis is Good.     Pt will continue to benefit from skilled outpatient occupational therapy to address the deficits listed in the problem list on initial evaluation, provide pt/family education and to maximize pt's level of independence in the home and community environment.     Pt's spiritual, cultural and educational needs considered and pt agreeable to plan of care and goals.    Anticipated barriers to occupational therapy: healing    Goals:   ST-8  weeks :  1) Patient to be IND with HEP and modalities for pain managment. progressing not met 2024  2) Patient will  Increase Active Range of motion 8-10 degrees in hand/wrist to increase functional hand use for ADLs/work/leisure activities. progressing not met 2024  3) Pt will  increase FOTO  intake score by 15 % to increase their hand/wrist functional ability. progressing not met 2024  4)Pt will increase  strength by 10-20 lbs. to improve functional grasp  for ADLs/work/leisure activities.  progressing not met 2024  5) Pt  will increase pinch strength in key and lateral limited positions by 1-3 psi to assist with manipulation and fine motor task progressing not met 11/25/2024     LTG:   Goals to be met in 8-10  weeks:     1) Patient to be IND with HEP and modalities for pain management. progressing not met 11/25/2024  2) Patient will  Increase Active Range of motion 15-20 degrees in hand/wrist to increase functional hand use for ADLs/work/leisure activities. progressing not met 11/25/2024  3)Pt will increase  strength 25-30 lbs. to improve functional grasp for ADLs/work/leisure activities. progressing not met 11/25/2024  4) Pt will increase pinch strength in key and lateral pinch  limited positions by 3-5 psi psi to assist with manipulation and fine motor task. progressing not met 11/25/2024  5) Pt will increase  FOTO  intake score by 10 % to increase their hand/wrist functional ability. progressing not met 11/25/2024    PLAN     Continue skilled occupational therapy with individualized plan of care focusing on maximizing functional use of patient's right hand.    Updates/Grading for next session: progress as tolerated and per protocol.

## 2024-11-27 ENCOUNTER — CLINICAL SUPPORT (OUTPATIENT)
Dept: REHABILITATION | Facility: HOSPITAL | Age: 64
End: 2024-11-27
Payer: COMMERCIAL

## 2024-11-27 DIAGNOSIS — M25.60 DECREASED RANGE OF MOTION: ICD-10-CM

## 2024-11-27 DIAGNOSIS — R53.1 WEAKNESS: Primary | ICD-10-CM

## 2024-11-27 PROCEDURE — 97140 MANUAL THERAPY 1/> REGIONS: CPT

## 2024-11-27 PROCEDURE — 97022 WHIRLPOOL THERAPY: CPT

## 2024-11-27 PROCEDURE — 97110 THERAPEUTIC EXERCISES: CPT

## 2024-11-27 PROCEDURE — 97530 THERAPEUTIC ACTIVITIES: CPT

## 2024-11-27 NOTE — PROGRESS NOTES
[facility]  Occupational Therapy Treatment Note    Date: 11/27/2024  Name: Jayshree Bee  [facility] Number: [id number]    Therapy Diagnosis:   Encounter Diagnoses   Name Primary?    Weakness Yes    Decreased range of motion      Physician: Halie Mata MD    Physician Orders: OT Eval and Treat  Medical Diagnosis: G56.01 (ICD-10-CM) - Right carpal tunnel syndrome; M65.331 (ICD-10-CM) - Trigger middle finger of right hand; M67.441 (ICD-10-CM) - Ganglion cyst of flexor tendon sheath of finger of right hand   Surgical Procedure and Date:   1. Right carpal tunnel release, cpt 90142  2. Right long finger flexor tenosynovectomy, cpt 46668  3. Ganglion cyst of tendon sheath excision right long finger, cpt 75026  4. Right long finger MP mass excision, cpt 35119, 10-8-2024   Evaluation Date: 10/18/2024   Insurance Authorization Period Expiration: 12/31/2024  Plan of Care Expiration: 1/18/2024  Date of Return to MD: 12/11/2024   Visit # / Visits authorized: 11 / 16  FOTO: 0/2 (did not take)     Precautions:  Standard    Time In: 10:00 am  Time Out: 10:53 am  Total Billable Time: 53 minutes    SUBJECTIVE     Pt reports: No pain, just swelling.    She was compliant with home exercise program given last session.   Response to previous treatment: Good - improving motion   Functional change: able to wash dishes    Pain: 0/10  Location: right hand    OBJECTIVE   Objective Measures updated at progress report unless specified.    Observation/Appearance:  Joint stiffness     Sensation: Median Nerve Distribution    10/18/2024     Right   Monofilament Testing        Normal 1.65-2.83 Present   Diminished Light Touch 3.22-3.61 Present   Diminished Protective 3.84-4.31 Present   Loss of Protective 4.56-6.65 Present   Untestable >6.65 Present      Range of Motion:   Right Active    10/29/024   Long                           MP Ext/Flex 0/90 (+5)                         PIP Ext/Flex 0/100(+21)                          DIP Ext/Flex 0/60   THUMB                           MP Ext/Flex 0/full                         IP Ext/Flex 0/full                         Carrera ABD 50                         Radial ABD 50                         Opposition To SF P1      Comments:     Wrist ROM: Right  Active    10/29/2024   Extension 65(+10)   Flexion 80   Radial Deviation full   Ulnar Deviation full   Supination full   Pronation full       Strength (Dynamometer) and Pinch Strength (Pinch Gauge)  Measured in pounds to POP.   11/19/2024 11/19/2024    Left Right   Rung II 48 36   3pt Pinch 7 7     Treatment     Jayshree received the treatments listed below:     Supervised modalities after being cleared for contradictions:   - Patient received fluidotherapy to right hand(s) for 12 minutes to increase blood flow, circulation, desensitization, sensory re-education and for pain management. Pt instructed on performing active range of motion exercises while receiving heat to increase active motion.     Manual therapy techniques: Manual Lymphatic Drainage and Soft tissue Mobilization were applied to the: R hand for 10 minutes, including:   - Performed scar massage to decrease adhesions and improve tensile glide.   - myofascial static cupping X 2 minutes for loosening soft tissue, improve scar mobility on MP of LF and carpal tunnel scar.     Therapeutic exercises to develop endurance, ROM, and flexibility for 34 minutes, including:   - AROM in fluidotherapy x 12 min  - finger lifts x 15 reps   - Hook to full fist x 15 reps   - pad and dab x 15 reps    - Theraputty HEP, yellow: gripping x 2 min, tripod pinch x 2 logs @ home  - EDC ext with red band 2 x 10 reps   - Pt performed wrist flex, extension, supination and pronation, RD/UD with green flexbar x 15 reps each position to improve  strength and stabilization     Therapeutic activities to improve functional performance for 9 minutes, including:   - Octyi x 2 min   - Golf ball rolling x 2 min   -  Hook dowel roll with pencil x 2 min    - Power web, red: finger pulls 2 x 20 reps  - Issued digit edema sleeve size L     Patient Education and Home Exercises      Education provided:   - HEP in place  - Progress towards goals     Written Home Exercises Provided: Patient instructed to cont prior HEP.  Exercises were reviewed and Jayshree was able to demonstrate them prior to the end of the session.  Jayshree demonstrated good  understanding of the HEP provided. See EMR under Patient Instructions for exercises provided during therapy sessions.      ASSESSMENT     Pt tolerated session well including strengthening. Full fisting, has great motion, increased in wrist flexion and extension, full finger extension. Min deficits in strength.    Jayshree is progressing well towards her goals and there are no updates to goals at this time. Pt prognosis is Good.     Pt will continue to benefit from skilled outpatient occupational therapy to address the deficits listed in the problem list on initial evaluation, provide pt/family education and to maximize pt's level of independence in the home and community environment.     Pt's spiritual, cultural and educational needs considered and pt agreeable to plan of care and goals.    Anticipated barriers to occupational therapy: healing    Goals:   ST-8  weeks :  1) Patient to be IND with HEP and modalities for pain managment. progressing not met 2024  2) Patient will  Increase Active Range of motion 8-10 degrees in hand/wrist to increase functional hand use for ADLs/work/leisure activities. progressing not met 2024  3) Pt will  increase FOTO  intake score by 15 % to increase their hand/wrist functional ability. progressing not met 2024  4)Pt will increase  strength by 10-20 lbs. to improve functional grasp  for ADLs/work/leisure activities.  progressing not met 2024  5) Pt will increase pinch strength in key and lateral limited positions by 1-3 psi to assist with  manipulation and fine motor task progressing not met 11/27/2024     LTG:   Goals to be met in 8-10  weeks:     1) Patient to be IND with HEP and modalities for pain management. progressing not met 11/27/2024  2) Patient will  Increase Active Range of motion 15-20 degrees in hand/wrist to increase functional hand use for ADLs/work/leisure activities. progressing not met 11/27/2024  3)Pt will increase  strength 25-30 lbs. to improve functional grasp for ADLs/work/leisure activities. progressing not met 11/27/2024  4) Pt will increase pinch strength in key and lateral pinch  limited positions by 3-5 psi psi to assist with manipulation and fine motor task. progressing not met 11/27/2024  5) Pt will increase  FOTO  intake score by 10 % to increase their hand/wrist functional ability. progressing not met 11/27/2024    PLAN     Continue skilled occupational therapy with individualized plan of care focusing on maximizing functional use of patient's right hand.    Updates/Grading for next session: progress as tolerated and per protocol.

## 2024-12-03 ENCOUNTER — CLINICAL SUPPORT (OUTPATIENT)
Dept: REHABILITATION | Facility: HOSPITAL | Age: 64
End: 2024-12-03
Payer: COMMERCIAL

## 2024-12-03 DIAGNOSIS — R53.1 WEAKNESS: Primary | ICD-10-CM

## 2024-12-03 DIAGNOSIS — M25.60 DECREASED RANGE OF MOTION: ICD-10-CM

## 2024-12-03 PROCEDURE — 97022 WHIRLPOOL THERAPY: CPT

## 2024-12-03 PROCEDURE — 97530 THERAPEUTIC ACTIVITIES: CPT

## 2024-12-03 PROCEDURE — 97110 THERAPEUTIC EXERCISES: CPT

## 2024-12-03 PROCEDURE — 97140 MANUAL THERAPY 1/> REGIONS: CPT

## 2024-12-03 NOTE — PROGRESS NOTES
[facility] OUTPATIENT THERAPY AND WELLNESS  Occupational Therapy Treatment Note    Date: 12/3/2024  Name: Jayshree Bee  [facility] Number: [id number]    Therapy Diagnosis:   Encounter Diagnoses   Name Primary?    Weakness Yes    Decreased range of motion      Physician: Halie Mata MD    Physician Orders: OT Eval and Treat  Medical Diagnosis: G56.01 (ICD-10-CM) - Right carpal tunnel syndrome; M65.331 (ICD-10-CM) - Trigger middle finger of right hand; M67.441 (ICD-10-CM) - Ganglion cyst of flexor tendon sheath of finger of right hand   Surgical Procedure and Date:   1. Right carpal tunnel release, cpt 65544  2. Right long finger flexor tenosynovectomy, cpt 35910  3. Ganglion cyst of tendon sheath excision right long finger, cpt 36447  4. Right long finger MP mass excision, cpt 15241, 10-8-2024   Evaluation Date: 10/18/2024   Insurance Authorization Period Expiration: 12/31/2024  Plan of Care Expiration: 1/18/2024  Date of Return to MD: 12/11/2024   Visit # / Visits authorized: 12 / 16  FOTO: 0/2 (did not take)     Precautions:  Standard    Time In: 10:00 am  Time Out: 10:40 am  Total Billable Time: 40 minutes    SUBJECTIVE     Pt reports: No pain, just swelling. Stiff from the cold.   She was compliant with home exercise program given last session.   Response to previous treatment: Good - improving motion   Functional change: able to wash dishes    Pain: 0/10  Location: right hand    OBJECTIVE   Objective Measures updated at progress report unless specified.    Observation/Appearance:  Joint stiffness     Sensation: Median Nerve Distribution    10/18/2024     Right   Monofilament Testing        Normal 1.65-2.83 Present   Diminished Light Touch 3.22-3.61 Present   Diminished Protective 3.84-4.31 Present   Loss of Protective 4.56-6.65 Present   Untestable >6.65 Present      Range of Motion:   Right Active    10/29/024   Long                           MP Ext/Flex 0/90 (+5)                         PIP Ext/Flex 0/100(+21)                          DIP Ext/Flex 0/60   THUMB                           MP Ext/Flex 0/full                         IP Ext/Flex 0/full                         Carrera ABD 50                         Radial ABD 50                         Opposition To SF P1      Comments:     Wrist ROM: Right  Active    10/29/2024   Extension 65(+10)   Flexion 80   Radial Deviation full   Ulnar Deviation full   Supination full   Pronation full       Strength (Dynamometer) and Pinch Strength (Pinch Gauge)  Measured in pounds to POP.   11/19/2024 11/19/2024    Left Right   Rung II 48 36   3pt Pinch 7 7     Treatment     Jayshree received the treatments listed below:     Supervised modalities after being cleared for contradictions:   - Patient received fluidotherapy to right hand(s) for 12 minutes to increase blood flow, circulation, desensitization, sensory re-education and for pain management. Pt instructed on performing active range of motion exercises while receiving heat to increase active motion.     Manual therapy techniques: Manual Lymphatic Drainage and Soft tissue Mobilization were applied to the: R hand for 10 minutes, including:   - Performed scar massage to decrease adhesions and improve tensile glide.   - myofascial static cupping X 2 minutes for loosening soft tissue, improve scar mobility on MP of LF and carpal tunnel scar.     Therapeutic exercises to develop endurance, ROM, and flexibility for 21 minutes, including:   - AROM in fluidotherapy x 12 min  - finger lifts x 15 reps   - pad and dab x 15 reps    - Resisted digit abd x 15 reps with red band  - Theraputty HEP, yellow: gripping x 2 min, tripod pinch x 2 logs @ home  - EDC ext with red band 2 x 10 reps   - Pt performed wrist flex, extension, supination and pronation, RD/UD with green flexbar x 15 reps each position to improve  strength and stabilization     Therapeutic activities to improve functional performance for 9 minutes, including:   - Octyi x 2 min    - Golf ball rolling x 2 min   - Hook dowel roll with pencil x 2 min    - Power web, red: finger pulls x 30 reps  - Issued Mepitac for scar management     Patient Education and Home Exercises      Education provided:   - HEP in place  - Progress towards goals     Written Home Exercises Provided: Patient instructed to cont prior HEP.  Exercises were reviewed and Jayshree was able to demonstrate them prior to the end of the session.  Jayshree demonstrated good  understanding of the HEP provided. See EMR under Patient Instructions for exercises provided during therapy sessions.      ASSESSMENT     Pt tolerated session well including strengthening. Full fisting, has great motion, increased in wrist flexion and extension, full finger extension. Min deficits in strength.    Jayshree is progressing well towards her goals and there are no updates to goals at this time. Pt prognosis is Good.     Pt will continue to benefit from skilled outpatient occupational therapy to address the deficits listed in the problem list on initial evaluation, provide pt/family education and to maximize pt's level of independence in the home and community environment.     Pt's spiritual, cultural and educational needs considered and pt agreeable to plan of care and goals.    Anticipated barriers to occupational therapy: healing    Goals:   ST-8  weeks :  1) Patient to be IND with HEP and modalities for pain managment. progressing not met 12/3/2024  2) Patient will  Increase Active Range of motion 8-10 degrees in hand/wrist to increase functional hand use for ADLs/work/leisure activities. progressing not met 12/3/2024  3) Pt will  increase FOTO  intake score by 15 % to increase their hand/wrist functional ability. progressing not met 12/3/2024  4)Pt will increase  strength by 10-20 lbs. to improve functional grasp  for ADLs/work/leisure activities.  progressing not met 12/3/2024  5) Pt will increase pinch strength in key and lateral limited  positions by 1-3 psi to assist with manipulation and fine motor task progressing not met 12/3/2024     LTG:   Goals to be met in 8-10  weeks:     1) Patient to be IND with HEP and modalities for pain management. progressing not met 12/3/2024  2) Patient will  Increase Active Range of motion 15-20 degrees in hand/wrist to increase functional hand use for ADLs/work/leisure activities. progressing not met 12/3/2024  3)Pt will increase  strength 25-30 lbs. to improve functional grasp for ADLs/work/leisure activities. progressing not met 12/3/2024  4) Pt will increase pinch strength in key and lateral pinch  limited positions by 3-5 psi psi to assist with manipulation and fine motor task. progressing not met 12/3/2024  5) Pt will increase  FOTO  intake score by 10 % to increase their hand/wrist functional ability. progressing not met 12/3/2024    PLAN     Continue skilled occupational therapy with individualized plan of care focusing on maximizing functional use of patient's right hand.    Updates/Grading for next session: progress as tolerated and per protocol.

## 2024-12-04 ENCOUNTER — PATIENT MESSAGE (OUTPATIENT)
Dept: PRIMARY CARE CLINIC | Facility: CLINIC | Age: 64
End: 2024-12-04
Payer: COMMERCIAL

## 2024-12-04 DIAGNOSIS — F51.01 PRIMARY INSOMNIA: ICD-10-CM

## 2024-12-04 DIAGNOSIS — Z85.3 HISTORY OF BREAST CANCER: ICD-10-CM

## 2024-12-04 DIAGNOSIS — N94.12 DEEP DYSPAREUNIA: ICD-10-CM

## 2024-12-04 NOTE — TELEPHONE ENCOUNTER
Refill Routing Note   Medication(s) are not appropriate for processing by [facility] for the following reason(s):        Outside of protocol    [facility] action(s):  Route             Appointments  past 12m or future 3m with PCP    Date Provider   Last Visit   3/13/2024 Cortez Miranda MD   Next Visit   Visit date not found Cortez Miranda MD   ED visits in past 90 days: 0        Note composed:5:24 PM 12/04/2024

## 2024-12-04 NOTE — TELEPHONE ENCOUNTER
No care due was identified.  [facility] Embedded Care Due Messages. Reference number: [id number].   12/04/2024 11:37:30 AM CST

## 2024-12-05 ENCOUNTER — CLINICAL SUPPORT (OUTPATIENT)
Dept: REHABILITATION | Facility: HOSPITAL | Age: 64
End: 2024-12-05
Attending: ORTHOPAEDIC SURGERY
Payer: COMMERCIAL

## 2024-12-05 DIAGNOSIS — R53.1 WEAKNESS: Primary | ICD-10-CM

## 2024-12-05 DIAGNOSIS — M25.60 DECREASED RANGE OF MOTION: ICD-10-CM

## 2024-12-05 PROCEDURE — 97140 MANUAL THERAPY 1/> REGIONS: CPT

## 2024-12-05 PROCEDURE — 97530 THERAPEUTIC ACTIVITIES: CPT

## 2024-12-05 PROCEDURE — 97110 THERAPEUTIC EXERCISES: CPT

## 2024-12-05 NOTE — PROGRESS NOTES
"  [facility] OUTPATIENT THERAPY AND WELLNESS  Occupational Therapy Treatment Note    Date: 12/5/2024  Name: Jayshree Bee  [facility] Number: [id number]    Therapy Diagnosis:   Encounter Diagnoses   Name Primary?    Weakness Yes    Decreased range of motion        Physician: Halie Mata MD    Physician Orders: OT Eval and Treat  Medical Diagnosis: G56.01 (ICD-10-CM) - Right carpal tunnel syndrome; M65.331 (ICD-10-CM) - Trigger middle finger of right hand; M67.441 (ICD-10-CM) - Ganglion cyst of flexor tendon sheath of finger of right hand   Surgical Procedure and Date:   1. Right carpal tunnel release, cpt 65142  2. Right long finger flexor tenosynovectomy, cpt 59479  3. Ganglion cyst of tendon sheath excision right long finger, cpt 75166  4. Right long finger MP mass excision, cpt 45179, 10-8-2024   Evaluation Date: 10/18/2024   Insurance Authorization Period Expiration: 12/31/2024  Plan of Care Expiration: 1/18/2024  Date of Return to MD: 12/11/2024   Visit # / Visits authorized: 14 / 16  FOTO: 0/2 (did not take)     Precautions:  Standard    Time In: 10:00 am    Time Out: 10:54  Total Billable Time: 54 minutes     SUBJECTIVE     Pt reports: " My MF is a little swollen since I didn't get to massage it this morning. My joint feels a little stiff"  She was compliant with home exercise program given last session.   Response to previous treatment: "It was good"   Functional change: able to wash dishes    Pain: 0/10 resting and doing daily activities; " Only if I mash on it it can be uncomfortable and around 3-4/10"   Location: right hand    OBJECTIVE   Objective Measures updated at progress report unless specified.    Observation/Appearance:  Joint stiffness     Sensation: Median Nerve Distribution    10/18/2024     Right   Monofilament Testing        Normal 1.65-2.83 Present   Diminished Light Touch 3.22-3.61 Present   Diminished Protective 3.84-4.31 Present   Loss of Protective 4.56-6.65 Present   Untestable >6.65 " Present      Range of Motion:   Right Active    10/29/024   Long                           MP Ext/Flex 0/90 (+5)                         PIP Ext/Flex 0/100(+21)                         DIP Ext/Flex 0/60   THUMB                           MP Ext/Flex 0/full                         IP Ext/Flex 0/full                         Carrera ABD 50                         Radial ABD 50                         Opposition To SF P1      Comments:     Wrist ROM: Right  Active    10/29/2024   Extension 65(+10)   Flexion 80   Radial Deviation full   Ulnar Deviation full   Supination full   Pronation full       Strength (Dynamometer) and Pinch Strength (Pinch Gauge)  Measured in pounds to POP.   11/19/2024 11/19/2024    Left Right   Rung II 48 36   3pt Pinch 7 7     Treatment     Jayshree received the treatments listed below:     Supervised modalities after being cleared for contradictions:   - Patient received fluidotherapy to right hand(s) for 12 minutes to increase blood flow, circulation, desensitization, sensory re-education and for pain management. Pt instructed on performing active range of motion exercises while receiving heat to increase active motion.     Manual therapy techniques: Manual Lymphatic Drainage and Soft tissue Mobilization were applied to the: R hand for 10 minutes, including:   - Performed scar massage to decrease adhesions and improve tensile glide.   - myofascial static cupping X 2 minutes for loosening soft tissue, improve scar mobility on MP of LF and carpal tunnel scar.     Therapeutic exercises to develop endurance, ROM, and flexibility for 34 minutes, including:   - AROM in fluidotherapy x 12 min  - finger lifts x 15 reps   - pad and dab x 15 reps    - Resisted digit abd x 15 reps with red band  - Theraputty Yellow: gripping in supination, neutral, pronation x 10 each, tripod pinch x 2 minutes   - EDC ext with red band 2 x 10 reps   - Pt performed wrist flexion and extension ( yellow flexbar), supination  and pronation ( green flexbar ), RD/UD with ( yellow flexbar) x 15 reps each position to improve  strength and stabilization     Therapeutic activities to improve functional performance for 10 minutes, including:   - Octyi x 2 min   - Golf ball rolling x 2 min   - Hook dowel roll with pencil x 2 min    - Power web, red: finger pulls x 30 reps    Patient Education and Home Exercises      Education provided:   - HEP in place  - Progress towards goals     Written Home Exercises Provided: Patient instructed to cont prior HEP.  Exercises were reviewed and Jayshree was able to demonstrate them prior to the end of the session.  Jayshree demonstrated good  understanding of the HEP provided. See EMR under Patient Instructions for exercises provided during therapy sessions.      ASSESSMENT     Pt. tolerated treatment session with min. verbal cueing after demonstration provided to adjust body mechanics when performing therapeutic exercises. Minimal sensitivity noted when pressure applied to scars.     Jayshree is progressing well towards her goals and there are no updates to goals at this time. Pt prognosis is Good.     Pt will continue to benefit from skilled outpatient occupational therapy to address the deficits listed in the problem list on initial evaluation, provide pt/family education and to maximize pt's level of independence in the home and community environment.     Pt's spiritual, cultural and educational needs considered and pt agreeable to plan of care and goals.    Anticipated barriers to occupational therapy: healing    Goals:   ST-8  weeks :  1) Patient to be IND with HEP and modalities for pain managment. progressing not met 2024  2) Patient will  Increase Active Range of motion 8-10 degrees in hand/wrist to increase functional hand use for ADLs/work/leisure activities. progressing not met 2024  3) Pt will  increase FOTO  intake score by 15 % to increase their hand/wrist functional ability.  progressing not met 12/5/2024  4)Pt will increase  strength by 10-20 lbs. to improve functional grasp  for ADLs/work/leisure activities.  progressing not met 12/5/2024  5) Pt will increase pinch strength in key and lateral limited positions by 1-3 psi to assist with manipulation and fine motor task progressing not met 12/5/2024     LTG:   Goals to be met in 8-10  weeks:     1) Patient to be IND with HEP and modalities for pain management. progressing not met 12/5/2024  2) Patient will  Increase Active Range of motion 15-20 degrees in hand/wrist to increase functional hand use for ADLs/work/leisure activities. progressing not met 12/5/2024  3)Pt will increase  strength 25-30 lbs. to improve functional grasp for ADLs/work/leisure activities. progressing not met 12/5/2024  4) Pt will increase pinch strength in key and lateral pinch  limited positions by 3-5 psi psi to assist with manipulation and fine motor task. progressing not met 12/5/2024  5) Pt will increase  FOTO  intake score by 10 % to increase their hand/wrist functional ability. progressing not met 12/5/2024    PLAN     Continue skilled occupational therapy with individualized plan of care focusing on maximizing functional use of patient's right hand.    Updates/Grading for next session: progress as tolerated and per protocol.       Ilya Gray OTR/L

## 2024-12-06 RX ORDER — TIZANIDINE 2 MG/1
TABLET ORAL
Qty: 30 TABLET | Refills: 0 | Status: SHIPPED | OUTPATIENT
Start: 2024-12-06

## 2024-12-06 RX ORDER — PRASTERONE 6.5 MG/1
1 INSERT VAGINAL NIGHTLY
Qty: 28 EACH | Refills: 11 | Status: SHIPPED | OUTPATIENT
Start: 2024-12-06

## 2024-12-10 ENCOUNTER — CLINICAL SUPPORT (OUTPATIENT)
Dept: REHABILITATION | Facility: HOSPITAL | Age: 64
End: 2024-12-10
Attending: ORTHOPAEDIC SURGERY
Payer: COMMERCIAL

## 2024-12-10 DIAGNOSIS — M25.60 DECREASED RANGE OF MOTION: ICD-10-CM

## 2024-12-10 DIAGNOSIS — R53.1 WEAKNESS: Primary | ICD-10-CM

## 2024-12-10 PROCEDURE — 97530 THERAPEUTIC ACTIVITIES: CPT

## 2024-12-10 PROCEDURE — 97140 MANUAL THERAPY 1/> REGIONS: CPT

## 2024-12-10 PROCEDURE — 97110 THERAPEUTIC EXERCISES: CPT

## 2024-12-10 NOTE — PROGRESS NOTES
"  [facility]  Occupational Therapy Treatment Note    Date: 12/10/2024  Name: Jayshree Bee  [facility] Number: [id number]    Therapy Diagnosis:   Encounter Diagnoses   Name Primary?    Weakness Yes    Decreased range of motion          Physician: Halie Mata MD    Physician Orders: OT Eval and Treat  Medical Diagnosis: G56.01 (ICD-10-CM) - Right carpal tunnel syndrome; M65.331 (ICD-10-CM) - Trigger middle finger of right hand; M67.441 (ICD-10-CM) - Ganglion cyst of flexor tendon sheath of finger of right hand   Surgical Procedure and Date:   1. Right carpal tunnel release, cpt 73427  2. Right long finger flexor tenosynovectomy, cpt 44477  3. Ganglion cyst of tendon sheath excision right long finger, cpt 40162  4. Right long finger MP mass excision, cpt 40681, 10-8-2024   Evaluation Date: 10/18/2024   Insurance Authorization Period Expiration: 12/31/2024  Plan of Care Expiration: 1/18/2024  Date of Return to MD: 12/11/2024   Visit # / Visits authorized: 14 / 16  FOTO: 0/2 (did not take)     Precautions:  Standard    Time In: 10:02  Time Out: 11:02  Total Billable Time: 60 minutes     SUBJECTIVE     Pt reports: " Feel like I have turned a corner and it is getting better"   She was compliant with home exercise program given last session.   Response to previous treatment: It went well   Functional change: able to wash dishes    Pain: 0 0/10 resting and 2/4 doing daily activities when gripping a lot   Location: right hand    OBJECTIVE   Objective Measures updated at progress report unless specified.    Observation/Appearance:  Joint stiffness     Sensation: Median Nerve Distribution    10/18/2024     Right   Monofilament Testing        Normal 1.65-2.83 Present   Diminished Light Touch 3.22-3.61 Present   Diminished Protective 3.84-4.31 Present   Loss of Protective 4.56-6.65 Present   Untestable >6.65 Present      Range of Motion:   Right Active    10/29/024 12/10/24   Long                      "       MP Ext/Flex 0/90 (+5) (90) 0/90                         PIP Ext/Flex 0/100(+21) (90) 0/90                         DIP Ext/Flex 0/60 (+10) pain during passive stretching DIP  0/70(+10)   THUMB                            MP Ext/Flex 0/full 0/full                         IP Ext/Flex 0/full 0/full                         Carrera ABD 50 50                         Radial ABD 50 50                         Opposition To SF P1  To SF between PF and DPC       Comments:     Wrist ROM: Right  Active    10/29/2024 12/10/24   Extension 65(+10)  68 (+3)   Flexion 80  78 (-2)   Radial Deviation full full   Ulnar Deviation full full   Supination full full   Pronation full full       Strength (Dynamometer) and Pinch Strength (Pinch Gauge)  Measured in pounds to POP.   11/19/2024 11/19/2024 12/10/2024    Left Right Right   Rung II 48 36 (41) 41(+5)   3pt Pinch 7 7 (9) 9(+2)     Treatment     Jayshree received the treatments listed below:     Supervised modalities after being cleared for contradictions:   - Patient received fluidotherapy to right hand(s) for 12 minutes to increase blood flow, circulation, desensitization, sensory re-education and for pain management. Pt instructed on performing active range of motion exercises while receiving heat to increase active motion.     Manual therapy techniques: Manual Lymphatic Drainage and Soft tissue Mobilization were applied to the: R hand for 10 minutes, including:   - Performed scar massage to decrease adhesions and improve tensile glide.   - myofascial static cupping X 2 minutes for loosening soft tissue and IASTM x 2 minutes to improve scar mobility on MP of LF and carpal tunnel scar.     Therapeutic exercises to develop endurance, ROM, and flexibility for 34 minutes, including:   - AROM in fluidotherapy x 12 min  - finger lifts x 15 reps   - pad and dab x 15 reps    - Resisted digit abd x 15 reps with green band  - Theraputty Yellow: gripping in supination, neutral, pronation x  10 each, tripod pinch x 2 minutes   - EDC ext with red band 2 x 10 reps   - Pt performed wrist flexion and extension ( yellow flexbar), supination and pronation ( green flexbar ), RD/UD with ( yellow flexbar) x 15 reps each position to improve  strength and stabilization     Therapeutic activities to improve functional performance for 10 minutes, including:   - Octyi x 2 min   - Golf ball rolling x 2 min   - Hook dowel roll with pencil x 2 min    - Power web, red: finger pulls x 30 reps    Patient Education and Home Exercises      Education provided:   - HEP in place  - Progress towards goals     Written Home Exercises Provided: Patient instructed to cont prior HEP.  Exercises were reviewed and Jayshree was able to demonstrate them prior to the end of the session.  Jayshree demonstrated good  understanding of the HEP provided. See EMR under Patient Instructions for exercises provided during therapy sessions.      ASSESSMENT       Pt. tolerated treatment session well. Pt. was not as sensitive at scar site during scar massage compared to previous session. Measurements indicate improved DIP flexion by 10 degree, thumb opposition as pt. was able to reach between PF and DPC, and improved /pinch strength noted.     Jayshree is progressing well towards her goals and there are no updates to goals at this time. Pt prognosis is Good.     Pt will continue to benefit from skilled outpatient occupational therapy to address the deficits listed in the problem list on initial evaluation, provide pt/family education and to maximize pt's level of independence in the home and community environment.     Pt's spiritual, cultural and educational needs considered and pt agreeable to plan of care and goals.    Anticipated barriers to occupational therapy: healing    Goals:   ST-8  weeks :  1) Patient to be IND with HEP and modalities for pain managment. progressing not met 12/10/2024  2) Patient will  Increase Active Range of motion  8-10 degrees in hand/wrist to increase functional hand use for ADLs/work/leisure activities. progressing not met 12/10/2024  3) Pt will  increase FOTO  intake score by 15 % to increase their hand/wrist functional ability. progressing not met 12/10/2024  4)Pt will increase  strength by 10-20 lbs. to improve functional grasp  for ADLs/work/leisure activities.  progressing not met 12/10/2024  5) Pt will increase pinch strength in key and lateral limited positions by 1-3 psi to assist with manipulation and fine motor task progressing not met 12/10/2024     LTG:   Goals to be met in 8-10  weeks:     1) Patient to be IND with HEP and modalities for pain management. progressing not met 12/10/2024  2) Patient will  Increase Active Range of motion 15-20 degrees in hand/wrist to increase functional hand use for ADLs/work/leisure activities. progressing not met 12/10/2024  3)Pt will increase  strength 25-30 lbs. to improve functional grasp for ADLs/work/leisure activities. progressing not met 12/10/2024  4) Pt will increase pinch strength in key and lateral pinch  limited positions by 3-5 psi psi to assist with manipulation and fine motor task. progressing not met 12/10/2024  5) Pt will increase  FOTO  intake score by 10 % to increase their hand/wrist functional ability. progressing not met 12/10/2024    PLAN     Continue skilled occupational therapy with individualized plan of care focusing on maximizing functional use of patient's right hand.    Updates/Grading for next session: progress as tolerated and per protocol.       Ilya Gray OTR/L

## 2024-12-11 ENCOUNTER — OFFICE VISIT (OUTPATIENT)
Dept: ORTHOPEDICS | Facility: CLINIC | Age: 64
End: 2024-12-11
Payer: COMMERCIAL

## 2024-12-11 DIAGNOSIS — M65.331 TRIGGER MIDDLE FINGER OF RIGHT HAND: ICD-10-CM

## 2024-12-11 DIAGNOSIS — M18.12 PRIMARY OSTEOARTHRITIS OF FIRST CARPOMETACARPAL JOINT OF LEFT HAND: ICD-10-CM

## 2024-12-11 DIAGNOSIS — G89.29 CHRONIC PAIN OF LEFT HAND: ICD-10-CM

## 2024-12-11 DIAGNOSIS — M79.642 CHRONIC PAIN OF LEFT HAND: ICD-10-CM

## 2024-12-11 DIAGNOSIS — M67.441 GANGLION CYST OF FLEXOR TENDON SHEATH OF FINGER OF RIGHT HAND: ICD-10-CM

## 2024-12-11 DIAGNOSIS — G56.01 RIGHT CARPAL TUNNEL SYNDROME: Primary | ICD-10-CM

## 2024-12-11 DIAGNOSIS — R22.31 FINGER MASS, RIGHT: ICD-10-CM

## 2024-12-11 PROCEDURE — 1125F AMNT PAIN NOTED PAIN PRSNT: CPT | Mod: CPTII,S$GLB,, | Performed by: ORTHOPAEDIC SURGERY

## 2024-12-11 PROCEDURE — 99024 POSTOP FOLLOW-UP VISIT: CPT | Mod: S$GLB,,, | Performed by: ORTHOPAEDIC SURGERY

## 2024-12-11 PROCEDURE — 1159F MED LIST DOCD IN RCRD: CPT | Mod: CPTII,S$GLB,, | Performed by: ORTHOPAEDIC SURGERY

## 2024-12-11 PROCEDURE — 99999 PR PBB SHADOW E&M-EST. PATIENT-LVL III: CPT | Mod: PBBFAC,,, | Performed by: ORTHOPAEDIC SURGERY

## 2024-12-11 PROCEDURE — 1160F RVW MEDS BY RX/DR IN RCRD: CPT | Mod: CPTII,S$GLB,, | Performed by: ORTHOPAEDIC SURGERY

## 2024-12-11 PROCEDURE — 20600 DRAIN/INJ JOINT/BURSA W/O US: CPT | Mod: 79,LT,S$GLB, | Performed by: ORTHOPAEDIC SURGERY

## 2024-12-11 PROCEDURE — 1101F PT FALLS ASSESS-DOCD LE1/YR: CPT | Mod: CPTII,S$GLB,, | Performed by: ORTHOPAEDIC SURGERY

## 2024-12-11 PROCEDURE — 3288F FALL RISK ASSESSMENT DOCD: CPT | Mod: CPTII,S$GLB,, | Performed by: ORTHOPAEDIC SURGERY

## 2024-12-11 PROCEDURE — 99214 OFFICE O/P EST MOD 30 MIN: CPT | Mod: 24,25,S$GLB, | Performed by: ORTHOPAEDIC SURGERY

## 2024-12-11 RX ADMIN — METHYLPREDNISOLONE ACETATE 40 MG: 40 INJECTION, SUSPENSION INTRA-ARTICULAR; INTRALESIONAL; INTRAMUSCULAR; SOFT TISSUE at 11:12

## 2024-12-11 NOTE — Clinical Note
[facility]  [address], [address]  [facility] [address]  Phone: [phone]

## 2024-12-11 NOTE — PROCEDURES
Small Joint Aspiration/Injection: L thumb CMC    Date/Time: 12/11/2024 11:15 AM    Performed by: Halie Mata MD  Authorized by: Halie Mata MD    Consent Done?:  Yes (Verbal)  Indications:  Pain  Timeout: prior to procedure the correct patient, procedure, and site was verified    Prep: patient was prepped and draped in usual sterile fashion      Local anesthesia used?: Yes    Anesthesia:  Local infiltration  Local anesthetic:  Lidocaine 1% without epinephrine  Location:  Thumb  Site:  L thumb CMC  Needle size:  25 G  Medications:  40 mg methylPREDNISolone acetate 40 mg/mL  Patient tolerance:  Patient tolerated the procedure well with no immediate complications

## 2024-12-11 NOTE — PATIENT INSTRUCTIONS
Today, you saw Dr. Mata and were aftercare following surgery    We decided the next step(s) in in your post-surgical care is/are  Work status: no light duty at work; continue no work status until next visit  Steroid injection left thumb cmc joint given today  Continue OT: strengthening    Thank you for allowing me to participate in your care.  We will see you back in 6-8 weeks to discuss full return to work and possible right long finger DIP Joint steroid injection or sooner if needed.       [facility]  Halie Mata MD        Your hand  was injected with two medications today:   Numbing medicine (lidocaine)   Corticosteroid (Depo-Medrol).    *The numbing medicine works immediately and works for a few hours. The steroid medication could take up to a week to work.     FAQ:  You may notice that your pain returns or even worsens after the numbing medicine wears off.    If pain worsens, treat with ice 30 minutes on and 10 minutes off the area, over the counter or prescription anti-inflammatories or tylenol and rest.    Call for any redness, fevers, chills or inability to move the joint. Call the office if pain does not improve in 2-3 days.   If you have diabetes, the steroid can temporarily increase your blood sugar. This can last up to 1-2 weeks.   Be mindful of your diet and monitor your blood sugar at home  If you have any concerns about your blood sugar levels please contact your primary care physician     Thank you for your attention to these instructions. If you have questions, do not hesitate to reach out through the portal or call [phone]

## 2024-12-11 NOTE — PROGRESS NOTES
[facility]  Hand and Upper Extremity  Post Op Visit  SUBJECTIVE:  Jayshree Bee presents for post-operative evaluation of   No diagnosis found.      History of Present Illness:  The patient is now 3 weeks 1 day status post right carpal tunnel release, right long finger flexor tenosynovectomy, right long finger ganglion cyst of tendon sheath excision, and right long finger dorsal MP joint mass excision by Dr. Mata on 10/8/2024. Overall the patient reports doing well post-operatively. The patient reports appropriate post-operative soreness with well controlled overall pain. She notes mild pillar pain after doing range of motion exercises. She is working on range of motion at formal OT. She has been wearing her carpal tunnel brace. She states a small dark spot appeared on her wound yesterday and she is wondering if it is a piece of suture. Patient denies redness, warmth, or drainage from surgical site. She denies fever, chills, or sweats since the surgery.     HPI Interval 12/11/24  Pt states her carpal tunnel has improved except for some pillar pain. Her middle finger is stiff but once she moves it it becomes better. Denies burning sensation she previously experience. She is frustrated with her PIP joint and also having left thumb CMC pain.     Final Pathologic Diagnosis 1. Soft tissue, right long finger, excision:  - Ganglion cyst  - Negative for malignancy    2. Soft tissue, dorsal joint right hand, excision:  - Sclerotic fibroadipose tissue  - Negative for malignancy     Vitals:    12/11/24 1102   PainSc:   1   PainLoc: Hand        OBJECTIVE:  Physical Exam:    Vital signs are stable, patient is afebrile.  AA&O x 4.  NAD.  HEENT: NCAT, sclera nonicteric  Lungs: Respirations are equal and unlabored.  CV: 2+ bilateral upper and lower extremity pulses.  MSK: The wounds are well healed. Mild pillar pain. PIPj ttp.   Left CMC ttp with +AAG    Diagnostics Review:  Last x-rays bilateral hand 5/7/24      Assessment & Plan: Status post above, doing well.   Continue hand therapy, PIPj stiffness expected with OA and trigger finger resolution  Will give CSI left CMC   Will follow up in 6 weeks and if no major improvement, can consider CSI to PIPj  Will need xrays of bilateral hands in 6 week followup    The patient was seen and discussed with attending surgeon, MD Anjel Hart/[facility]

## 2024-12-12 ENCOUNTER — CLINICAL SUPPORT (OUTPATIENT)
Dept: REHABILITATION | Facility: HOSPITAL | Age: 64
End: 2024-12-12
Payer: COMMERCIAL

## 2024-12-12 DIAGNOSIS — M25.60 DECREASED RANGE OF MOTION: ICD-10-CM

## 2024-12-12 DIAGNOSIS — R53.1 WEAKNESS: Primary | ICD-10-CM

## 2024-12-12 PROCEDURE — 97110 THERAPEUTIC EXERCISES: CPT

## 2024-12-12 PROCEDURE — 97530 THERAPEUTIC ACTIVITIES: CPT

## 2024-12-12 PROCEDURE — 97140 MANUAL THERAPY 1/> REGIONS: CPT

## 2024-12-12 PROCEDURE — 97022 WHIRLPOOL THERAPY: CPT

## 2024-12-12 NOTE — PATIENT INSTRUCTIONS
[facility], OCCUP[facility]ERAPY  HOME EXERCISE PROGRAM   2 x a day    Pt performed wrist flexion and extension (red flexbar), supination and pronation (green flexbar), RD/UD opening jar/bottle end of bar with (red flexbar) x 15 reps each position to improve  strength and stabilization

## 2024-12-12 NOTE — PROGRESS NOTES
"  [facility]  Occupational Therapy Treatment Note    Date: 12/12/2024  Name: Jayshree Bee  [facility] Number: [id number]    Therapy Diagnosis:   Encounter Diagnoses   Name Primary?    Weakness Yes    Decreased range of motion      Physician: Halie Mata MD    Physician Orders: OT Eval and Treat  Medical Diagnosis: G56.01 (ICD-10-CM) - Right carpal tunnel syndrome; M65.331 (ICD-10-CM) - Trigger middle finger of right hand; M67.441 (ICD-10-CM) - Ganglion cyst of flexor tendon sheath of finger of right hand   Surgical Procedure and Date:   1. Right carpal tunnel release, cpt 96900  2. Right long finger flexor tenosynovectomy, cpt 00416  3. Ganglion cyst of tendon sheath excision right long finger, cpt 92170  4. Right long finger MP mass excision, cpt 55758, 10-8-2024   Evaluation Date: 10/18/2024   Insurance Authorization Period Expiration: 12/31/2024  Plan of Care Expiration: 1/18/2024  Date of Return to MD: 1/22/2025   Visit # / Visits authorized: 15 / 15  FOTO: 0/2 (did not take)     Precautions:  Standard    Time In: 9:59 am  Time Out: 10:55 am  Total Billable Time: 56 minutes     SUBJECTIVE     Pt reports: "I don't need the heat as much as before for the flexibility."   She was compliant with home exercise program given last session.   Response to previous treatment: It went well   Functional change: able to wash dishes    Pain: 0/10 resting and 2-4/10 doing daily activities when gripping a lot   Location: right hand    OBJECTIVE   Objective Measures updated at progress report unless specified.    Observation/Appearance: Incisions well healed.     Sensation: Median Nerve Distribution    10/18/2024     Right   Monofilament Testing        Normal 1.65-2.83 Present   Diminished Light Touch 3.22-3.61 Present   Diminished Protective 3.84-4.31 Present   Loss of Protective 4.56-6.65 Present   Untestable >6.65 Present      Range of Motion:   Right Active    10/29/024 12/10/24   Long             "                MP Ext/Flex 0/90 (+5) (90) 0/90                         PIP Ext/Flex 0/100(+21) (90) 0/90                         DIP Ext/Flex 0/60 (+10) pain during passive stretching DIP  0/70(+10)   THUMB                            MP Ext/Flex 0/full 0/full                         IP Ext/Flex 0/full 0/full                         Carrera ABD 50 50                         Radial ABD 50 50                         Opposition To SF P1  To SF between PF and DPC       Comments:     Wrist ROM: Right  Active    10/29/2024 12/10/24   Extension 65(+10)  68 (+3)   Flexion 80  78 (-2)   Radial Deviation full full   Ulnar Deviation full full   Supination full full   Pronation full full       Strength (Dynamometer) and Pinch Strength (Pinch Gauge)  Measured in pounds to POP.   11/19/2024 11/19/2024 12/10/2024    Left Right Right   Rung II 48 36 (41) 41(+5)   3pt Pinch 7 7 (9) 9(+2)     Treatment     Jayshree received the treatments listed below:     Supervised modalities after being cleared for contradictions:   - Patient received fluidotherapy to right hand(s) for 15 minutes to increase blood flow, circulation, desensitization, sensory re-education and for pain management. Pt instructed on performing active range of motion exercises while receiving heat to increase active motion.     Manual therapy techniques: Manual Lymphatic Drainage and Soft tissue Mobilization were applied to the: R hand for 10 minutes, including:   - Performed scar massage to decrease adhesions and improve tensile glide.   - myofascial static cupping X 2 minutes for loosening soft tissue and IASTM x 2 minutes to improve scar mobility on MP of LF and carpal tunnel scar.     Therapeutic exercises to develop endurance, ROM, and flexibility for 36 minutes, including:   - AROM in fluidotherapy x 15 min  - finger lifts x 15 reps   - pad and dab x 15 reps    - Resisted digit abd x 15 reps with green band  - Theraputty Yellow: gripping in supination, neutral,  pronation x 10 each, tripod pinch x 2 minutes @ home  - EDC ext with green band x 15 reps   - Pt performed wrist flexion and extension (red flexbar), supination and pronation (green flexbar), RD/UD with (red flexbar) x 15 reps each position to improve  strength and stabilization    - Wall-push ups 2 x 10 reps     Therapeutic activities to improve functional performance for 10 minutes, including:   - Octyi x 2 min   - Golf ball rolling x 2 min   - Hook dowel roll with pencil x 2 min    - Power web, red: pushes/pulls, finger pulls x 30 reps   - Issued mepitac for scar management     Patient Education and Home Exercises      Education provided:   - HEP in place  - Progress towards goals     Written Home Exercises Provided: Patient instructed to cont prior HEP.  Exercises were reviewed and Jayshree was able to demonstrate them prior to the end of the session.  Jayshree demonstrated good  understanding of the HEP provided. See EMR under Patient Instructions for exercises provided during therapy sessions.      ASSESSMENT     Pt. tolerated treatment session well including advances in strengthening. Awaiting further insurance authorization of more visits to progress as tolerated.     Jayshree is progressing well towards her goals and there are no updates to goals at this time. Pt prognosis is Good.     Pt will continue to benefit from skilled outpatient occupational therapy to address the deficits listed in the problem list on initial evaluation, provide pt/family education and to maximize pt's level of independence in the home and community environment.     Pt's spiritual, cultural and educational needs considered and pt agreeable to plan of care and goals.    Anticipated barriers to occupational therapy: healing    Goals:   ST-8  weeks :  1) Patient to be IND with HEP and modalities for pain managment. progressing not met 2024  2) Patient will  Increase Active Range of motion 8-10 degrees in hand/wrist to increase  functional hand use for ADLs/work/leisure activities. progressing not met 12/12/2024  3) Pt will  increase FOTO  intake score by 15 % to increase their hand/wrist functional ability. progressing not met 12/12/2024  4)Pt will increase  strength by 10-20 lbs. to improve functional grasp  for ADLs/work/leisure activities.  progressing not met 12/12/2024  5) Pt will increase pinch strength in key and lateral limited positions by 1-3 psi to assist with manipulation and fine motor task progressing not met 12/12/2024     LTG:   Goals to be met in 8-10  weeks:     1) Patient to be IND with HEP and modalities for pain management. progressing not met 12/12/2024  2) Patient will  Increase Active Range of motion 15-20 degrees in hand/wrist to increase functional hand use for ADLs/work/leisure activities. progressing not met 12/12/2024  3)Pt will increase  strength 25-30 lbs. to improve functional grasp for ADLs/work/leisure activities. progressing not met 12/12/2024  4) Pt will increase pinch strength in key and lateral pinch  limited positions by 3-5 psi psi to assist with manipulation and fine motor task. progressing not met 12/12/2024  5) Pt will increase  FOTO  intake score by 10 % to increase their hand/wrist functional ability. progressing not met 12/12/2024    PLAN     Continue skilled occupational therapy with individualized plan of care focusing on maximizing functional use of patient's right hand.    Updates/Grading for next session: progress as tolerated and per protocol.     Liz Holden, OTR/L, CHT

## 2024-12-16 RX ORDER — METHYLPREDNISOLONE ACETATE 40 MG/ML
40 INJECTION, SUSPENSION INTRA-ARTICULAR; INTRALESIONAL; INTRAMUSCULAR; SOFT TISSUE
Status: DISCONTINUED | OUTPATIENT
Start: 2024-12-11 | End: 2024-12-16 | Stop reason: HOSPADM

## 2024-12-19 ENCOUNTER — CLINICAL SUPPORT (OUTPATIENT)
Dept: REHABILITATION | Facility: HOSPITAL | Age: 64
End: 2024-12-19
Payer: COMMERCIAL

## 2024-12-19 DIAGNOSIS — M25.60 DECREASED RANGE OF MOTION: ICD-10-CM

## 2024-12-19 DIAGNOSIS — R53.1 WEAKNESS: Primary | ICD-10-CM

## 2024-12-19 PROCEDURE — 97110 THERAPEUTIC EXERCISES: CPT

## 2024-12-19 PROCEDURE — 97022 WHIRLPOOL THERAPY: CPT

## 2024-12-19 PROCEDURE — 97530 THERAPEUTIC ACTIVITIES: CPT

## 2024-12-19 PROCEDURE — 97140 MANUAL THERAPY 1/> REGIONS: CPT

## 2024-12-19 NOTE — PROGRESS NOTES
"  [facility]  Occupational Therapy Treatment Note    Date: 12/19/2024  Name: Jayshree Bee  [facility] Number: [id number]    Therapy Diagnosis:   Encounter Diagnoses   Name Primary?    Weakness Yes    Decreased range of motion      Physician: Halie Mata MD    Physician Orders: OT Eval and Treat  Medical Diagnosis: G56.01 (ICD-10-CM) - Right carpal tunnel syndrome; M65.331 (ICD-10-CM) - Trigger middle finger of right hand; M67.441 (ICD-10-CM) - Ganglion cyst of flexor tendon sheath of finger of right hand   Surgical Procedure and Date:   1. Right carpal tunnel release, cpt 41908  2. Right long finger flexor tenosynovectomy, cpt 31335  3. Ganglion cyst of tendon sheath excision right long finger, cpt 29224  4. Right long finger MP mass excision, cpt 74181, 10-8-2024   Evaluation Date: 10/18/2024   Insurance Authorization Period Expiration: 12/31/2024  Plan of Care Expiration: 1/18/2024  Date of Return to MD: 1/22/2025   Visit # / Visits authorized: 16 / 18  FOTO: 0/2 (did not take)     Precautions:  Standard    Time In: 10:00 am  Time Out: 10:53 am  Total Billable Time: 53 minutes     SUBJECTIVE     Pt reports: "Still some clicking but no catching."   She was compliant with home exercise program given last session.   Response to previous treatment: It went well   Functional change: able to wash dishes    Pain: 0/10 resting and 2-4/10 doing daily activities when gripping a lot   Location: right hand    OBJECTIVE   Objective Measures updated at progress report unless specified.    Observation/Appearance: Incisions well healed.     Sensation: Median Nerve Distribution    10/18/2024     Right   Monofilament Testing        Normal 1.65-2.83 Present   Diminished Light Touch 3.22-3.61 Present   Diminished Protective 3.84-4.31 Present   Loss of Protective 4.56-6.65 Present   Untestable >6.65 Present      Range of Motion:   Right Active    10/29/024 12/10/24   Long                            MP " Ext/Flex 0/90 (+5) (90) 0/90                         PIP Ext/Flex 0/100(+21) (90) 0/90                         DIP Ext/Flex 0/60 (+10) pain during passive stretching DIP  0/70(+10)   THUMB                            MP Ext/Flex 0/full 0/full                         IP Ext/Flex 0/full 0/full                         Carrera ABD 50 50                         Radial ABD 50 50                         Opposition To SF P1  To SF between PF and DPC       Comments:     Wrist ROM: Right  Active    10/29/2024 12/10/24   Extension 65(+10)  68 (+3)   Flexion 80  78 (-2)   Radial Deviation full full   Ulnar Deviation full full   Supination full full   Pronation full full       Strength (Dynamometer) and Pinch Strength (Pinch Gauge)  Measured in pounds to POP.   11/19/2024 11/19/2024 12/10/2024    Left Right Right   Rung II 48 36 (41) 41(+5)   3pt Pinch 7 7 (9) 9(+2)     Treatment     Jayshree received the treatments listed below:     Supervised modalities after being cleared for contradictions:   - Patient received fluidotherapy to right hand(s) for 15 minutes to increase blood flow, circulation, desensitization, sensory re-education and for pain management. Pt instructed on performing active range of motion exercises while receiving heat to increase active motion.     Manual therapy techniques: Manual Lymphatic Drainage and Soft tissue Mobilization were applied to the: R hand for 10 minutes, including:   - Performed scar massage to decrease adhesions and improve tensile glide.   - myofascial static cupping X 2 minutes for loosening soft tissue and IASTM x 2 minutes to improve scar mobility on MP of LF and carpal tunnel scar.     Therapeutic exercises to develop endurance, ROM, and flexibility for 31 minutes, including:   - AROM in fluidotherapy x 15 min  - finger lifts x 15 reps   - pad and dab x 15 reps    - Resisted digit abd x 15 reps with green band   - Theraputty Yellow: gripping in supination, neutral, pronation x 10 each,  tripod pinch x 2 minutes @ home  - EDC ext with green band x 15 reps   - Pt performed wrist flexion and extension (red flexbar), supination and pronation (green flexbar), RD/UD with (red flexbar) x 15 reps each position to improve  strength and stabilization    - Wall-push ups 2 x 10 reps     Therapeutic activities to improve functional performance for 12 minutes, including:   - Octyi x 2 min   - Golf ball rolling x 2 min   - Hook dowel roll with pencil x 2 min    - Power web, red: pushes/pulls, finger pulls x 30 reps   - Issued mepitac for scar management     Patient Education and Home Exercises      Education provided:   - HEP in place  - Progress towards goals     Written Home Exercises Provided: Patient instructed to cont prior HEP.  Exercises were reviewed and Jayshree was able to demonstrate them prior to the end of the session.  Jayshree demonstrated good  understanding of the HEP provided. See EMR under Patient Instructions for exercises provided during therapy sessions.      ASSESSMENT     Pt. tolerated treatment session well including advances in strengthening.     Jayshree is progressing well towards her goals and there are no updates to goals at this time. Pt prognosis is Good.     Pt will continue to benefit from skilled outpatient occupational therapy to address the deficits listed in the problem list on initial evaluation, provide pt/family education and to maximize pt's level of independence in the home and community environment.     Pt's spiritual, cultural and educational needs considered and pt agreeable to plan of care and goals.    Anticipated barriers to occupational therapy: healing    Goals:   ST-8  weeks :  1) Patient to be IND with HEP and modalities for pain managment. progressing not met 2024  2) Patient will  Increase Active Range of motion 8-10 degrees in hand/wrist to increase functional hand use for ADLs/work/leisure activities. progressing not met 2024  3) Pt will   increase FOTO  intake score by 15 % to increase their hand/wrist functional ability. progressing not met 12/19/2024  4)Pt will increase  strength by 10-20 lbs. to improve functional grasp  for ADLs/work/leisure activities.  progressing not met 12/19/2024  5) Pt will increase pinch strength in key and lateral limited positions by 1-3 psi to assist with manipulation and fine motor task progressing not met 12/19/2024     LTG:   Goals to be met in 8-10  weeks:     1) Patient to be IND with HEP and modalities for pain management. progressing not met 12/19/2024  2) Patient will  Increase Active Range of motion 15-20 degrees in hand/wrist to increase functional hand use for ADLs/work/leisure activities. progressing not met 12/19/2024  3)Pt will increase  strength 25-30 lbs. to improve functional grasp for ADLs/work/leisure activities. progressing not met 12/19/2024  4) Pt will increase pinch strength in key and lateral pinch  limited positions by 3-5 psi psi to assist with manipulation and fine motor task. progressing not met 12/19/2024  5) Pt will increase  FOTO  intake score by 10 % to increase their hand/wrist functional ability. progressing not met 12/19/2024    PLAN     Continue skilled occupational therapy with individualized plan of care focusing on maximizing functional use of patient's right hand.    Updates/Grading for next session: progress as tolerated and per protocol.     Liz Holden, OTR/L, CHT

## 2024-12-23 ENCOUNTER — CLINICAL SUPPORT (OUTPATIENT)
Dept: REHABILITATION | Facility: HOSPITAL | Age: 64
End: 2024-12-23
Payer: COMMERCIAL

## 2024-12-23 DIAGNOSIS — R53.1 WEAKNESS: Primary | ICD-10-CM

## 2024-12-23 DIAGNOSIS — M25.60 DECREASED RANGE OF MOTION: ICD-10-CM

## 2024-12-23 PROCEDURE — 97140 MANUAL THERAPY 1/> REGIONS: CPT

## 2024-12-23 PROCEDURE — 97110 THERAPEUTIC EXERCISES: CPT

## 2024-12-23 PROCEDURE — 97022 WHIRLPOOL THERAPY: CPT

## 2024-12-23 PROCEDURE — 97530 THERAPEUTIC ACTIVITIES: CPT

## 2024-12-23 NOTE — PROGRESS NOTES
"  [facility]  Occupational Therapy Treatment Note    Date: 12/23/2024  Name: Jayshree Bee  [facility] Number: [id number]    Therapy Diagnosis:   Encounter Diagnoses   Name Primary?    Weakness Yes    Decreased range of motion      Physician: Halie Mata MD    Physician Orders: OT Eval and Treat  Medical Diagnosis: G56.01 (ICD-10-CM) - Right carpal tunnel syndrome; M65.331 (ICD-10-CM) - Trigger middle finger of right hand; M67.441 (ICD-10-CM) - Ganglion cyst of flexor tendon sheath of finger of right hand   Surgical Procedure and Date:   1. Right carpal tunnel release, cpt 44866  2. Right long finger flexor tenosynovectomy, cpt 67674  3. Ganglion cyst of tendon sheath excision right long finger, cpt 83201  4. Right long finger MP mass excision, cpt 83473, 10-8-2024   Evaluation Date: 10/18/2024   Insurance Authorization Period Expiration: 12/31/2024  Plan of Care Expiration: 1/18/2024  Date of Return to MD: 1/22/2025   Visit # / Visits authorized: 17 / 18  FOTO: 0/2 (did not take)     Precautions:  Standard    Time In: 9:11 am  Time Out: 9:57 am  Total Billable Time: 46 minutes     SUBJECTIVE     Pt reports: "Still some clicking but no catching."   She was compliant with home exercise program given last session.   Response to previous treatment: It went well   Functional change: able to wash dishes    Pain: 0/10 resting and 2-4/10 doing daily activities when gripping a lot   Location: right hand    OBJECTIVE   Objective Measures updated at progress report unless specified.    Observation/Appearance: Incisions well healed.     Sensation: Median Nerve Distribution    10/18/2024     Right   Monofilament Testing        Normal 1.65-2.83 Present   Diminished Light Touch 3.22-3.61 Present   Diminished Protective 3.84-4.31 Present   Loss of Protective 4.56-6.65 Present   Untestable >6.65 Present      Range of Motion:   Right Active    10/29/024 12/10/24   Long                            MP " Ext/Flex 0/90 (+5) (90) 0/90                         PIP Ext/Flex 0/100(+21) (90) 0/90                         DIP Ext/Flex 0/60 (+10) pain during passive stretching DIP  0/70(+10)   THUMB                            MP Ext/Flex 0/full 0/full                         IP Ext/Flex 0/full 0/full                         Carrera ABD 50 50                         Radial ABD 50 50                         Opposition To SF P1  To SF between PF and DPC       Comments:     Wrist ROM: Right  Active    10/29/2024 12/10/24   Extension 65(+10)  68 (+3)   Flexion 80  78 (-2)   Radial Deviation full full   Ulnar Deviation full full   Supination full full   Pronation full full       Strength (Dynamometer) and Pinch Strength (Pinch Gauge)  Measured in pounds to POP.   11/19/2024 11/19/2024 12/10/2024    Left Right Right   Rung II 48 36 (41) 41(+5)   3pt Pinch 7 7 (9) 9(+2)     Treatment     Jayshree received the treatments listed below:     Supervised modalities after being cleared for contradictions:   - Patient received fluidotherapy to right hand(s) for 12 minutes to increase blood flow, circulation, desensitization, sensory re-education and for pain management. Pt instructed on performing active range of motion exercises while receiving heat to increase active motion.     Manual therapy techniques: Manual Lymphatic Drainage and Soft tissue Mobilization were applied to the: R hand for 8 minutes, including:   - Performed scar massage to decrease adhesions and improve tensile glide.   - myofascial static cupping X 2 minutes for loosening soft tissue and IASTM x 2 minutes to improve scar mobility on MP of LF and carpal tunnel scar.     Therapeutic exercises to develop endurance, ROM, and flexibility for 26 minutes, including:   - AROM in fluidotherapy x 12 min  - finger lifts x 15 reps   - Resisted digit abd x 15 reps with green band   - Theraputty Yellow: gripping in supination, neutral, pronation x 10 each, tripod pinch x 2 minutes @  home  - EDC ext with green band x 15 reps   - Pt performed wrist flexion and extension (red flexbar), supination and pronation (green flexbar), RD/UD with (red flexbar) x 15 reps each position to improve  strength and stabilization    - Wall-push ups 2 x 10 reps     Therapeutic activities to improve functional performance for 12 minutes, including:   - Octyi x 2 min   - Golf ball rolling x 2 min   - Hook dowel roll with pencil x 2 min    - Power web, green: pushes/pulls, finger pulls x 30 reps   - Issued mepitac for scar management     Patient Education and Home Exercises      Education provided:   - HEP in place  - Progress towards goals     Written Home Exercises Provided: Patient instructed to cont prior HEP.  Exercises were reviewed and Jayshree was able to demonstrate them prior to the end of the session.  Jayshree demonstrated good  understanding of the HEP provided. See EMR under Patient Instructions for exercises provided during therapy sessions.      ASSESSMENT     Pt. tolerated treatment session well including advances in strengthening. She has one authorized visit remaining.    Jayshree is progressing well towards her goals and there are no updates to goals at this time. Pt prognosis is Good.     Pt will continue to benefit from skilled outpatient occupational therapy to address the deficits listed in the problem list on initial evaluation, provide pt/family education and to maximize pt's level of independence in the home and community environment.     Pt's spiritual, cultural and educational needs considered and pt agreeable to plan of care and goals.    Anticipated barriers to occupational therapy: healing    Goals:   ST-8  weeks :  1) Patient to be IND with HEP and modalities for pain managment. progressing not met 2024  2) Patient will  Increase Active Range of motion 8-10 degrees in hand/wrist to increase functional hand use for ADLs/work/leisure activities. progressing not met 2024  3) Pt  will  increase FOTO  intake score by 15 % to increase their hand/wrist functional ability. progressing not met 12/23/2024  4)Pt will increase  strength by 10-20 lbs. to improve functional grasp  for ADLs/work/leisure activities.  progressing not met 12/23/2024  5) Pt will increase pinch strength in key and lateral limited positions by 1-3 psi to assist with manipulation and fine motor task progressing not met 12/23/2024     LTG:   Goals to be met in 8-10  weeks:     1) Patient to be IND with HEP and modalities for pain management. progressing not met 12/23/2024  2) Patient will  Increase Active Range of motion 15-20 degrees in hand/wrist to increase functional hand use for ADLs/work/leisure activities. progressing not met 12/23/2024  3)Pt will increase  strength 25-30 lbs. to improve functional grasp for ADLs/work/leisure activities. progressing not met 12/23/2024  4) Pt will increase pinch strength in key and lateral pinch  limited positions by 3-5 psi psi to assist with manipulation and fine motor task. progressing not met 12/23/2024  5) Pt will increase  FOTO  intake score by 10 % to increase their hand/wrist functional ability. progressing not met 12/23/2024    PLAN     Continue skilled occupational therapy with individualized plan of care focusing on maximizing functional use of patient's right hand.    Updates/Grading for next session: progress as tolerated and per protocol.     Liz Holden, OTR/L, CHT

## 2025-01-01 DIAGNOSIS — F51.01 PRIMARY INSOMNIA: ICD-10-CM

## 2025-01-01 NOTE — TELEPHONE ENCOUNTER
No care due was identified.  Health [facility] Embedded Care Due Messages. Reference number: [id number].   1/01/2025 12:25:33 AM CST

## 2025-01-03 DIAGNOSIS — M79.641 BILATERAL HAND PAIN: Primary | ICD-10-CM

## 2025-01-03 DIAGNOSIS — M79.642 BILATERAL HAND PAIN: Primary | ICD-10-CM

## 2025-01-03 RX ORDER — TIZANIDINE 2 MG/1
TABLET ORAL
Qty: 30 TABLET | Refills: 0 | Status: SHIPPED | OUTPATIENT
Start: 2025-01-03

## 2025-01-14 ENCOUNTER — PATIENT MESSAGE (OUTPATIENT)
Dept: ORTHOPEDICS | Facility: CLINIC | Age: 65
End: 2025-01-14
Payer: COMMERCIAL

## 2025-01-16 ENCOUNTER — PATIENT MESSAGE (OUTPATIENT)
Dept: ORTHOPEDICS | Facility: CLINIC | Age: 65
End: 2025-01-16
Payer: COMMERCIAL

## 2025-01-16 NOTE — TELEPHONE ENCOUNTER
Patient communication:    She feels 90% better at this time and would like to return to work 1/19/25 full duty.   She reports she does have some clicking of her long finger when she straightens out her finger. The swelling has decreased and the nerve pain has resolved.   She is still going to follow up next week for a final check.    Michael Garcia MS, OTC   Sports Medicine Assistant   [facility]  (P) [phone]  (F) [phone]

## 2025-01-21 ENCOUNTER — TELEPHONE (OUTPATIENT)
Dept: ORTHOPEDICS | Facility: CLINIC | Age: 65
End: 2025-01-21
Payer: COMMERCIAL

## 2025-01-21 NOTE — TELEPHONE ENCOUNTER
Called patient to notify of cancellation due to weather; reschedule appointment  to 2/12/25 @215 PM [facility] - 1st Floor [address] [facility], Please park in [address] and use [place] elevators 30 minutes prior to your appointment time for X-ray. After your X-ray please proceed to 9th Floor [address] for Appointment. Patient agreed and was pleasant.    Michael Garcia MS, OTC   Sports Medicine Assistant   [facility]  (P) [phone]  (F) [phone]

## 2025-02-02 DIAGNOSIS — F51.01 PRIMARY INSOMNIA: ICD-10-CM

## 2025-02-02 NOTE — TELEPHONE ENCOUNTER
No care due was identified.  Health [facility] Embedded Care Due Messages. Reference number: [id number].   2/02/2025 12:23:19 AM CST

## 2025-02-03 RX ORDER — TIZANIDINE 2 MG/1
TABLET ORAL
Qty: 30 TABLET | Refills: 0 | Status: SHIPPED | OUTPATIENT
Start: 2025-02-03

## 2025-02-07 ENCOUNTER — TELEPHONE (OUTPATIENT)
Dept: PRIMARY CARE CLINIC | Facility: CLINIC | Age: 65
End: 2025-02-07
Payer: COMMERCIAL

## 2025-02-07 RX ORDER — ACETAMINOPHEN 500 MG
500 TABLET ORAL
Status: CANCELLED | OUTPATIENT
Start: 2025-02-07

## 2025-02-07 RX ORDER — SODIUM CHLORIDE 0.9 % (FLUSH) 0.9 %
10 SYRINGE (ML) INJECTION
Status: CANCELLED | OUTPATIENT
Start: 2025-02-07

## 2025-02-07 RX ORDER — HEPARIN 100 UNIT/ML
500 SYRINGE INTRAVENOUS
Status: CANCELLED | OUTPATIENT
Start: 2025-02-07

## 2025-02-07 RX ORDER — ZOLEDRONIC ACID 5 MG/100ML
5 INJECTION, SOLUTION INTRAVENOUS
Status: CANCELLED | OUTPATIENT
Start: 2025-02-07

## 2025-02-11 ENCOUNTER — PATIENT MESSAGE (OUTPATIENT)
Dept: ORTHOPEDICS | Facility: CLINIC | Age: 65
End: 2025-02-11
Payer: COMMERCIAL

## 2025-02-12 ENCOUNTER — OFFICE VISIT (OUTPATIENT)
Dept: ORTHOPEDICS | Facility: CLINIC | Age: 65
End: 2025-02-12
Payer: COMMERCIAL

## 2025-02-12 ENCOUNTER — HOSPITAL ENCOUNTER (OUTPATIENT)
Dept: RADIOLOGY | Facility: OTHER | Age: 65
Discharge: HOME | End: 2025-02-12
Attending: ORTHOPAEDIC SURGERY
Payer: COMMERCIAL

## 2025-02-12 DIAGNOSIS — M79.642 BILATERAL HAND PAIN: ICD-10-CM

## 2025-02-12 DIAGNOSIS — M15.2 OSTEOARTHRITIS OF PROXIMAL INTERPHALANGEAL (PIP) JOINT OF RIGHT MIDDLE FINGER: Primary | ICD-10-CM

## 2025-02-12 DIAGNOSIS — M79.641 BILATERAL HAND PAIN: ICD-10-CM

## 2025-02-12 DIAGNOSIS — M18.0 PRIMARY OSTEOARTHRITIS OF BOTH FIRST CARPOMETACARPAL JOINTS: ICD-10-CM

## 2025-02-12 DIAGNOSIS — G89.29 CHRONIC PAIN OF RIGHT HAND: ICD-10-CM

## 2025-02-12 DIAGNOSIS — M79.641 CHRONIC PAIN OF RIGHT HAND: ICD-10-CM

## 2025-02-12 PROCEDURE — 73130 X-RAY EXAM OF HAND: CPT | Mod: TC,50,FY

## 2025-02-12 PROCEDURE — 3288F FALL RISK ASSESSMENT DOCD: CPT | Mod: CPTII,S$GLB,, | Performed by: ORTHOPAEDIC SURGERY

## 2025-02-12 PROCEDURE — 1101F PT FALLS ASSESS-DOCD LE1/YR: CPT | Mod: CPTII,S$GLB,, | Performed by: ORTHOPAEDIC SURGERY

## 2025-02-12 PROCEDURE — 20600 DRAIN/INJ JOINT/BURSA W/O US: CPT | Mod: RT,S$GLB,, | Performed by: ORTHOPAEDIC SURGERY

## 2025-02-12 PROCEDURE — 99214 OFFICE O/P EST MOD 30 MIN: CPT | Mod: 25,S$GLB,, | Performed by: ORTHOPAEDIC SURGERY

## 2025-02-12 PROCEDURE — 1125F AMNT PAIN NOTED PAIN PRSNT: CPT | Mod: CPTII,S$GLB,, | Performed by: ORTHOPAEDIC SURGERY

## 2025-02-12 PROCEDURE — 99999 PR PBB SHADOW E&M-EST. PATIENT-LVL II: CPT | Mod: PBBFAC,,, | Performed by: ORTHOPAEDIC SURGERY

## 2025-02-12 PROCEDURE — 73130 X-RAY EXAM OF HAND: CPT | Mod: 26,,, | Performed by: RADIOLOGY

## 2025-02-12 PROCEDURE — 1159F MED LIST DOCD IN RCRD: CPT | Mod: CPTII,S$GLB,, | Performed by: ORTHOPAEDIC SURGERY

## 2025-02-12 PROCEDURE — 1160F RVW MEDS BY RX/DR IN RCRD: CPT | Mod: CPTII,S$GLB,, | Performed by: ORTHOPAEDIC SURGERY

## 2025-02-12 RX ADMIN — METHYLPREDNISOLONE ACETATE 40 MG: 40 INJECTION, SUSPENSION INTRA-ARTICULAR; INTRALESIONAL; INTRAMUSCULAR; SOFT TISSUE at 02:02

## 2025-02-12 NOTE — PROCEDURES
Small Joint Aspiration/Injection: R long PIP    Date/Time: 2/12/2025 2:15 PM    Performed by: Halie Mata MD  Authorized by: Halie Mata MD    Consent Done?:  Yes (Verbal)  Indications:  Pain  Timeout: prior to procedure the correct patient, procedure, and site was verified    Prep: patient was prepped and draped in usual sterile fashion      Local anesthesia used?: Yes    Anesthesia:  Local infiltration  Local anesthetic:  Lidocaine 1% without epinephrine  Location:  Long finger  Site:  R long PIP  Needle size:  25 G  Medications:  40 mg methylPREDNISolone acetate 40 mg/mL  Patient tolerance:  Patient tolerated the procedure well with no immediate complications

## 2025-02-12 NOTE — PROGRESS NOTES
Jayshree Bee presents for follow up evaluation of   Encounter Diagnoses   Name Primary?    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    HPI:  Patient presents for follow-up regarding a thumb joint issue. She reports thumb joint soreness, rating the pain as 1/10 today. 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 She describes a clicking sensation when moving the thumb but denies locking. Patient notes pain when pressure is applied to the knuckle, stating that she feels pain when pressure is applied that she does not feel elsewhere.    She had surgery approximately 8 weeks ago, with the right hand being most affected. She continues with range of motion exercises to prevent stiffness but acknowledges not doing as many pinching exercises as recommended.     PREVIOUS TREATMENTS:  - Patient has been performing exercises to maintain joint mobility post-surgery, particularly focusing on bending exercises to prevent stiffness.    Vitals:    02/12/25 1428   PainSc:   1   PainLoc: Finger       PE:    AA&O x 4.  NAD  HEENT:  NCAT, sclera nonicteric  Lungs:  Respirations are equal and unlabored.  CV:  2+ bilateral upper and lower extremity pulses.  MSK:  tender to palpation right long finger PIP joint, decreased range of motion right long PIP joint. Neurovascularly intact bilaterally.  5/5 thenar and intrinsic musculature strength.            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     Assessment/Plan:   Encounter  Diagnoses   Name Primary?    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 We discussed the working diagnosis as well as several other potential alternative diagnoses. Treatment options were discussed, both conservative and surgical. Conservative treatment options would include things such as activity modifications, workplace modifications, a period of rest, oral vs topical OTC and prescription anti-inflammatory medications, occupational therapy, splinting/bracing, immobilization, corticosteroid injections, and others. Surgical options were discussed as well.     -I have offered her a selective injection. I have explained the risks, benefits, and alternatives of the procedure in detail.  The patient voices understanding and all questions have been answered. The patient agrees to proceed as planned. So after a sterile prep of the skin in the normal fashion the right long finger PIP joint injected using a 25 gauge needle with a combination of 1cc 1% plain lidocaine and 40 mg of methylprednisolone.  The patient is cautioned and immediate relief of pain is secondary to the local anesthetic and will be temporary.  After the anesthetic wears off there may be a increase in pain that may last for a few hours or a few days and they should use ice to help alleviate this flair up of pain. Patient tolerated the procedure well.    Will call in 2 weeks to follow up for steroid injection efficacy or sooner for any worsening of symptoms  Call with any questions/concerns in the interim           Halie Mata MD    Please be aware that this note has been generated with the assistance of [employer] voice-to-text.  Please excuse any spelling or grammatical errors.  This note was generated with the assistance of ambient listening technology. Verbal  consent was obtained by the patient and accompanying visitor(s) for the recording of patient appointment to facilitate this note. I attest to having reviewed and edited the generated note for accuracy, though some syntax or spelling errors may persist. Please contact the author of this note for any clarification.

## 2025-03-01 ENCOUNTER — ON-DEMAND VIRTUAL (OUTPATIENT)
Dept: URGENT CARE | Facility: CLINIC | Age: 65
End: 2025-03-01
Payer: COMMERCIAL

## 2025-03-01 DIAGNOSIS — J40 BRONCHITIS: Primary | ICD-10-CM

## 2025-03-01 PROCEDURE — 98005 SYNCH AUDIO-VIDEO EST LOW 20: CPT | Mod: 95,,, | Performed by: PHYSICIAN ASSISTANT

## 2025-03-01 RX ORDER — AZITHROMYCIN 250 MG/1
TABLET, FILM COATED ORAL
Qty: 6 TABLET | Refills: 0 | Status: SHIPPED | OUTPATIENT
Start: 2025-03-01 | End: 2025-03-06

## 2025-03-01 RX ORDER — PROMETHAZINE HYDROCHLORIDE AND DEXTROMETHORPHAN HYDROBROMIDE 6.25; 15 MG/5ML; MG/5ML
5 SYRUP ORAL EVERY 6 HOURS PRN
Qty: 100 ML | Refills: 0 | Status: SHIPPED | OUTPATIENT
Start: 2025-03-01 | End: 2025-03-11

## 2025-03-01 RX ORDER — IPRATROPIUM BROMIDE 21 UG/1
2 SPRAY, METERED NASAL 2 TIMES DAILY
Qty: 30 ML | Refills: 0 | Status: SHIPPED | OUTPATIENT
Start: 2025-03-01 | End: 2025-03-11

## 2025-03-01 NOTE — PATIENT INSTRUCTIONS
You have been diagnosed with bronchitis.   I have prescribed and antibiotic. Please start today and take until finished.   You can also take over the counter meds such as Flonase, Claritin, Dayquil etc.     If your symptoms persist please follow up with your pcp.  If you experience any severe symptoms go to the ER.      Thank you for choosing [facility]!    Our goal in the [facility] to always provide outstanding medical care. You may receive a survey by mail or e-mail in the next week regarding your experience today. We would greatly appreciate you completing and returning the survey. Your feedback provides us with a way to recognize our staff who provide very good care, and it helps us learn how to improve when your experience was below our aspiration of excellence.         We appreciate you trusting us with your medical care. We hope you feel better soon. We will be happy to take care of you for all of your future medical needs.    You must understand that you've received Virtual  treatment only and that you may be released before all your medical problems are known or treated. You, the patient, will arrange for follow up care as instructed.    Follow up with your PCP or specialty clinic as directed in the next 1-2 weeks if not improved or as needed.  You can call [phone] to schedule an appointment with the appropriate provider.    If your condition worsens we recommend that you receive another evaluation in person, with your primary care provider, urgent care or at the emergency room immediately or contact your primary medical clinics after hours call service to discuss your concerns.

## 2025-03-01 NOTE — PROGRESS NOTES
Subjective:      Patient ID: Jayshree Bee is a 64 y.o. female.    Vitals:  vitals were not taken for this visit.     Chief Complaint: Sinus Problem      Visit Type: TELE AUDIOVISUAL    Patient Location: Home     Present with the patient at the time of consultation: TELEMED PRESENT WITH PATIENT: None    Past Medical History:   Diagnosis Date    Breast cancer     DCIS RT breast    GERD (gastroesophageal reflux disease)     Osteoporosis      Past Surgical History:   Procedure Laterality Date    AUGMENTATION OF BREAST Bilateral 2002    AUGMENTATION OF BREAST Bilateral 2014    BREAST BIOPSY Right 2004    BREAST LUMPECTOMY Right 2004    BREAST SURGERY Bilateral 2002    CARPAL TUNNEL RELEASE Right 10/8/2024    Procedure: RELEASE, CARPAL TUNNEL;  Surgeon: Halie Mata MD;  Location: [facility] OR;  Service: Orthopedics;  Laterality: Right;  Local injection prior to prep     SECTION  3/17/1996    COSMETIC SURGERY  ,     Breast Augmentation, Breast Implant replacement and abdominoplasty    EXCISION OF GANGLION CYST OF HAND Right 10/8/2024    Procedure: EXCISION, GANGLION CYST, LONG FINGER;  Surgeon: Halie Mata MD;  Location: [facility] OR;  Service: Orthopedics;  Laterality: Right;    EXCISION OF MASS OF HAND Right 10/8/2024    Procedure: EXCISION, MASS, HAND;  Surgeon: Halie Mata MD;  Location: [facility] OR;  Service: Orthopedics;  Laterality: Right;    FRACTURE SURGERY  2014    Right Wrist    HYSTERECTOMY      complete    OOPHORECTOMY      w/ hysterectomy    SHOULDER SURGERY Right     rotator cuff/bicep tendon    TENOSYNOVECTOMY Right 10/8/2024    Procedure: TENOSYNOVECTOMY, LONG FINGER;  Surgeon: Halie Mata MD;  Location: [facility] OR;  Service: Orthopedics;  Laterality: Right;     Review of patient's allergies indicates:  No Known Allergies  Medications Ordered Prior to Encounter[1]  Family History   Problem Relation Name Age of Onset    Arthritis Mother Ariana Carter          Osteoarthritis    Diabetes Mother Ariana Carter     Hypertension Mother Ariana Carter     Osteoporosis Mother Ariana Carter     Early death Father Galen Carter         Heart Attack    Hearing loss Father Galen Carter     Heart disease Father Galen Carter         Triple bypass at 46/ Heart Attack death at 56    Hyperlipidemia Father Galen Carter     Hypertension Father Galen Carter     Cancer Paternal Aunt Loreto Carter         Ovarian Ca    Hyperlipidemia Paternal Aunt Loreto Carter     Cancer Paternal Aunt May Arreola         Leiomyosarcoma    Cancer Paternal Uncle Dae Cardozo         Lung Ca    Cancer Paternal Uncle Milo Cardozo         Lung Ca    Heart disease Paternal Uncle Yasmany Carter         Early Death    Arthritis Maternal Grandmother Carmen Cardozo         Crippling Arthritis  (RA)?    Cancer Maternal Grandfather Anna Cardozo         Stomach Cancer    Breast cancer Paternal Grandmother      Hearing loss Paternal Grandfather Hi Carter        Medications Ordered                [id number] [place] [facility] [address]   [address] [place] [address]    Telephone: [phone]   Fax: [phone]   Hours: Not open 24 hours                         E-Prescribed (3 of 3)              azithromycin (Z-ZEE) 250 MG tablet    Sig: Take 2 tablets by mouth on day 1; Take 1 tablet by mouth on days 2-5       Start: 3/1/25     Quantity: 6 tablet Refills: 0                         ipratropium (ATROVENT) 21 mcg (0.03 %) nasal spray    Si sprays by Each Nostril route 2 (two) times daily. for 10 days       Start: 3/1/25     Quantity: 30 mL Refills: 0                         promethazine-dextromethorphan (PROMETHAZINE-DM) 6.25-15 mg/5 mL Syrp    Sig: Take 5 mLs by mouth every 6 (six) hours as needed (cough).       Start: 3/1/25     Quantity: 100 mL Refills: 0                           Ohs Peq Odvv Intake    3/1/2025  9:48 AM CST - Filed by Patient   What is your current physical address in the event  of a medical emergency? [address] [address]   Are you able to take your vital signs? Yes   Systolic Blood Pressure:    Diastolic Blood Pressure:    Weight: 125   Height: 60   Pulse:    Temperature: 98   Respiration rate:    Pulse Oxygen:    Please attach any relevant images or files    Is your employer contracted with [facility]? No         Sinus Problem  This is a new problem. Episode onset: 5 days. The problem is unchanged. There has been no fever. Associated symptoms include congestion, coughing, headaches, a hoarse voice and sneezing. Pertinent negatives include no neck pain, shortness of breath, sinus pressure, sore throat or swollen glands. Past treatments include nasal decongestants. The treatment provided mild relief.       HENT:  Positive for congestion. Negative for sinus pressure and sore throat.    Neck: Negative for neck pain.   Respiratory:  Positive for cough. Negative for shortness of breath.    Allergic/Immunologic: Positive for sneezing.   Neurological:  Positive for headaches.        Objective:   The physical exam was conducted virtually.  Physical Exam  Normal appearance  Sinus congestion  Cough    Assessment:     1. Bronchitis        Plan:       Bronchitis    Other orders  -     azithromycin (Z-ZEE) 250 MG tablet; Take 2 tablets by mouth on day 1; Take 1 tablet by mouth on days 2-5  Dispense: 6 tablet; Refill: 0  -     promethazine-dextromethorphan (PROMETHAZINE-DM) 6.25-15 mg/5 mL Syrp; Take 5 mLs by mouth every 6 (six) hours as needed (cough).  Dispense: 100 mL; Refill: 0  -     ipratropium (ATROVENT) 21 mcg (0.03 %) nasal spray; 2 sprays by Each Nostril route 2 (two) times daily. for 10 days  Dispense: 30 mL; Refill: 0                          [1]   Current Outpatient Medications on File Prior to Visit   Medication Sig Dispense Refill    CELEBREX 200 mg capsule Take 200 mg by mouth 2 (two) times daily.      denosumab (PROLIA) 60 mg/mL Syrg Inject 60 mg into the skin  every 6 (six) months.      famotidine (PEPCID) 10 MG tablet Take 10 mg by mouth 2 (two) times daily.      nitrofurantoin (MACRODANTIN) 100 MG capsule Take 1 capsule (100 mg total) by mouth nightly. 30 capsule 5    ondansetron (ZOFRAN) 8 MG tablet Take 1 tablet (8 mg total) by mouth every 8 (eight) hours as needed for Nausea. 25 tablet 0    pantoprazole (PROTONIX) 40 MG tablet Take 1 tablet by mouth once daily.      prasterone, dhea, (INTRAROSA) 6.5 mg Inst Place 1 suppository vaginally every evening. 28 each 11    tiZANidine (ZANAFLEX) 2 MG tablet TAKE 1 TABLET BY MOUTH EVERY DAY AT NIGHT AS NEEDED 30 tablet 0    traMADoL (ULTRAM) 50 mg tablet Take 1 tablet (50 mg total) by mouth every 6 (six) hours. 25 tablet 0    valACYclovir (VALTREX) 500 MG tablet Take 1 tablet (500 mg total) by mouth 2 (two) times daily. 60 tablet 11     No current facility-administered medications on file prior to visit.

## 2025-03-08 DIAGNOSIS — F51.01 PRIMARY INSOMNIA: ICD-10-CM

## 2025-03-08 NOTE — TELEPHONE ENCOUNTER
No care due was identified.  [facility] Embedded Care Due Messages. Reference number: [id number].   3/08/2025 12:17:44 AM CST

## 2025-03-10 RX ORDER — TIZANIDINE 2 MG/1
2 TABLET ORAL NIGHTLY PRN
Qty: 30 TABLET | Refills: 0 | Status: SHIPPED | OUTPATIENT
Start: 2025-03-10

## 2025-03-22 RX ORDER — METHYLPREDNISOLONE ACETATE 40 MG/ML
40 INJECTION, SUSPENSION INTRA-ARTICULAR; INTRALESIONAL; INTRAMUSCULAR; SOFT TISSUE
Status: DISCONTINUED | OUTPATIENT
Start: 2025-02-12 | End: 2025-03-22 | Stop reason: HOSPADM

## 2025-04-04 ENCOUNTER — TELEPHONE (OUTPATIENT)
Dept: ORTHOPEDICS | Facility: CLINIC | Age: 65
End: 2025-04-04
Payer: COMMERCIAL

## 2025-04-04 NOTE — TELEPHONE ENCOUNTER
Patient is having continued relief from right long PIP joint steroid injection February 2, 2025    She reports mild left thumb pain at this time and would like to come and see us in June for an appointment and possible injection of her left thumb CMC joint    Scheduled appointment for patient      Michael Garcia MS, OTC   Sports Medicine Assistant   [facility]  (P) [phone]  (F) [phone]

## 2025-04-09 DIAGNOSIS — F51.01 PRIMARY INSOMNIA: ICD-10-CM

## 2025-04-09 RX ORDER — TIZANIDINE 2 MG/1
2 TABLET ORAL NIGHTLY PRN
Qty: 30 TABLET | Refills: 0 | Status: SHIPPED | OUTPATIENT
Start: 2025-04-09

## 2025-04-22 ENCOUNTER — TELEPHONE (OUTPATIENT)
Dept: PRIMARY CARE CLINIC | Facility: CLINIC | Age: 65
End: 2025-04-22
Payer: COMMERCIAL

## 2025-04-22 NOTE — TELEPHONE ENCOUNTER
----- Message from Nikki sent at 4/21/2025  6:06 PM CDT -----  Regarding: PT ADVICE  Contact: PT  Pt called regarding scheduling her annual appt. Pt is scheduled for 8.27.25, but would like to be seen much sooner. Unable to schedule before that date.Please advise. Pt can be reached at [phone]

## 2025-05-10 DIAGNOSIS — F51.01 PRIMARY INSOMNIA: ICD-10-CM

## 2025-05-11 ENCOUNTER — RESULTS FOLLOW-UP (OUTPATIENT)
Dept: PRIMARY CARE CLINIC | Facility: CLINIC | Age: 65
End: 2025-05-11

## 2025-05-12 RX ORDER — TIZANIDINE 2 MG/1
2 TABLET ORAL NIGHTLY PRN
Qty: 30 TABLET | Refills: 2 | Status: SHIPPED | OUTPATIENT
Start: 2025-05-12

## 2025-05-15 ENCOUNTER — OFFICE VISIT (OUTPATIENT)
Dept: PRIMARY CARE CLINIC | Facility: CLINIC | Age: 65
End: 2025-05-15
Payer: COMMERCIAL

## 2025-05-15 VITALS
DIASTOLIC BLOOD PRESSURE: 70 MMHG | OXYGEN SATURATION: 97 % | HEIGHT: 60 IN | WEIGHT: 119.69 LBS | SYSTOLIC BLOOD PRESSURE: 114 MMHG | HEART RATE: 74 BPM | BODY MASS INDEX: 23.5 KG/M2

## 2025-05-15 DIAGNOSIS — Z00.00 ANNUAL PHYSICAL EXAM: Primary | ICD-10-CM

## 2025-05-15 DIAGNOSIS — E78.2 HYPERLIPIDEMIA, MIXED: ICD-10-CM

## 2025-05-15 DIAGNOSIS — M81.0 AGE-RELATED OSTEOPOROSIS WITHOUT CURRENT PATHOLOGICAL FRACTURE: ICD-10-CM

## 2025-05-15 DIAGNOSIS — Z85.3 HISTORY OF BREAST CANCER: ICD-10-CM

## 2025-05-15 DIAGNOSIS — F51.01 PRIMARY INSOMNIA: ICD-10-CM

## 2025-05-15 DIAGNOSIS — K21.9 GERD WITHOUT ESOPHAGITIS: ICD-10-CM

## 2025-05-15 PROCEDURE — 99999 PR PBB SHADOW E&M-EST. PATIENT-LVL III: CPT | Mod: PBBFAC,,, | Performed by: NURSE PRACTITIONER

## 2025-05-15 PROCEDURE — 99397 PER PM REEVAL EST PAT 65+ YR: CPT | Mod: S$GLB,,, | Performed by: NURSE PRACTITIONER

## 2025-05-15 PROCEDURE — 3074F SYST BP LT 130 MM HG: CPT | Mod: CPTII,S$GLB,, | Performed by: NURSE PRACTITIONER

## 2025-05-15 PROCEDURE — 3078F DIAST BP <80 MM HG: CPT | Mod: CPTII,S$GLB,, | Performed by: NURSE PRACTITIONER

## 2025-05-15 PROCEDURE — 3008F BODY MASS INDEX DOCD: CPT | Mod: CPTII,S$GLB,, | Performed by: NURSE PRACTITIONER

## 2025-05-15 PROCEDURE — 1159F MED LIST DOCD IN RCRD: CPT | Mod: CPTII,S$GLB,, | Performed by: NURSE PRACTITIONER

## 2025-05-15 RX ORDER — SEMAGLUTIDE 1.34 MG/ML
0.25 INJECTION, SOLUTION SUBCUTANEOUS
COMMUNITY

## 2025-05-15 NOTE — PROGRESS NOTES
[facility] Note    Chief Complaint      Chief Complaint   Patient presents with    Annual Exam       History of Present Illness      Jayshree Bee is a 64 y.o. female with chronic conditions of arthritis, depression, murmur, urinary incontinence, osteoporosis who presents today for: annual preventative exam. Generally feels well.   Diet: has been watching portions. Cooks mostly at home. Drinks plenty of water  Exercise: stays active with 5 year old grandson, caretaker.  Nonsmoker. Does not drink and drive. No more than 4 drinks on occasion. Wears seatbelt.   Has been using Ozempic 0.25 as needed.  Previous A1C 5.9%, has improved to 5.6%. has lost 10 lbs since starting, no side effects reported. had labs from [facility]. Will try to shop around for prices  Pt also did a sleep study (2024), pt does have mild sleep apnea. Pt does have a CPAP machine. Compliant with machine.     Gerd:  controlled on pantoprazole, does have osteoporosis. Had previous EGD, showing no ulcer.  Osteoporosis: Sees Dr. Leonard, endocrinology.  Has tried actonel and recently reclast but caused severe flare of bilateral hand arthritis pains.  Takes calcium and Vit D (0760-3326 IU daily).  Light weight bearing exercise.  now on Prolia.  Hx of breast cancer: Dx 2004.  S/p lumpectomy.  S/P 5 yrs tamoxifen and 6 weeks of XRT.    HLD:  . Sees Dr. Glover.       Flu shot 2022.  TdAP 2020.  COVID vaccine UTD, discussed booster. Discussed pneumovax and shingles vaccine, pt can get at local pharmacy.   Mammogram UTD 2024.  PAP smear n/a 2/2 hysterectomy. DEXA 2024, high FRAX osteopenia.  Cscope UTD with Dr. Rao, no polyps, 10 yr interval.      Past Medical History:  Past Medical History:   Diagnosis Date    Anxiety 2019    Family situation changed    Breast cancer 2004    DCIS RT breast    Depression 2019    Family situation changed    Dyspareunia 2014?    Tried Estring didnt help    Encounter for blood transfusion 2023    GIBleed post  endoscopy    GERD (gastroesophageal reflux disease)     Heart murmur     Cardiologist noted    Hormone disorder     Cant have hormones bc Ca    Osteoporosis     Urinary incontinence     When coughing       Past Surgical History:   has a past surgical history that includes Breast surgery (Bilateral, ); Breast lumpectomy (Right, ); Breast biopsy (Right, ); Hysterectomy (); Oophorectomy (); Augmentation of breast (Bilateral, ); Augmentation of breast (Bilateral, );  section (3/17/1996); Fracture surgery (2014); Cosmetic surgery (, ); Shoulder surgery (Right); Carpal tunnel release (Right, 10/08/2024); Excision of ganglion cyst of hand (Right, 10/08/2024); Tenosynovectomy (Right, 10/08/2024); Excision of mass of hand (Right, 10/08/2024); Abdominal surgery (); and Rhizotomy ().    Family History:  family history includes Arthritis in her maternal grandmother and mother; Breast cancer in her paternal grandmother; Cancer in her maternal grandfather, maternal uncle, maternal uncle, paternal aunt, paternal aunt, paternal uncle, and paternal uncle; Diabetes in her mother; Early death in her father; Hearing loss in her father, mother, and paternal grandfather; Heart disease in her father and paternal uncle; Hyperlipidemia in her father and paternal aunt; Hypertension in her brother, brother, father, mother, sister, and sister; Lung cancer in her paternal uncle and paternal uncle; Osteoarthritis in her mother; Osteoporosis in her mother; Ovarian cancer in her paternal aunt; Stomach cancer in her maternal grandfather.     Social History:  Social History[1]    Review of Systems   Constitutional:  Negative for chills and fever.   Respiratory:  Negative for cough and shortness of breath.    Cardiovascular:  Negative for chest pain and palpitations.   Gastrointestinal:  Negative for constipation, diarrhea, nausea and vomiting.   Genitourinary:  Negative for dysuria  and hematuria.   Musculoskeletal:  Negative for falls.   Neurological:  Negative for headaches.        Medications:  Encounter Medications[2]    Allergies:  Review of patient's allergies indicates:  No Known Allergies    Health Maintenance:  Immunization History   Administered Date(s) Administered    COVID-19, MRNA, LN-S, PF (Pfizer) (Purple Cap) 01/17/2021, 02/07/2021    Hepatitis B (recombinant) Adjuvanted, 2 dose 06/04/2019, 07/05/2019    Influenza - Quadrivalent - PF *Preferred* (6 months and older) 12/04/2017, 12/06/2018, 11/08/2019, 11/03/2020, 09/21/2022    Influenza - Trivalent - Fluad - Adjuvanted - PF (65 years and older 11/12/2024    PPD Test 05/14/2019    Tdap 06/13/2020      Health Maintenance   Topic Date Due    Hepatitis C Screening  Never done    Shingles Vaccine (1 of 2) Never done    Pneumococcal Vaccines (Age 50+) (1 of 1 - PCV) Never done    COVID-19 Vaccine (3 - 2024-25 season) 09/01/2024    Mammogram  05/06/2026    DEXA Scan  04/11/2027    Lipid Panel  03/14/2029    TETANUS VACCINE  06/13/2030    Colorectal Cancer Screening  11/02/2033    RSV Vaccine (Age 60+ and Pregnant patients) (1 - 1-dose 75+ series) 12/30/2033    Influenza Vaccine  Completed        Physical Exam      Vital Signs  Pulse: 74  SpO2: 97 %  BP: 114/70  BP Location: Left arm  Patient Position: Sitting  Height and Weight  Height: 5' (152.4 cm)  Weight: 54.3 kg (119 lb 11.4 oz)  BSA (Calculated - sq m): 1.52 sq meters  BMI (Calculated): 23.4  Weight in (lb) to have BMI = 25: 127.7]    Physical Exam  Constitutional:       Appearance: She is well-developed.   HENT:      Head: Normocephalic and atraumatic.   Cardiovascular:      Rate and Rhythm: Normal rate and regular rhythm.      Heart sounds: Normal heart sounds. No murmur heard.  Pulmonary:      Effort: Pulmonary effort is normal. No respiratory distress.      Breath sounds: Normal breath sounds.   Abdominal:      General: There is no distension.      Palpations: Abdomen is  soft.      Tenderness: There is no abdominal tenderness. There is no guarding.   Skin:     General: Skin is warm and dry.   Neurological:      Mental Status: She is alert. Mental status is at baseline.   Psychiatric:         Behavior: Behavior normal.          Laboratory:  CBC:  Recent Labs   Lab 05/17/23  0753 03/14/24  0843   WBC 7.96 4.65   RBC 4.11 4.32   Hemoglobin 11.4 L 12.2   Hematocrit 37.6 39.5   Platelets 317 308   MCV 92 91   MCH 27.7 28.2   MCHC 30.3 L 30.9 L     CMP:  Recent Labs   Lab 03/14/24  0843   Glucose 88   Calcium 9.2   Albumin 4.0   Total Protein 7.0   Sodium 142   Potassium 4.3   CO2 25   Chloride 108   BUN 11   Alkaline Phosphatase 64   ALT 21   AST 21   Total Bilirubin 0.4     URINALYSIS:       LIPIDS:  Recent Labs   Lab 08/24/22  0000 09/15/23  0000 03/14/24  0843   HDL 69 64 56   Cholesterol 193 186 184   Triglycerides 100 118 120   LDL Cholesterol  --   --  104.0   LDL Calculated 116 112  --    HDL/Cholesterol Ratio  --   --  30.4   Non-HDL Cholesterol  --   --  128   Total Cholesterol/HDL Ratio  --   --  3.3     TSH:      A1C:  Recent Labs   Lab 08/24/22  0000   Hemoglobin A1C 5.2       Assessment/Plan     Jayshree Bee is a 64 y.o.female with:    1. Annual physical exam  Reviewed labs from [facility]. Unremarkable.   Counseled on diet and exercise  Discussed preventative screening, cancer screening and immunization    2. Primary insomnia  Stable. Continue current meds.     3. History of breast cancer  Stable. Continue follow up with specialist.   Mammogram UTD    4. Age-related osteoporosis without current pathological fracture  Stable. Continue current meds.     5. Hyperlipidemia, mixed  Stable.  Continue current meds.      6. GERD without esophagitis  Stable.Continue current meds.       Chronic conditions status updated as per HPI.  Other than changes above, cont current medications and maintain follow up with specialists.  No follow-ups on file.    Future Appointments   Date Time  Provider Department Center   6/9/2025 11:15 AM Halie Mata MD [facility] [place] [facility]   8/27/2025  8:00 AM Cortez Miranda MD [facility] [place]   9/16/2025 10:00 AM CHAIR 01, [facility] [place]       ERNESTINA Travis  [facility]                       [1]   Social History  Tobacco Use    Smoking status: Never    Smokeless tobacco: Never   Substance Use Topics    Alcohol use: Not Currently    Drug use: Not Currently   [2]   Outpatient Encounter Medications as of 5/15/2025   Medication Sig Note Dispense Refill    denosumab (PROLIA) 60 mg/mL Syrg Inject 60 mg into the skin every 6 (six) months.       famotidine (PEPCID) 10 MG tablet Take 10 mg by mouth 2 (two) times daily.       pantoprazole (PROTONIX) 40 MG tablet Take 1 tablet by mouth once daily.       prasterone, dhea, (INTRAROSA) 6.5 mg Inst Place 1 suppository vaginally every evening.  28 each 11    semaglutide (OZEMPIC) 0.25 mg or 0.5 mg(2 mg/1.5 mL) pen injector Inject 0.25 mg into the skin every 7 days.       tiZANidine (ZANAFLEX) 2 MG tablet TAKE 1 TABLET BY MOUTH EVERY DAY AT NIGHT AS NEEDED  30 tablet 2    [DISCONTINUED] CELEBREX 200 mg capsule Take 200 mg by mouth 2 (two) times daily.       [DISCONTINUED] nitrofurantoin (MACRODANTIN) 100 MG capsule Take 1 capsule (100 mg total) by mouth nightly.  30 capsule 5    [DISCONTINUED] ondansetron (ZOFRAN) 8 MG tablet Take 1 tablet (8 mg total) by mouth every 8 (eight) hours as needed for Nausea.  25 tablet 0    [DISCONTINUED] tiZANidine (ZANAFLEX) 2 MG tablet TAKE 1 TABLET BY MOUTH EVERY DAY AT NIGHT AS NEEDED  30 tablet 0    [DISCONTINUED] traMADoL (ULTRAM) 50 mg tablet Take 1 tablet (50 mg total) by mouth every 6 (six) hours.  25 tablet 0    [DISCONTINUED] valACYclovir (VALTREX) 500 MG tablet Take 1 tablet (500 mg total) by mouth 2 (two) times daily. 8/22/2024: Prn only for fever blisters 60 tablet 11     No facility-administered encounter medications on file as of  5/15/2025.

## 2025-06-05 ENCOUNTER — TELEPHONE (OUTPATIENT)
Dept: ORTHOPEDICS | Facility: CLINIC | Age: 65
End: 2025-06-05
Payer: COMMERCIAL

## 2025-06-07 DIAGNOSIS — N39.0 FREQUENT UTI: ICD-10-CM

## 2025-06-07 RX ORDER — NITROFURANTOIN (MACROCRYSTALS) 100 MG/1
CAPSULE ORAL
Qty: 30 CAPSULE | Refills: 5 | Status: SHIPPED | OUTPATIENT
Start: 2025-06-07

## 2025-06-07 NOTE — TELEPHONE ENCOUNTER
Refill Routing Note   Medication(s) are not appropriate for processing by [facility] for the following reason(s):        Outside of protocol    [facility] action(s):  Route               Appointments  past 12m or future 3m with PCP    Date Provider   Last Visit   5/17/2024 Maude Ruiz MD   Next Visit   Visit date not found Maude Ruiz MD   ED visits in past 90 days: 0        Note composed:10:02 AM 06/07/2025

## 2025-06-09 ENCOUNTER — HOSPITAL ENCOUNTER (OUTPATIENT)
Dept: RADIOLOGY | Facility: HOSPITAL | Age: 65
Discharge: HOME | End: 2025-06-09
Attending: ORTHOPAEDIC SURGERY
Payer: COMMERCIAL

## 2025-06-09 ENCOUNTER — OFFICE VISIT (OUTPATIENT)
Dept: ORTHOPEDICS | Facility: CLINIC | Age: 65
End: 2025-06-09
Payer: COMMERCIAL

## 2025-06-09 DIAGNOSIS — M18.12 PRIMARY OSTEOARTHRITIS OF FIRST CARPOMETACARPAL JOINT OF LEFT HAND: ICD-10-CM

## 2025-06-09 DIAGNOSIS — M79.642 CHRONIC PAIN OF LEFT HAND: ICD-10-CM

## 2025-06-09 DIAGNOSIS — M18.12 PRIMARY OSTEOARTHRITIS OF FIRST CARPOMETACARPAL JOINT OF LEFT HAND: Primary | ICD-10-CM

## 2025-06-09 DIAGNOSIS — G89.29 CHRONIC PAIN OF LEFT HAND: ICD-10-CM

## 2025-06-09 PROCEDURE — 73130 X-RAY EXAM OF HAND: CPT | Mod: 26,LT,, | Performed by: RADIOLOGY

## 2025-06-09 PROCEDURE — 99214 OFFICE O/P EST MOD 30 MIN: CPT | Mod: 25,S$GLB,, | Performed by: ORTHOPAEDIC SURGERY

## 2025-06-09 PROCEDURE — 20600 DRAIN/INJ JOINT/BURSA W/O US: CPT | Mod: LT,S$GLB,, | Performed by: ORTHOPAEDIC SURGERY

## 2025-06-09 PROCEDURE — 99999 PR PBB SHADOW E&M-EST. PATIENT-LVL III: CPT | Mod: PBBFAC,,, | Performed by: ORTHOPAEDIC SURGERY

## 2025-06-09 PROCEDURE — 3288F FALL RISK ASSESSMENT DOCD: CPT | Mod: CPTII,S$GLB,, | Performed by: ORTHOPAEDIC SURGERY

## 2025-06-09 PROCEDURE — 1125F AMNT PAIN NOTED PAIN PRSNT: CPT | Mod: CPTII,S$GLB,, | Performed by: ORTHOPAEDIC SURGERY

## 2025-06-09 PROCEDURE — 73130 X-RAY EXAM OF HAND: CPT | Mod: TC,LT

## 2025-06-09 PROCEDURE — 1101F PT FALLS ASSESS-DOCD LE1/YR: CPT | Mod: CPTII,S$GLB,, | Performed by: ORTHOPAEDIC SURGERY

## 2025-06-09 PROCEDURE — 1159F MED LIST DOCD IN RCRD: CPT | Mod: CPTII,S$GLB,, | Performed by: ORTHOPAEDIC SURGERY

## 2025-06-09 PROCEDURE — 1160F RVW MEDS BY RX/DR IN RCRD: CPT | Mod: CPTII,S$GLB,, | Performed by: ORTHOPAEDIC SURGERY

## 2025-06-09 RX ADMIN — METHYLPREDNISOLONE ACETATE 40 MG: 40 INJECTION, SUSPENSION INTRA-ARTICULAR; INTRALESIONAL; INTRAMUSCULAR; SOFT TISSUE at 11:06

## 2025-06-09 NOTE — PROGRESS NOTES
"Jayshree Bee presents for follow up evaluation of   Encounter Diagnoses   Name Primary?    Chronic pain of left hand     Primary osteoarthritis of first carpometacarpal joint of left hand Yes     History of Present Illness    HPI:  Patient presents for follow-up regarding her hand condition. Her hand remains swollen, with fluctuating severity. She reports occasional clicking that does not stick or occur consistently and does not bother her. She feels superficial scar tissue that "pops" but clarifies it is not on the tendon and does not cause pain. She attributes this to residual effects from a previous procedure.    She has stopped her roll-up exercises, which she acknowledges needing to resume. She can still touch her middle finger as before, but notes her hand is more swollen. Frequent lifting of her 5-year-old grandson, especially during swimming lessons, has been affecting her hand.    For treatment, she uses a brace, particularly at night to prevent certain movements. She also uses rigid splints when lifting heavy pots or washing dishes. She takes naproxen, noting her hand is more active or swollen when she is doing a lot of activities.    Vitals:    25 1130   PainSc:   5   PainLoc: Hand       PE:    AA&O x 4.  NAD  HEENT:  NCAT, sclera nonicteric  Lungs:  Respirations are equal and unlabored.  CV:  2+ bilateral upper and lower extremity pulses.    MSK: + grind test left basilar thumb joint.  Neurovascularly intact bilaterally.  5/5 thenar and intrinsic musculature strength.  Full range of motion hands, wrists and elbows.    Diagnostic studies and other clinical records review:  X-rays AP, lateral and oblique left hand taken today are independently reviewed by me and shows Eaton stage II basilar thumb arthritis.     EM24 shows Mild right carpal tunnel syndrome    Assessment/Plan:   Encounter Diagnoses   Name Primary?    Chronic pain of left hand     Primary osteoarthritis of first " carpometacarpal joint of left hand Yes     The patient and I had a thorough discussion today. We discussed the working diagnosis as well as several other potential alternative diagnoses. Treatment options were discussed, both conservative and surgical. Conservative treatment options would include things such as activity modifications, workplace modifications, a period of rest, oral vs topical OTC and prescription anti-inflammatory medications, occupational therapy, splinting/bracing, immobilization, corticosteroid injections, and others. Surgical options were discussed as well.    -I have offered her a selective injection. I have explained the risks, benefits, and alternatives of the procedure in detail.  The patient voices understanding and all questions have been answered. The patient agrees to proceed as planned. So after a sterile prep of the skin in the normal fashion the left thumb CMC joint injected using a 25 gauge needle with a combination of 1cc 1% plain lidocaine and 40 mg of methylprednisolone.  The patient is cautioned and immediate relief of pain is secondary to the local anesthetic and will be temporary.  After the anesthetic wears off there may be a increase in pain that may last for a few hours or a few days and they should use ice to help alleviate this flair up of pain. Patient tolerated the procedure well.    Will call in 2 weeks to follow up for steroid injection efficacy or sooner for any worsening of symptoms    PATIENT INSTRUCTIONS:  - Use thumb brace at night to prevent excessive movement.  - Use thumb brace for support when lifting heavy objects  - Utilize hard, skeleton-like braces (green ones) when lifting heavy pots or washing dishes.  - Continue performing finger roll-up exercises.     Call with any questions/concerns in the interim        Halie Mata MD    Please be aware that this note has been generated with the assistance of [facility] voice-to[facility]text.  Please excuse any spelling or  grammatical errors.  This note was generated with the assistance of ambient listening technology. Verbal consent was obtained by the patient and accompanying visitor(s) for the recording of patient appointment to facilitate this note. I attest to having reviewed and edited the generated note for accuracy, though some syntax or spelling errors may persist. Please contact the author of this note for any clarification.

## 2025-06-09 NOTE — PROCEDURES
Small Joint Aspiration/Injection: L thumb CMC    Date/Time: 6/9/2025 11:15 AM    Performed by: Halie Mata MD  Authorized by: Halie Mata MD    Consent Done?:  Yes (Verbal)  Indications:  Pain  Timeout: prior to procedure the correct patient, procedure, and site was verified    Prep: patient was prepped and draped in usual sterile fashion      Local anesthesia used?: Yes    Anesthesia:  Local infiltration  Local anesthetic:  Lidocaine 1% without epinephrine  Location:  Thumb  Site:  L thumb CMC  Needle size:  25 G  Medications:  40 mg methylPREDNISolone acetate 40 mg/mL  Patient tolerance:  Patient tolerated the procedure well with no immediate complications

## 2025-06-22 RX ORDER — METHYLPREDNISOLONE ACETATE 40 MG/ML
40 INJECTION, SUSPENSION INTRA-ARTICULAR; INTRALESIONAL; INTRAMUSCULAR; SOFT TISSUE
Status: DISCONTINUED | OUTPATIENT
Start: 2025-06-09 | End: 2025-06-22 | Stop reason: HOSPADM

## 2025-08-05 DIAGNOSIS — F51.01 PRIMARY INSOMNIA: ICD-10-CM

## 2025-08-05 RX ORDER — TIZANIDINE 2 MG/1
2 TABLET ORAL NIGHTLY PRN
Qty: 30 TABLET | Refills: 2 | Status: SHIPPED | OUTPATIENT
Start: 2025-08-05

## 2025-08-05 NOTE — TELEPHONE ENCOUNTER
No care due was identified.  [facility] Embedded Care Due Messages. Reference number: [id number].   8/05/2025 12:15:38 AM CDT

## 2025-08-26 ENCOUNTER — TELEPHONE (OUTPATIENT)
Dept: RESEARCH | Facility: HOSPITAL | Age: 65
End: 2025-08-26
Payer: COMMERCIAL

## 2025-08-27 ENCOUNTER — OFFICE VISIT (OUTPATIENT)
Dept: PRIMARY CARE CLINIC | Facility: CLINIC | Age: 65
End: 2025-08-27
Payer: COMMERCIAL

## 2025-08-27 VITALS
WEIGHT: 108.94 LBS | HEART RATE: 70 BPM | DIASTOLIC BLOOD PRESSURE: 80 MMHG | SYSTOLIC BLOOD PRESSURE: 110 MMHG | HEIGHT: 60 IN | OXYGEN SATURATION: 98 % | BODY MASS INDEX: 21.39 KG/M2

## 2025-08-27 DIAGNOSIS — K21.9 GERD WITHOUT ESOPHAGITIS: ICD-10-CM

## 2025-08-27 DIAGNOSIS — E78.2 HYPERLIPIDEMIA, MIXED: ICD-10-CM

## 2025-08-27 DIAGNOSIS — Z85.3 HISTORY OF BREAST CANCER: Primary | ICD-10-CM

## 2025-08-27 DIAGNOSIS — R73.01 IMPAIRED FASTING GLUCOSE: ICD-10-CM

## 2025-08-27 DIAGNOSIS — M81.0 AGE-RELATED OSTEOPOROSIS WITHOUT CURRENT PATHOLOGICAL FRACTURE: ICD-10-CM

## 2025-08-27 PROCEDURE — 99999 PR PBB SHADOW E&M-EST. PATIENT-LVL III: CPT | Mod: PBBFAC,,, | Performed by: INTERNAL MEDICINE

## 2025-08-28 ENCOUNTER — LAB VISIT (OUTPATIENT)
Dept: LAB | Facility: HOSPITAL | Age: 65
End: 2025-08-28
Attending: INTERNAL MEDICINE
Payer: COMMERCIAL

## 2025-08-28 DIAGNOSIS — K21.9 GERD WITHOUT ESOPHAGITIS: ICD-10-CM

## 2025-08-28 DIAGNOSIS — R73.01 IMPAIRED FASTING GLUCOSE: ICD-10-CM

## 2025-08-28 DIAGNOSIS — Z85.3 HISTORY OF BREAST CANCER: ICD-10-CM

## 2025-08-28 DIAGNOSIS — E78.2 HYPERLIPIDEMIA, MIXED: ICD-10-CM

## 2025-08-28 LAB
ABSOLUTE EOSINOPHIL (OHS): 0.08 K/UL
ABSOLUTE MONOCYTE (OHS): 0.46 K/UL (ref 0.3–1)
ABSOLUTE NEUTROPHIL COUNT (OHS): 3.43 K/UL (ref 1.8–7.7)
ALBUMIN SERPL BCP-MCNC: 4.3 G/DL (ref 3.5–5.2)
ALP SERPL-CCNC: 67 UNIT/L (ref 40–150)
ALT SERPL W/O P-5'-P-CCNC: 21 UNIT/L (ref 0–55)
ANION GAP (OHS): 7 MMOL/L (ref 8–16)
AST SERPL-CCNC: 25 UNIT/L (ref 0–50)
BASOPHILS # BLD AUTO: 0.06 K/UL
BASOPHILS NFR BLD AUTO: 1 %
BILIRUB SERPL-MCNC: 0.3 MG/DL (ref 0.1–1)
BUN SERPL-MCNC: 13 MG/DL (ref 8–23)
CALCIUM SERPL-MCNC: 9.4 MG/DL (ref 8.7–10.5)
CHLORIDE SERPL-SCNC: 103 MMOL/L (ref 95–110)
CHOLEST SERPL-MCNC: 185 MG/DL (ref 120–199)
CHOLEST/HDLC SERPL: 3.2 {RATIO} (ref 2–5)
CO2 SERPL-SCNC: 27 MMOL/L (ref 23–29)
CREAT SERPL-MCNC: 0.6 MG/DL (ref 0.5–1.4)
EAG (OHS): 114 MG/DL (ref 68–131)
ERYTHROCYTE [DISTWIDTH] IN BLOOD BY AUTOMATED COUNT: 13.9 % (ref 11.5–14.5)
GFR SERPLBLD CREATININE-BSD FMLA CKD-EPI: >60 ML/MIN/1.73/M2
GLUCOSE SERPL-MCNC: 102 MG/DL (ref 70–110)
HBA1C MFR BLD: 5.6 % (ref 4–5.6)
HCT VFR BLD AUTO: 38.6 % (ref 37–48.5)
HDLC SERPL-MCNC: 57 MG/DL (ref 40–75)
HDLC SERPL: 30.8 % (ref 20–50)
HGB BLD-MCNC: 12.7 GM/DL (ref 12–16)
IMM GRANULOCYTES # BLD AUTO: 0.02 K/UL (ref 0–0.04)
IMM GRANULOCYTES NFR BLD AUTO: 0.3 % (ref 0–0.5)
LDLC SERPL CALC-MCNC: 111.8 MG/DL (ref 63–159)
LYMPHOCYTES # BLD AUTO: 2.24 K/UL (ref 1–4.8)
MCH RBC QN AUTO: 29.6 PG (ref 27–31)
MCHC RBC AUTO-ENTMCNC: 32.9 G/DL (ref 32–36)
MCV RBC AUTO: 90 FL (ref 82–98)
NONHDLC SERPL-MCNC: 128 MG/DL
NUCLEATED RBC (/100WBC) (OHS): 0 /100 WBC
PLATELET # BLD AUTO: 344 K/UL (ref 150–450)
PMV BLD AUTO: 9.7 FL (ref 9.2–12.9)
POTASSIUM SERPL-SCNC: 4.1 MMOL/L (ref 3.5–5.1)
PROT SERPL-MCNC: 7.2 GM/DL (ref 6–8.4)
RBC # BLD AUTO: 4.29 M/UL (ref 4–5.4)
RELATIVE EOSINOPHIL (OHS): 1.3 %
RELATIVE LYMPHOCYTE (OHS): 35.6 % (ref 18–48)
RELATIVE MONOCYTE (OHS): 7.3 % (ref 4–15)
RELATIVE NEUTROPHIL (OHS): 54.5 % (ref 38–73)
SODIUM SERPL-SCNC: 137 MMOL/L (ref 136–145)
TRIGL SERPL-MCNC: 81 MG/DL (ref 30–150)
WBC # BLD AUTO: 6.29 K/UL (ref 3.9–12.7)

## 2025-08-28 PROCEDURE — 36415 COLL VENOUS BLD VENIPUNCTURE: CPT

## 2025-08-28 PROCEDURE — 85025 COMPLETE CBC W/AUTO DIFF WBC: CPT

## 2025-08-28 PROCEDURE — 82465 ASSAY BLD/SERUM CHOLESTEROL: CPT

## 2025-08-28 PROCEDURE — 83036 HEMOGLOBIN GLYCOSYLATED A1C: CPT

## 2025-08-28 PROCEDURE — 82040 ASSAY OF SERUM ALBUMIN: CPT

## (undated) DEVICE — GOWN ECLIPSE REINF LVL4 TWL XL

## (undated) DEVICE — SYR ONLY LUER LOCK 20CC

## (undated) DEVICE — BNDG COFLEX FOAM LF2 ST 3X5YD

## (undated) DEVICE — SUT PROLENE 3-0 FS-2 18

## (undated) DEVICE — TOWEL OR XRAY BLUE 17X26IN

## (undated) DEVICE — BLADE SURG #15 CARBON STEEL

## (undated) DEVICE — GLOVE BIOGEL PI MICRO SZ 7

## (undated) DEVICE — DRESSING N ADH OIL EMUL 3X3

## (undated) DEVICE — SUT VICRYL 4-0 RB1 27IN UD

## (undated) DEVICE — SEE MEDLINE ITEM 159592

## (undated) DEVICE — KNIFE CARPAL TUNNEL

## (undated) DEVICE — Device

## (undated) DEVICE — PAD CAST 2 IN X 4YDS STERILE

## (undated) DEVICE — TOURNIQUET SB QC DP 18X4IN

## (undated) DEVICE — TOWEL OR DISP STRL BLUE 4/PK

## (undated) DEVICE — COVER CAMERA OPERATING ROOM

## (undated) DEVICE — SPONGE COTTON TRAY 4X4IN

## (undated) DEVICE — SOL NACL IRR 1000ML BTL

## (undated) DEVICE — GLOVE SENSICARE PI SURG 7.5

## (undated) DEVICE — NDL HYPO STD REG BVL 18GX1.5IN